# Patient Record
Sex: FEMALE | Race: OTHER | NOT HISPANIC OR LATINO | Employment: FULL TIME | URBAN - NONMETROPOLITAN AREA
[De-identification: names, ages, dates, MRNs, and addresses within clinical notes are randomized per-mention and may not be internally consistent; named-entity substitution may affect disease eponyms.]

---

## 2017-01-04 ENCOUNTER — TELEPHONE (OUTPATIENT)
Dept: MEDICAL GROUP | Facility: PHYSICIAN GROUP | Age: 32
End: 2017-01-04

## 2017-01-04 RX ORDER — ESCITALOPRAM OXALATE 10 MG/1
TABLET ORAL
Qty: 30 TAB | Refills: 3 | Status: SHIPPED | OUTPATIENT
Start: 2017-01-04 | End: 2017-07-26 | Stop reason: SDUPTHER

## 2017-01-04 NOTE — TELEPHONE ENCOUNTER
1. Caller Name:Catherine                                Call Back Number: [phone] (home)       Patient approves a detailed voicemail message: N\A    Received message asking for refill Lexapro. RX approved and received by pharmacyCatherine

## 2017-01-10 ENCOUNTER — OFFICE VISIT (OUTPATIENT)
Dept: URGENT CARE | Facility: PHYSICIAN GROUP | Age: 32
End: 2017-01-10
Payer: COMMERCIAL

## 2017-01-10 VITALS
BODY MASS INDEX: 38.59 KG/M2 | TEMPERATURE: 97.3 F | SYSTOLIC BLOOD PRESSURE: 122 MMHG | DIASTOLIC BLOOD PRESSURE: 84 MMHG | HEART RATE: 108 BPM | RESPIRATION RATE: 14 BRPM | WEIGHT: 239 LBS | OXYGEN SATURATION: 97 %

## 2017-01-10 DIAGNOSIS — R30.0 DYSURIA: ICD-10-CM

## 2017-01-10 LAB
APPEARANCE UR: NORMAL
BILIRUB UR STRIP-MCNC: NORMAL MG/DL
COLOR UR AUTO: NORMAL
GLUCOSE UR STRIP.AUTO-MCNC: NORMAL MG/DL
KETONES UR STRIP.AUTO-MCNC: NORMAL MG/DL
LEUKOCYTE ESTERASE UR QL STRIP.AUTO: NORMAL
NITRITE UR QL STRIP.AUTO: POSITIVE
PH UR STRIP.AUTO: 6.5 [PH] (ref 5–8)
PROT UR QL STRIP: 30 MG/DL
RBC UR QL AUTO: NORMAL
SP GR UR STRIP.AUTO: 1.01
UROBILINOGEN UR STRIP-MCNC: NORMAL MG/DL

## 2017-01-10 PROCEDURE — 99213 OFFICE O/P EST LOW 20 MIN: CPT | Performed by: FAMILY MEDICINE

## 2017-01-10 PROCEDURE — 81002 URINALYSIS NONAUTO W/O SCOPE: CPT | Performed by: FAMILY MEDICINE

## 2017-01-10 RX ORDER — PHENAZOPYRIDINE HYDROCHLORIDE 200 MG/1
200 TABLET, FILM COATED ORAL 3 TIMES DAILY PRN
Qty: 6 TAB | Refills: 0 | Status: SHIPPED | OUTPATIENT
Start: 2017-01-10 | End: 2018-08-21

## 2017-01-10 RX ORDER — SULFAMETHOXAZOLE AND TRIMETHOPRIM 800; 160 MG/1; MG/1
1 TABLET ORAL EVERY 12 HOURS
Qty: 10 TAB | Refills: 0 | Status: SHIPPED | OUTPATIENT
Start: 2017-01-10 | End: 2017-01-15

## 2017-01-10 ASSESSMENT — ENCOUNTER SYMPTOMS
DIZZINESS: 0
FOCAL WEAKNESS: 0
FEVER: 0
CHILLS: 0

## 2017-01-11 NOTE — PROGRESS NOTES
Subjective:      Catherine Porter is a 31 y.o. female who presents with UTI    Chief Complaint   Patient presents with   • UTI        - freq/burn x 1 day. No NVFC              UTI  Pertinent negatives include no chills or fever.       Review of Systems   Constitutional: Negative for fever and chills.   Genitourinary: Positive for dysuria and frequency.   Neurological: Negative for dizziness and focal weakness.          Objective:     /84 mmHg  Pulse 108  Temp(Src) 36.3 °C (97.3 °F)  Resp 14  Wt 108.41 kg (239 lb)  SpO2 97%     Physical Exam   Constitutional: She appears well-developed. No distress.   HENT:   Head: Normocephalic and atraumatic.   Cardiovascular: Regular rhythm.    No murmur heard.  Abdominal: Soft. There is no tenderness.   Neurological: She is alert.   Skin: Skin is warm and dry.   Psychiatric: She has a normal mood and affect. Judgment normal.   Nursing note and vitals reviewed.              Assessment/Plan:         1. Dysuria  POCT Urinalysis    sulfamethoxazole-trimethoprim (BACTRIM DS) 800-160 MG tablet    phenazopyridine (PYRIDIUM) 200 MG Tab             Dx & d/c instructions discussed w/ patient and/or family members. Follow up w/ Prvt Dr or here in 3-4 days if not getting better, sooner if needed,  ER if worse and UC/PCP unavailable.        Possible side effects (i.e. Rash, GI upset/constipation, sedation, elevation of BP or sugars) of any medications given discussed.

## 2017-03-16 ENCOUNTER — OFFICE VISIT (OUTPATIENT)
Dept: MEDICAL GROUP | Facility: PHYSICIAN GROUP | Age: 32
End: 2017-03-16
Payer: COMMERCIAL

## 2017-03-16 VITALS
RESPIRATION RATE: 16 BRPM | TEMPERATURE: 100.8 F | HEIGHT: 66 IN | OXYGEN SATURATION: 97 % | WEIGHT: 244 LBS | BODY MASS INDEX: 39.21 KG/M2 | HEART RATE: 88 BPM | DIASTOLIC BLOOD PRESSURE: 80 MMHG | SYSTOLIC BLOOD PRESSURE: 108 MMHG

## 2017-03-16 DIAGNOSIS — F41.9 ANXIETY: ICD-10-CM

## 2017-03-16 DIAGNOSIS — G47.30 SLEEP DISORDER BREATHING: ICD-10-CM

## 2017-03-16 PROCEDURE — 99213 OFFICE O/P EST LOW 20 MIN: CPT | Performed by: NURSE PRACTITIONER

## 2017-03-16 ASSESSMENT — PAIN SCALES - GENERAL: PAINLEVEL: NO PAIN

## 2017-03-16 NOTE — ASSESSMENT & PLAN NOTE
This is a 32 y/o obese female who is here to discuss sleep disordered breathing. She tells me that her  complains of her snoring nightly. She wakes in the morning not feeling rested. She does feel quite fatigued throughout the day. She feels that she could take a nap at any point during the day. Her  is describe apneic episodes. She has tried sleeping propped up on pillows which does not help her snoring. We discussed referral to sleep studies for possible sleep apnea evaluation. She is agreeable.

## 2017-03-16 NOTE — ASSESSMENT & PLAN NOTE
Ongoing, well-controlled on present medications. Patient continues to use Lexapro 10 mg daily. She does not need to use her Atarax for panic attack. She denies ruminating thoughts, worry. She feels quite happy. She continues to see a marriage counselor with her . She denies any adverse effects this medication. She has been trying to spend more time doing things for herself. She schedules time with friends monthly, she does crafts with her children during the week.

## 2017-03-16 NOTE — PROGRESS NOTES
[facility]  Primary Care Office Visit - Problem-Oriented        History:     Catherine Porter is a 31 y.o. female who is here today to discuss Follow-Up      Sleep disorder breathing  This is a 30 y/o obese female who is here to discuss sleep disordered breathing. She tells me that her  complains of her snoring nightly. She wakes in the morning not feeling rested. She does feel quite fatigued throughout the day. She feels that she could take a nap at any point during the day. Her  is describe apneic episodes. She has tried sleeping propped up on pillows which does not help her snoring. We discussed referral to sleep studies for possible sleep apnea evaluation. She is agreeable.    Anxiety  Ongoing, well-controlled on present medications. Patient continues to use Lexapro 10 mg daily. She does not need to use her Atarax for panic attack. She denies ruminating thoughts, worry. She feels quite happy. She continues to see a marriage counselor with her . She denies any adverse effects this medication. She has been trying to spend more time doing things for herself. She schedules time with friends monthly, she does crafts with her children during the week.          Past Medical History   Diagnosis Date   • Hyperlipidemia LDL goal < 130 7/30/2015     Past Surgical History   Procedure Laterality Date   • Gyn surgery  primary c section     2010   • Repeat c section  12/23/2013     Performed by Dony Bales M.D. at [facility]     Social History     Social History   • Marital Status:      Spouse Name: N/A   • Number of Children: N/A   • Years of Education: N/A     Occupational History   • Not on file.     Social History Main Topics   • Smoking status: Never Smoker    • Smokeless tobacco: Never Used   • Alcohol Use: 1.0 oz/week     2 Glasses of wine per week      Comment: once weekly, 1-2 glasses of wine   • Drug Use: No   • Sexual Activity: Not on file     Other Topics Concern   •  "Not on file     Social History Narrative     History   Smoking status   • Never Smoker    Smokeless tobacco   • Never Used     Family History   Problem Relation Age of Onset   • Hypertension Mother    • Arthritis Mother    • Diabetes Father    • Heart Disease Father    • Heart Attack Father    • Cancer Maternal Grandmother      breast     No Known Allergies    Problem List:     Patient Active Problem List    Diagnosis Date Noted   • Sleep disorder breathing 03/16/2017   • PATRICIA positive for HSV 09/16/2016   • Vitamin D deficiency 08/27/2015   • Tension headache 08/27/2015   • Nausea 08/27/2015   • Hyperlipidemia with target LDL less than 130 07/30/2015   • Anxiety 07/16/2015   • Family history of diabetes mellitus 06/30/2015   • Obesity 06/30/2015         Medications:     Current outpatient prescriptions:   •  escitalopram (LEXAPRO) 10 MG Tab, TAKE ONE TABLET BY MOUTH ONCE DAILY, Disp: 30 Tab, Rfl: 3  •  fluticasone (FLONASE) 50 MCG/ACT nasal spray, Spray 1 Spray in nose 2 times a day., Disp: 1 Bottle, Rfl: 0  •  phenazopyridine (PYRIDIUM) 200 MG Tab, Take 1 Tab by mouth 3 times a day as needed., Disp: 6 Tab, Rfl: 0  •  erythromycin 5 MG/GM Ointment, Place 1 cm in left eye 2 times a day., Disp: 1 Tube, Rfl: 0  •  acetaminophen/caffeine/butalbital 325-40-50 mg (FIORICET) -40 MG Tab, Take 1 Tab by mouth every four hours as needed for Headache., Disp: 30 Tab, Rfl: 0  •  hydrOXYzine (ATARAX) 25 MG TABS, Take 1 Tab by mouth 3 times a day as needed for Anxiety., Disp: 30 Tab, Rfl: 3      Review of Systems:     (positives in bold), all other systems reviewed and WNL   CONST:  fatigue, weight change, appetite change, fevers, chills  Psych: Suicidal ideation, homicidal ideation    Physical Assessment:     VS: /80 mmHg  Pulse 88  Temp(Src) 38.2 °C (100.8 °F)  Resp 16  Ht 1.676 m (5' 6\")  Wt 110.678 kg (244 lb)  BMI 39.40 kg/m2  SpO2 97%  LMP 03/01/2017  Breastfeeding? No    General: Well-developed, " well-nourished, female    Head: PERRL, EOMI. Normocephalic. No facial asymmetry noted.  Neck: Short, thick neck. Neck supple, no thyromegaly  Cardiovasc:No JVD.  RRR, no MRG. No thrills or bruits. Pulses 2+ and symmetric at all distal extremities.  Pulmonary: Lungs clear bilaterally.  Normal respiratory effort. No wheeze or crackles.   Extremities: No edema, no TTP bilateral calves. Pedal pulses intact. No joint effusions. LEs warm and well-perfused.  Neuro: Alert and oriented  Skin:No rashes noted. Skin warm, dry, intact    Psych: Dressed appropriately for the weather, pleasant and conversant.  Affect, mood & judgment appropriate.    Assessment/Plan:   Catherine was seen today for follow-up.    Diagnoses and all orders for this visit:    Anxiety, well controlled on present medication   - Continue Lexapro 10 mg daily. Continue to see counselor. Follow up yearly, sooner if needed.    Sleep disorder breathing, new, uncontrolled   -     REFERRAL TO SLEEP STUDIES    Patient is agreeable to the above plan and voiced understanding. All questions answered.     Please note that this dictation was created using voice recognition software. I have made every reasonable attempt to correct obvious errors, but I expect that there are errors of grammar and possibly content that I did not discover before finalizing the note.      RAMONA Denny  3/16/2017, 9:46 AM

## 2017-03-16 NOTE — MR AVS SNAPSHOT
"        Catherine Riveraper   3/16/2017 9:00 AM   Office Visit   MRN: [id number]    Department:  [facility]   Dept Phone:  [phone]    Description:  Female : 1985   Provider:  GAGE Cordero           Reason for Visit     Follow-Up anxiety and medications      Allergies as of 3/16/2017     No Known Allergies      You were diagnosed with     Anxiety   [825613]       Sleep disorder breathing   [928513]         Vital Signs     Blood Pressure Pulse Temperature Respirations Height Weight    108/80 mmHg 88 38.2 °C (100.8 °F) 16 1.676 m (5' 6\") 110.678 kg (244 lb)    Body Mass Index Oxygen Saturation Last Menstrual Period Breastfeeding? Smoking Status       39.40 kg/m2 97% 2017 No Never Smoker        Basic Information     Date Of Birth Sex Race Ethnicity Preferred Language    1985 Female Other Non- English      Your appointments     Mar 22, 2018  9:00 AM   Established Patient with GAGE Cordero   [address] [address] [phone]           You will be receiving a confirmation call a few days before your appointment from our automated call confirmation system.              Problem List              ICD-10-CM Priority Class Noted - Resolved    Family history of diabetes mellitus Z83.3   2015 - Present    Obesity E66.9   2015 - Present    Anxiety F41.9   2015 - Present    Hyperlipidemia with target LDL less than 130 E78.5   2015 - Present    Vitamin D deficiency E55.9   2015 - Present    Tension headache G44.209   2015 - Present    Nausea R11.0   2015 - Present    PATRICIA positive for HSV Z11.59, B00.9   2016 - Present    Sleep disorder breathing G47.30   3/16/2017 - Present      Health Maintenance        Date Due Completion Dates    IMM DTaP/Tdap/Td Vaccine (1 - Tdap) 2002 ---    PAP SMEAR 2004 ---    IMM INFLUENZA (1) 2016 ---            Current " Immunizations     MMR Vaccine 12/24/2013  6:30 AM    MMR/Varicella Combined Vaccine  Incomplete    Tdap Vaccine  Incomplete, 11/26/2013      Below and/or attached are the medications your provider expects you to take. Review all of your home medications and newly ordered medications with your provider and/or pharmacist. Follow medication instructions as directed by your provider and/or pharmacist. Please keep your medication list with you and share with your provider. Update the information when medications are discontinued, doses are changed, or new medications (including over-the-counter products) are added; and carry medication information at all times in the event of emergency situations     Allergies:  No Known Allergies          Medications  Valid as of: March 16, 2017 -  9:45 AM    Generic Name Brand Name Tablet Size Instructions for use    Butalbital-APAP-Caffeine (Tab) FIORICET -40 MG Take 1 Tab by mouth every four hours as needed for Headache.        Erythromycin (Ointment) erythromycin 5 MG/GM Place 1 cm in left eye 2 times a day.        Escitalopram Oxalate (Tab) LEXAPRO 10 MG TAKE ONE TABLET BY MOUTH ONCE DAILY        Fluticasone Propionate (Suspension) FLONASE 50 MCG/ACT Spray 1 Spray in nose 2 times a day.        HydrOXYzine HCl (Tab) ATARAX 25 MG Take 1 Tab by mouth 3 times a day as needed for Anxiety.        Phenazopyridine HCl (Tab) PYRIDIUM 200 MG Take 1 Tab by mouth 3 times a day as needed.        .                 Medicines prescribed today were sent to:     [facility] [address] [place] [address] - [facility] [address]    Phone: [phone] Fax: [phone]    Open 24 Hours?: No      Medication refill instructions:       If your prescription bottle indicates you have medication refills left, it is not necessary to call your provider’s office. Please contact your pharmacy and they will refill your medication.    If your prescription bottle  indicates you do not have any refills left, you may request refills at any time through one of the following ways: The online [employer] system (except Urgent Care), by calling your provider’s office, or by asking your pharmacy to contact your provider’s office with a refill request. Medication refills are processed only during regular business hours and may not be available until the next business day. Your provider may request additional information or to have a follow-up visit with you prior to refilling your medication.   *Please Note: Medication refills are assigned a new Rx number when refilled electronically. Your pharmacy may indicate that no refills were authorized even though a new prescription for the same medication is available at the pharmacy. Please request the medicine by name with the pharmacy before contacting your provider for a refill.        Referral     A referral request has been sent to our patient care coordination department. Please allow 3-5 business days for us to process this request and contact you either by phone or mail. If you do not hear from us by the 5th business day, please call us at [phone].           [employer] Access Code: [email or web address]  Expires: 3/30/2017  1:07 PM    [employer]  A secure, online tool to manage your health information     [employer]’s [employer]® is a secure, online tool that connects you to your personalized health information from the privacy of your home -- day or night - making it very easy for you to manage your healthcare. Once the activation process is completed, you can even access your medical information using the [employer] lenin, which is available for free in the [employer] store or Google Play store.     [employer] provides the following levels of access (as shown below):   My Chart Features   [facility] Doctor [facility]  Specialists [facility]  Urgent  Care Non-[facility]  Primary Care  Doctor   Email your healthcare team securely and privately 24/7 X X  X    Manage appointments: schedule your next appointment; view details of past/upcoming appointments X      Request prescription refills. X      View recent personal medical records, including lab and immunizations X X X X   View health record, including health history, allergies, medications X X X X   Read reports about your outpatient visits, procedures, consult and ER notes X X X X   See your discharge summary, which is a recap of your hospital and/or ER visit that includes your diagnosis, lab results, and care plan. X X       How to register for [employer] (PRIMP):  1. Go to  [email or web address].  2. Click on the Sign Up Now box, which takes you to the New Member Sign Up page. You will need to provide the following information:  a. Enter your [employer] ([employer]) Access Code exactly as it appears at the top of this page. (You will not need to use this code after you’ve completed the sign-up process. If you do not sign up before the expiration date, you must request a new code.)   b. Enter your date of birth.   c. Enter your home email address.   d. Click Submit, and follow the next screen’s instructions.  3. Create a [employer] ([employer]) ID. This will be your [employer]) login ID and cannot be changed, so think of one that is secure and easy to remember.  4. Create a [employer] ([employer]) password. You can change your password at any time.  5. Enter your Password Reset Question and Answer. This can be used at a later time if you forget your password.   6. Enter your e-mail address. This allows you to receive e-mail notifications when new information is available in [employer] (PRIMP).  7. Click Sign Up. You can now view your health information.    For assistance activating your [employer]) account, call [phone]

## 2017-07-26 RX ORDER — ESCITALOPRAM OXALATE 10 MG/1
TABLET ORAL
Qty: 90 TAB | Refills: 1 | Status: SHIPPED | OUTPATIENT
Start: 2017-07-26 | End: 2018-08-21

## 2017-08-28 ENCOUNTER — OFFICE VISIT (OUTPATIENT)
Dept: URGENT CARE | Facility: PHYSICIAN GROUP | Age: 32
End: 2017-08-28
Payer: COMMERCIAL

## 2017-08-28 VITALS
BODY MASS INDEX: 35.36 KG/M2 | HEART RATE: 80 BPM | SYSTOLIC BLOOD PRESSURE: 104 MMHG | RESPIRATION RATE: 16 BRPM | DIASTOLIC BLOOD PRESSURE: 68 MMHG | HEIGHT: 66 IN | TEMPERATURE: 98.4 F | OXYGEN SATURATION: 96 % | WEIGHT: 220 LBS

## 2017-08-28 DIAGNOSIS — H60.501 ACUTE OTITIS EXTERNA OF RIGHT EAR, UNSPECIFIED TYPE: ICD-10-CM

## 2017-08-28 PROCEDURE — 99214 OFFICE O/P EST MOD 30 MIN: CPT | Performed by: FAMILY MEDICINE

## 2017-08-28 RX ORDER — SULFAMETHOXAZOLE AND TRIMETHOPRIM 800; 160 MG/1; MG/1
1 TABLET ORAL 2 TIMES DAILY
Qty: 20 TAB | Refills: 0 | Status: SHIPPED | OUTPATIENT
Start: 2017-08-28 | End: 2017-09-07

## 2017-08-28 RX ORDER — IBUPROFEN 800 MG/1
800 TABLET ORAL EVERY 8 HOURS PRN
Qty: 30 TAB | Refills: 0 | Status: SHIPPED | OUTPATIENT
Start: 2017-08-28 | End: 2018-04-18

## 2017-08-28 RX ORDER — ESCITALOPRAM OXALATE 10 MG/1
10 TABLET ORAL DAILY
COMMUNITY
End: 2018-08-21 | Stop reason: SDUPTHER

## 2017-08-28 RX ORDER — CIPROFLOXACIN AND DEXAMETHASONE 3; 1 MG/ML; MG/ML
4 SUSPENSION/ DROPS AURICULAR (OTIC) 2 TIMES DAILY
Qty: 1 BOTTLE | Refills: 0 | Status: SHIPPED | OUTPATIENT
Start: 2017-08-28 | End: 2018-04-18

## 2017-08-31 ASSESSMENT — ENCOUNTER SYMPTOMS
HEADACHES: 0
SORE THROAT: 0

## 2017-09-01 NOTE — PROGRESS NOTES
"Subjective:   Catherine Carvajal a 31 y.o. female who presents for Otalgia (2 days )    Otalgia    There is pain in the right ear. This is a new problem. The current episode started yesterday. The problem occurs constantly. The problem has been gradually worsening. There has been no fever. Pertinent negatives include no ear discharge, headaches, hearing loss or sore throat.     Review of Systems   HENT: Positive for ear pain. Negative for ear discharge, hearing loss and sore throat.    Neurological: Negative for headaches.     No Known Allergies   Objective:   /68   Pulse 80   Temp 36.9 °C (98.4 °F)   Resp 16   Ht 1.676 m (5' 6\")   Wt 99.8 kg (220 lb)   SpO2 96%   BMI 35.51 kg/m²   Physical Exam   Constitutional: She is oriented to person, place, and time. She appears well-developed and well-nourished. No distress.   HENT:   Head: Normocephalic and atraumatic.   Right Ear: There is swelling and tenderness. No drainage. No middle ear effusion. No decreased hearing is noted.   Eyes: Conjunctivae and EOM are normal. Pupils are equal, round, and reactive to light.   Cardiovascular: Normal rate and regular rhythm.    No murmur heard.  Pulmonary/Chest: Effort normal and breath sounds normal. No respiratory distress.   Abdominal: Soft. She exhibits no distension. There is no tenderness.   Neurological: She is alert and oriented to person, place, and time. She has normal reflexes. No sensory deficit.   Skin: Skin is warm and dry.   Psychiatric: She has a normal mood and affect.     Assessment/Plan:   Assessment    1. Acute otitis externa of right ear, unspecified type  Differential diagnosis, natural history, supportive care, and indications for immediate follow-up discussed.         "

## 2017-10-08 ENCOUNTER — OFFICE VISIT (OUTPATIENT)
Dept: URGENT CARE | Facility: CLINIC | Age: 32
End: 2017-10-08
Payer: COMMERCIAL

## 2017-10-08 VITALS
TEMPERATURE: 97.5 F | WEIGHT: 235 LBS | RESPIRATION RATE: 14 BRPM | OXYGEN SATURATION: 95 % | SYSTOLIC BLOOD PRESSURE: 108 MMHG | BODY MASS INDEX: 37.77 KG/M2 | DIASTOLIC BLOOD PRESSURE: 64 MMHG | HEART RATE: 88 BPM | HEIGHT: 66 IN

## 2017-10-08 DIAGNOSIS — J02.9 SORE THROAT: ICD-10-CM

## 2017-10-08 DIAGNOSIS — J02.9 VIRAL PHARYNGITIS: ICD-10-CM

## 2017-10-08 LAB
INT CON NEG: NEGATIVE
INT CON POS: POSITIVE
S PYO AG THROAT QL: NEGATIVE

## 2017-10-08 PROCEDURE — 99214 OFFICE O/P EST MOD 30 MIN: CPT | Performed by: NURSE PRACTITIONER

## 2017-10-08 PROCEDURE — 87880 STREP A ASSAY W/OPTIC: CPT | Performed by: NURSE PRACTITIONER

## 2017-10-08 RX ORDER — AZITHROMYCIN 250 MG/1
TABLET, FILM COATED ORAL
Qty: 6 TAB | Refills: 0 | Status: SHIPPED | OUTPATIENT
Start: 2017-10-08 | End: 2018-08-21

## 2017-10-08 ASSESSMENT — ENCOUNTER SYMPTOMS
FEVER: 0
HOARSE VOICE: 1
TROUBLE SWALLOWING: 1
SORE THROAT: 1
SWOLLEN GLANDS: 1

## 2017-10-08 NOTE — PROGRESS NOTES
"Subjective:      Catherine Porter is a 31 y.o. female who presents with Pharyngitis (x 1 week and getting worse x 2 days)            Pharyngitis    This is a new problem. Episode onset: pt reports sore throat began one week ago. Over the last 2 days it has gotten worse. She has tried hot tea with honey and lemon, Tyelnol and Ibuprofen w/o relief. The problem has been gradually worsening. Neither side of throat is experiencing more pain than the other. There has been no fever. The pain is moderate. Associated symptoms include a hoarse voice, swollen glands and trouble swallowing. The treatment provided no relief.       Review of Systems   Constitutional: Positive for malaise/fatigue. Negative for fever.   HENT: Positive for hoarse voice, sore throat and trouble swallowing.    All other systems reviewed and are negative.    Past Medical History:   Diagnosis Date   • Hyperlipidemia LDL goal < 130 7/30/2015      Past Surgical History:   Procedure Laterality Date   • REPEAT C SECTION  12/23/2013    Performed by Dony Bales M.D. at [facility]   • GYN SURGERY  primary c section    2010      Social History     Social History   • Marital status:      Spouse name: N/A   • Number of children: N/A   • Years of education: N/A     Occupational History   • Not on file.     Social History Main Topics   • Smoking status: Never Smoker   • Smokeless tobacco: Never Used   • Alcohol use 1.0 oz/week     2 Glasses of wine per week      Comment: once weekly, 1-2 glasses of wine   • Drug use: No   • Sexual activity: Not on file     Other Topics Concern   • Not on file     Social History Narrative   • No narrative on file          Objective:     /64   Pulse 88   Temp 36.4 °C (97.5 °F)   Resp 14   Ht 1.676 m (5' 6\")   Wt 106.6 kg (235 lb)   SpO2 95%   BMI 37.93 kg/m²      Physical Exam   Constitutional: She is oriented to person, place, and time. Vital signs are normal. She appears well-developed and " well-nourished.   HENT:   Head: Normocephalic and atraumatic.   Right Ear: Tympanic membrane and external ear normal.   Left Ear: Tympanic membrane and external ear normal.   Nose: Nose normal.   Mouth/Throat: Posterior oropharyngeal edema and posterior oropharyngeal erythema present. Tonsils are 2+ on the right. Tonsils are 2+ on the left. No tonsillar exudate.   Eyes: EOM are normal. Pupils are equal, round, and reactive to light.   Neck: Normal range of motion.   Cardiovascular: Normal rate and regular rhythm.    Pulmonary/Chest: Effort normal.   Musculoskeletal: Normal range of motion.   Lymphadenopathy:        Head (right side): Submandibular and tonsillar adenopathy present.        Head (left side): Submandibular and tonsillar adenopathy present.   Neurological: She is alert and oriented to person, place, and time.   Skin: Skin is warm and dry. Capillary refill takes less than 2 seconds.   Psychiatric: She has a normal mood and affect. Her speech is normal and behavior is normal. Thought content normal.   Vitals reviewed.              Assessment/Plan:     1. Sore throat  - POCT Rapid Strep A NEGATIVE    2. Viral pharyngitis  - azithromycin (ZITHROMAX) 250 MG Tab; Take 2 tabs PO on the first day, then one tab PO daily for 4 days  Dispense: 6 Tab; Refill: 0    Contingent antibiotic prescription given to patient to fill upon meeting criteria of guidelines discussed.   Warm salt water gargles  Supportive care, differential diagnoses, and indications for immediate follow-up discussed with patient.    Pathogenesis of diagnosis discussed including typical length and natural progression.      Instructed to return to  or nearest emergency department if symptoms fail to improve, for any change in condition, further concerns, or new concerning symptoms.  Patient states understanding of the plan of care and discharge instructions.

## 2018-04-18 ENCOUNTER — OFFICE VISIT (OUTPATIENT)
Dept: URGENT CARE | Facility: CLINIC | Age: 33
End: 2018-04-18
Payer: COMMERCIAL

## 2018-04-18 VITALS
DIASTOLIC BLOOD PRESSURE: 80 MMHG | BODY MASS INDEX: 33.74 KG/M2 | HEART RATE: 82 BPM | SYSTOLIC BLOOD PRESSURE: 118 MMHG | OXYGEN SATURATION: 97 % | HEIGHT: 67 IN | WEIGHT: 215 LBS | TEMPERATURE: 97.6 F | RESPIRATION RATE: 16 BRPM

## 2018-04-18 DIAGNOSIS — H60.332 ACUTE SWIMMER'S EAR OF LEFT SIDE: ICD-10-CM

## 2018-04-18 PROCEDURE — 99214 OFFICE O/P EST MOD 30 MIN: CPT | Performed by: FAMILY MEDICINE

## 2018-04-18 RX ORDER — CEPHALEXIN 500 MG/1
500 CAPSULE ORAL 3 TIMES DAILY
Qty: 21 CAP | Refills: 0 | Status: SHIPPED | OUTPATIENT
Start: 2018-04-18 | End: 2018-04-25

## 2018-04-18 RX ORDER — NEOMYCIN POLYMYXIN B SULFATES AND DEXAMETHASONE 3.5; 10000; 1 MG/ML; [USP'U]/ML; MG/ML
SUSPENSION/ DROPS OPHTHALMIC
Qty: 1 BOTTLE | Refills: 1 | Status: SHIPPED | OUTPATIENT
Start: 2018-04-18 | End: 2018-08-21

## 2018-04-18 RX ORDER — IBUPROFEN 800 MG/1
800 TABLET ORAL EVERY 8 HOURS PRN
Qty: 20 TAB | Refills: 3 | Status: SHIPPED | OUTPATIENT
Start: 2018-04-18 | End: 2018-08-21

## 2018-04-18 ASSESSMENT — ENCOUNTER SYMPTOMS
DIZZINESS: 0
ORTHOPNEA: 0
FEVER: 0
SHORTNESS OF BREATH: 0
HEMOPTYSIS: 0
CHILLS: 0
FOCAL WEAKNESS: 0

## 2018-04-18 NOTE — PROGRESS NOTES
Subjective:      Catherine Porter is a 32 y.o. female who presents with Otalgia ((L) ear x 1 week)    Chief Complaint   Patient presents with   • Otalgia     (L) ear x 1 week        - This is a very pleasant 32 y.o. female with complaints of Lt ear pain x 1wk, no trauma          ALLERGIES:  Patient has no known allergies.     PMH:  Past Medical History:   Diagnosis Date   • Hyperlipidemia LDL goal < 130 7/30/2015        MEDS:    Current Outpatient Prescriptions:   •  neomycin-polymyxin-dexamethasone (MAXITROL) 0.1 % ophthalmic suspension, 5 drops Lt ear TID x 1 week, Disp: 1 Bottle, Rfl: 1  •  ibuprofen (MOTRIN) 800 MG Tab, Take 1 Tab by mouth every 8 hours as needed., Disp: 20 Tab, Rfl: 3  •  cephALEXin (KEFLEX) 500 MG Cap, Take 1 Cap by mouth 3 times a day for 7 days., Disp: 21 Cap, Rfl: 0  •  escitalopram (LEXAPRO) 10 MG Tab, TAKE ONE TABLET BY MOUTH ONCE DAILY, Disp: 90 Tab, Rfl: 1  •  azithromycin (ZITHROMAX) 250 MG Tab, Take 2 tabs PO on the first day, then one tab PO daily for 4 days, Disp: 6 Tab, Rfl: 0  •  escitalopram (LEXAPRO) 10 MG Tab, Take 10 mg by mouth every day., Disp: , Rfl:   •  phenazopyridine (PYRIDIUM) 200 MG Tab, Take 1 Tab by mouth 3 times a day as needed., Disp: 6 Tab, Rfl: 0  •  erythromycin 5 MG/GM Ointment, Place 1 cm in left eye 2 times a day., Disp: 1 Tube, Rfl: 0  •  fluticasone (FLONASE) 50 MCG/ACT nasal spray, Spray 1 Spray in nose 2 times a day., Disp: 1 Bottle, Rfl: 0  •  acetaminophen/caffeine/butalbital 325-40-50 mg (FIORICET) -40 MG Tab, Take 1 Tab by mouth every four hours as needed for Headache., Disp: 30 Tab, Rfl: 0  •  hydrOXYzine (ATARAX) 25 MG TABS, Take 1 Tab by mouth 3 times a day as needed for Anxiety., Disp: 30 Tab, Rfl: 3    ** I have documented what I find to be significant in regards to past medical, social, family and surgical history  in my HPI or under PMH/PSH/FH review section, otherwise it is contributory **           HPI    Review of Systems  "  Constitutional: Negative for chills and fever.   HENT: Positive for ear pain.    Respiratory: Negative for hemoptysis and shortness of breath.    Cardiovascular: Negative for chest pain and orthopnea.   Neurological: Negative for dizziness and focal weakness.          Objective:     /80   Pulse 82   Temp 36.4 °C (97.6 °F)   Resp 16   Ht 1.702 m (5' 7\")   Wt 97.5 kg (215 lb)   SpO2 97%   BMI 33.67 kg/m²      Physical Exam   Constitutional: She appears well-developed. No distress.   HENT:   Head: Normocephalic and atraumatic.   Mouth/Throat: Oropharynx is clear and moist.   Eyes: Conjunctivae are normal.   Neck: Neck supple.   Cardiovascular: Regular rhythm.    No murmur heard.  Pulmonary/Chest: Effort normal. No respiratory distress.   Neurological: She is alert. She exhibits normal muscle tone.   Skin: Skin is warm and dry.   Psychiatric: She has a normal mood and affect. Judgment normal.   Nursing note and vitals reviewed.  Lt ear: EAC red tender, TM red/bulging            Assessment/Plan:         1. Acute swimmer's ear of left side  neomycin-polymyxin-dexamethasone (MAXITROL) 0.1 % ophthalmic suspension    ibuprofen (MOTRIN) 800 MG Tab    cephALEXin (KEFLEX) 500 MG Cap             Dx & d/c instructions discussed w/ patient and/or family members. Follow up w/ Prvt Dr or here in 3-4 days if not getting better, sooner if needed,  ER if worse and UC/PCP unavailable.        Possible side effects (i.e. Rash, GI upset/constipation, sedation, elevation of BP or sugars) of any medications given discussed.                "

## 2018-08-06 ENCOUNTER — OFFICE VISIT (OUTPATIENT)
Dept: URGENT CARE | Facility: PHYSICIAN GROUP | Age: 33
End: 2018-08-06
Payer: COMMERCIAL

## 2018-08-06 VITALS
SYSTOLIC BLOOD PRESSURE: 132 MMHG | DIASTOLIC BLOOD PRESSURE: 74 MMHG | RESPIRATION RATE: 15 BRPM | BODY MASS INDEX: 37.28 KG/M2 | TEMPERATURE: 98.5 F | HEART RATE: 96 BPM | OXYGEN SATURATION: 94 % | WEIGHT: 238 LBS

## 2018-08-06 DIAGNOSIS — R09.81 NASAL CONGESTION WITH RHINORRHEA: ICD-10-CM

## 2018-08-06 DIAGNOSIS — J34.89 NASAL CONGESTION WITH RHINORRHEA: ICD-10-CM

## 2018-08-06 DIAGNOSIS — J02.9 PHARYNGITIS, UNSPECIFIED ETIOLOGY: ICD-10-CM

## 2018-08-06 DIAGNOSIS — J02.9 SORE THROAT: ICD-10-CM

## 2018-08-06 DIAGNOSIS — R09.82 PND (POST-NASAL DRIP): ICD-10-CM

## 2018-08-06 LAB
INT CON NEG: NEGATIVE
INT CON POS: POSITIVE
S PYO AG THROAT QL: NEGATIVE

## 2018-08-06 PROCEDURE — 87880 STREP A ASSAY W/OPTIC: CPT | Performed by: NURSE PRACTITIONER

## 2018-08-06 PROCEDURE — 99214 OFFICE O/P EST MOD 30 MIN: CPT | Performed by: NURSE PRACTITIONER

## 2018-08-06 RX ORDER — FLUTICASONE PROPIONATE 50 MCG
2 SPRAY, SUSPENSION (ML) NASAL DAILY
Qty: 1 BOTTLE | Refills: 0 | Status: SHIPPED | OUTPATIENT
Start: 2018-08-06 | End: 2018-08-21

## 2018-08-06 RX ORDER — CETIRIZINE HYDROCHLORIDE 10 MG/1
10 TABLET ORAL DAILY
Qty: 30 TAB | Refills: 0 | Status: SHIPPED | OUTPATIENT
Start: 2018-08-06 | End: 2018-08-21

## 2018-08-06 RX ORDER — IBUPROFEN 600 MG/1
600 TABLET ORAL EVERY 6 HOURS PRN
Qty: 30 TAB | Refills: 0 | Status: SHIPPED | OUTPATIENT
Start: 2018-08-06 | End: 2018-08-21

## 2018-08-06 RX ORDER — AZITHROMYCIN 250 MG/1
TABLET, FILM COATED ORAL
Qty: 6 TAB | Refills: 0 | Status: SHIPPED | OUTPATIENT
Start: 2018-08-06 | End: 2018-08-21

## 2018-08-06 ASSESSMENT — ENCOUNTER SYMPTOMS
BACK PAIN: 0
EYE REDNESS: 0
MYALGIAS: 0
SHORTNESS OF BREATH: 0
FEVER: 0
EYE DISCHARGE: 0
NAUSEA: 0
ABDOMINAL PAIN: 0
DIZZINESS: 0
SINUS PAIN: 0
SORE THROAT: 1
NECK PAIN: 0
VOMITING: 0
WEAKNESS: 0
COUGH: 0
HEADACHES: 0
WHEEZING: 0
CONSTIPATION: 0
CHILLS: 0
DIARRHEA: 0

## 2018-08-06 NOTE — PROGRESS NOTES
Subjective:      Catherine Porter is a 32 y.o. female who presents with Pharyngitis (x 1 week )            HPI  Ginger is here for sore throat x 1 week. States  has had similar symptoms, and daughter was being treated for strep. C/o nasal congestion, runny nose, PND. Taking no OTC meds for symptoms.    PMH:  has a past medical history of Hyperlipidemia LDL goal < 130 (7/30/2015). She also has no past medical history of Arrhythmia; Asthma; Clotting disorder (HCC); Diabetes (HCC); Heart attack (HCC); Heart murmur; Hypertension; Seizure (HCC); or Stroke (HCC).  MEDS:   Current Outpatient Prescriptions:   •  fluticasone (FLONASE) 50 MCG/ACT nasal spray, Spray 2 Sprays in nose every day., Disp: 1 Bottle, Rfl: 0  •  cetirizine (ZYRTEC) 10 MG Tab, Take 1 Tab by mouth every day. May use daily x 7 days, Disp: 30 Tab, Rfl: 0  •  azithromycin (ZITHROMAX) 250 MG Tab, Take 2 tabs by mouth on day 1, then take 1 tab on days 2-5, Disp: 6 Tab, Rfl: 0  •  ibuprofen (MOTRIN) 600 MG Tab, Take 1 Tab by mouth every 6 hours as needed., Disp: 30 Tab, Rfl: 0  •  ibuprofen (MOTRIN) 800 MG Tab, Take 1 Tab by mouth every 8 hours as needed., Disp: 20 Tab, Rfl: 3  •  escitalopram (LEXAPRO) 10 MG Tab, TAKE ONE TABLET BY MOUTH ONCE DAILY, Disp: 90 Tab, Rfl: 1  •  fluticasone (FLONASE) 50 MCG/ACT nasal spray, Spray 1 Spray in nose 2 times a day., Disp: 1 Bottle, Rfl: 0  •  acetaminophen/caffeine/butalbital 325-40-50 mg (FIORICET) -40 MG Tab, Take 1 Tab by mouth every four hours as needed for Headache., Disp: 30 Tab, Rfl: 0  •  hydrOXYzine (ATARAX) 25 MG TABS, Take 1 Tab by mouth 3 times a day as needed for Anxiety., Disp: 30 Tab, Rfl: 3  •  neomycin-polymyxin-dexamethasone (MAXITROL) 0.1 % ophthalmic suspension, 5 drops Lt ear TID x 1 week, Disp: 1 Bottle, Rfl: 1  •  azithromycin (ZITHROMAX) 250 MG Tab, Take 2 tabs PO on the first day, then one tab PO daily for 4 days, Disp: 6 Tab, Rfl: 0  •  escitalopram (LEXAPRO) 10 MG Tab, Take 10 mg  by mouth every day., Disp: , Rfl:   •  phenazopyridine (PYRIDIUM) 200 MG Tab, Take 1 Tab by mouth 3 times a day as needed., Disp: 6 Tab, Rfl: 0  •  erythromycin 5 MG/GM Ointment, Place 1 cm in left eye 2 times a day., Disp: 1 Tube, Rfl: 0  ALLERGIES: No Known Allergies  SURGHX:   Past Surgical History:   Procedure Laterality Date   • REPEAT C SECTION  12/23/2013    Performed by Dony Bales M.D. [facility]   • GYN SURGERY  primary c section    2010     SOCHX:  reports that she has never smoked. She has never used smokeless tobacco. She reports that she drinks about 1.0 oz of alcohol per week . She reports that she does not use drugs.  FH: Family history was reviewed, no pertinent findings to report    Review of Systems   Constitutional: Negative for chills, fever and malaise/fatigue.   HENT: Positive for congestion, ear pain and sore throat. Negative for sinus pain.    Eyes: Negative for discharge and redness.   Respiratory: Negative for cough, shortness of breath and wheezing.    Gastrointestinal: Negative for abdominal pain, constipation, diarrhea, nausea and vomiting.   Musculoskeletal: Negative for back pain, myalgias and neck pain.   Skin: Negative for itching and rash.   Neurological: Negative for dizziness, weakness and headaches.   Endo/Heme/Allergies: Negative for environmental allergies.   All other systems reviewed and are negative.         Objective:     /74   Pulse 96   Temp 36.9 °C (98.5 °F)   Resp 15   Wt 108 kg (238 lb)   SpO2 94%   BMI 37.28 kg/m²      Physical Exam   Constitutional: She is oriented to person, place, and time. She appears well-developed and well-nourished. She is active and cooperative.  Non-toxic appearance. She does not have a sickly appearance. She appears ill. No distress.   HENT:   Head: Normocephalic.   Right Ear: External ear and ear canal normal. Tympanic membrane is injected.   Left Ear: External ear and ear canal normal. Tympanic membrane is  injected.   Nose: Mucosal edema and rhinorrhea present. No sinus tenderness.   Mouth/Throat: Uvula is midline. Mucous membranes are dry. No uvula swelling. Posterior oropharyngeal erythema present. No posterior oropharyngeal edema. Tonsils are 2+ on the right. Tonsils are 2+ on the left. No tonsillar exudate.   Eyes: Pupils are equal, round, and reactive to light. Conjunctivae and EOM are normal.   Neck: Normal range of motion. Neck supple.   Cardiovascular: Normal rate and regular rhythm.    Pulmonary/Chest: Effort normal and breath sounds normal. No accessory muscle usage. No respiratory distress. She has no decreased breath sounds. She has no wheezes. She has no rhonchi. She has no rales.   Musculoskeletal: Normal range of motion.   Lymphadenopathy:     She has no cervical adenopathy.   Neurological: She is alert and oriented to person, place, and time.   Skin: Skin is warm and dry. She is not diaphoretic.   Vitals reviewed.              Assessment/Plan:     1. Sore throat    - ibuprofen (MOTRIN) 600 MG Tab; Take 1 Tab by mouth every 6 hours as needed.  Dispense: 30 Tab; Refill: 0  - POCT Rapid Strep A    2. Nasal congestion with rhinorrhea    - fluticasone (FLONASE) 50 MCG/ACT nasal spray; Spray 2 Sprays in nose every day.  Dispense: 1 Bottle; Refill: 0  - cetirizine (ZYRTEC) 10 MG Tab; Take 1 Tab by mouth every day. May use daily x 7 days  Dispense: 30 Tab; Refill: 0    3. PND (post-nasal drip)    - fluticasone (FLONASE) 50 MCG/ACT nasal spray; Spray 2 Sprays in nose every day.  Dispense: 1 Bottle; Refill: 0  - cetirizine (ZYRTEC) 10 MG Tab; Take 1 Tab by mouth every day. May use daily x 7 days  Dispense: 30 Tab; Refill: 0    4. Pharyngitis, unspecified etiology    - azithromycin (ZITHROMAX) 250 MG Tab; Take 2 tabs by mouth on day 1, then take 1 tab on days 2-5  Dispense: 6 Tab; Refill: 0  - ibuprofen (MOTRIN) 600 MG Tab; Take 1 Tab by mouth every 6 hours as needed.  Dispense: 30 Tab; Refill: 0      Increase  water intake  May use Ibuprofen/Tylenol prn for any fever, body aches or throat pain  May take long acting antihistamine for seasonal allergy symptoms prn  May use saline nasal spray/domenic pot for nasal congestion prn  May use Flonase prn for nasal congestion  May use throat lozenges for throat discomfort  May gargle with salt water prn for throat discomfort  May drink smoothies for nutrition if too painful to swallow solid foods  Monitor for continued fever with treatment, any sinus pain/pressure with sinus congestion with thick mucus production and HA- need re-evaluation  Monitor for productive cough, SOB and chest pain/tightness, fever- need re-evaluation

## 2018-08-21 ENCOUNTER — OFFICE VISIT (OUTPATIENT)
Dept: INTERNAL MEDICINE | Facility: MEDICAL CENTER | Age: 33
End: 2018-08-21
Payer: COMMERCIAL

## 2018-08-21 VITALS
TEMPERATURE: 98.4 F | HEIGHT: 67 IN | WEIGHT: 238 LBS | DIASTOLIC BLOOD PRESSURE: 68 MMHG | SYSTOLIC BLOOD PRESSURE: 106 MMHG | BODY MASS INDEX: 37.35 KG/M2 | HEART RATE: 92 BPM | OXYGEN SATURATION: 96 %

## 2018-08-21 DIAGNOSIS — E66.9 OBESITY (BMI 30-39.9): ICD-10-CM

## 2018-08-21 DIAGNOSIS — Z00.00 HEALTH MAINTENANCE EXAMINATION: ICD-10-CM

## 2018-08-21 DIAGNOSIS — F41.9 ANXIETY: ICD-10-CM

## 2018-08-21 PROCEDURE — 99204 OFFICE O/P NEW MOD 45 MIN: CPT | Mod: GC | Performed by: INTERNAL MEDICINE

## 2018-08-21 RX ORDER — ESCITALOPRAM OXALATE 10 MG/1
10 TABLET ORAL DAILY
Qty: 90 TAB | Refills: 1 | Status: SHIPPED | OUTPATIENT
Start: 2018-08-21 | End: 2018-11-06 | Stop reason: SDUPTHER

## 2018-08-21 ASSESSMENT — PATIENT HEALTH QUESTIONNAIRE - PHQ9: CLINICAL INTERPRETATION OF PHQ2 SCORE: 0

## 2018-08-21 ASSESSMENT — PAIN SCALES - GENERAL: PAINLEVEL: NO PAIN

## 2018-08-21 NOTE — PROGRESS NOTES
New Patient to Establish    Reason to establish: New patient to establish    CC: New patient establishment.    HPI: Catherine Porter is a 32 y.o. female with medical history of anxiety moved to [place] one year ago presented to clinic to establish care as a new patient.     Anxiety: Diagnosed in 2015, patient had difficult situations while she was passing through a divorce, her PCP tried multiple medications and the patient responded well to Lexapro 10 mg daily with when necessary Atarax, she is not very compliant with her medications, misses 2-3 doses per week. Current KHOA score 14.    Of note patient state she had abnormal vaginal growth that was excised in 2015 and she was instructed to follow-up but she did not, last Pap smear was in 2014 and was normal. Medical records [facility].      Patient Active Problem List    Diagnosis Date Noted   • Obesity (BMI 30-39.9) 08/21/2018   • Health maintenance examination 08/21/2018   • Sleep disorder breathing 03/16/2017   • PATRICIA positive for HSV 09/16/2016   • Vitamin D deficiency 08/27/2015   • Tension headache 08/27/2015   • Nausea 08/27/2015   • Hyperlipidemia with target LDL less than 130 07/30/2015   • Anxiety 07/16/2015   • Family history of diabetes mellitus 06/30/2015   • Obesity 06/30/2015       Past Medical History:   Diagnosis Date   • Hyperlipidemia LDL goal < 130 7/30/2015       Current Outpatient Prescriptions   Medication Sig Dispense Refill   • escitalopram (LEXAPRO) 10 MG Tab Take 1 Tab by mouth every day. 90 Tab 1   • hydrOXYzine (ATARAX) 25 MG TABS Take 1 Tab by mouth 3 times a day as needed for Anxiety. 30 Tab 3     No current facility-administered medications for this visit.        Allergies as of 08/21/2018   • (No Known Allergies)       Social History     Social History   • Marital status:      Spouse name: N/A   • Number of children: N/A   • Years of education: N/A     Occupational History   • Not on file.     Social History  Main Topics   • Smoking status: Never Smoker   • Smokeless tobacco: Never Used   • Alcohol use 1.0 oz/week     2 Glasses of wine per week      Comment: once weekly, 1-2 glasses of wine, occasionally   • Drug use: No   • Sexual activity: Yes     Partners: Male      Comment: tubes are tied     Other Topics Concern   • Not on file     Social History Narrative   • No narrative on file       Family History   Problem Relation Age of Onset   • Hypertension Mother    • Arthritis Mother    • Diabetes Father    • Heart Disease Father    • Heart Attack Father    • Cancer Maternal Grandmother         breast       Past Surgical History:   Procedure Laterality Date   • REPEAT C SECTION  12/23/2013    Performed by Dony Bales M.D. at [facility]   • GYN SURGERY  primary c section    2010       ROS: As per HPI. Additional pertinent symptoms as noted below.  Review of Systems   Constitutional: Negative for fever, chills, weight loss, malaise/fatigue and diaphoresis.   HENT: Negative for ear discharge, ear pain, hearing loss and tinnitus. Positive for ears itching.   Eyes: Negative for blurred vision, double vision, pain, discharge and redness.   Respiratory: Negative for cough, hemoptysis, sputum production, shortness of breath and wheezing.    Cardiovascular: Negative for chest pain, palpitations, orthopnea, leg swelling and PND.   Gastrointestinal: Negative for heartburn, nausea, vomiting, abdominal pain, diarrhea, constipation and blood in stool.   Genitourinary: Negative for dysuria, urgency, frequency, hematuria and flank pain.   Musculoskeletal: Negative for myalgias, back pain, joint pain and neck pain.   Skin: Negative for itching and rash.   Neurological: Negative for dizziness, tingling, tremors, sensory change, focal weakness, loss of consciousness, weakness and headaches.   Psychiatric/Behavioral: Negative for depression and suicidal ideas. The patient is not nervous/anxious and does not have insomnia.   "      /68   Pulse 92   Temp 36.9 °C (98.4 °F)   Ht 1.702 m (5' 7\")   Wt 108 kg (238 lb)   LMP 07/21/2018   SpO2 96%   Breastfeeding? No   BMI 37.28 kg/m²     Physical Exam  Physical Exam   Constitutional: Oriented to person, place, and time and well-developed, well-nourished, and in no distress. Vital signs are normal.   HENT:   Head: Normocephalic and atraumatic.   Mouth/Throat: Oropharynx is clear and moist.   Eyes: Conjunctivae are normal.   Neck: Neck supple. No JVD present. No tracheal deviation present.   Cardiovascular: Normal rate.  Exam reveals no gallop and no friction rub.    No murmur heard.  Pulmonary/Chest: Effort normal and breath sounds normal. No respiratory distress. She has no wheezes. She has no rales. She exhibits no tenderness.   Abdominal: Soft. Bowel sounds are normal. She exhibits no mass. There is no tenderness. There is no rebound and no guarding.   Musculoskeletal: Normal range of motion. She exhibits no edema.   Lymphadenopathy:     She has no cervical adenopathy.   Neurological: She is alert and oriented to person, place, and time. She has normal strength and normal reflexes. Gait normal. GCS score is 15.   Skin: No rash noted. No erythema. No pallor.       Assessment and Plan    Health maintenance examination  - Colon cancer colonoscopy screening: not indicated at this age.  - Breast cancer mammography screening: not indicated at this age.  - Cervical cancer PAP smear screening: was done in 2014 and it was normal , had vaginal growth after delivery which was excised, she was seeing [facility], patient is not sure if it was tumor or abnormal cervix, had discussion patient wanted to be seen in [facility] health clinic for pelvic exam and Pap smear before waiting to get the medical records from [facility].  - Lung cancer screening: non smoker no screening indicated  - AAA screening: Not indicated.  - Osteoporosis screening: Not indicated at this age.  - HIV " screening: Bone in 2016 and was negative.   - flu vaccine: recommended to patient.   - Tdap: Administered in 11/2013  - PNA vac: not indicated at this age.   - Basic Labs within the last 12 months: Order CBC, CMP, hemoglobin A1c and lipid profile.   - Vit D: Order today, she has history of vitamin D deficiency.  - Shingles vac: not indicated at this age.    Anxiety  - Diagnosed in 2015.  - tried multiple medications and the patient responded well to Lexapro 10 mg daily with when necessary Atarax.  - not compliant with her medications, misses 2-3 doses per week.  -  Current KHOA score 14.  Plan:  - Order TSH.  - Counseled patient on medication compliance.  - Refill Lexapro 10 mg daily.  - Follow-up in 3 months.  - Might consider referral for cognitive behavioral therapy in the future.      Obesity (BMI 30-39.9)  - 32 years old female with Body mass index is 37.28 kg/m².  - Family history of hypertension and diabetes.  - Counseled the patient on increasing physical activity, exercise for 150 minutes per week and eating healthy food.  - Referral to health improvement program.      Followup: Return in about 3 months (around 11/21/2018).  Signed by: Ramirez Schultz M.D.

## 2018-08-21 NOTE — ASSESSMENT & PLAN NOTE
- Diagnosed in 2015.  - tried multiple medications and the patient responded well to Lexapro 10 mg daily with when necessary Atarax.  - not compliant with her medications, misses 2-3 doses per week.  -  Current KHOA score 14.  Plan:  - Order TSH.  - Counseled patient on medication compliance.  - Refill Lexapro 10 mg daily.  - Follow-up in 3 months.  - Might consider referral for cognitive behavioral therapy in the future.

## 2018-08-21 NOTE — LETTER
[facility]  Ramirez Schultz M.D.  [address]  [place] [address]  Fax: [phone]   Authorization for Release/Disclosure of   Protected Health Information   Name: STAN LIGHT : 1985 SSN: [id number]   Address: [address] [place] [address] [place] [address] Phone:    [phone] (home)    I authorize the entity listed below to release/disclose the PHI below to:   [facility]/Ramirez Schultz M.D. and Ramirez Schultz M.D.   Provider or Entity Name:  [facility]    Address   City, State, Zip   Phone:      Fax:     Reason for request: continuity of care   Information to be released:    [  ] LAST COLONOSCOPY,  including any PATH REPORT and follow-up  [  ] LAST FIT/COLOGUARD RESULT [  ] LAST DEXA  [  ] LAST MAMMOGRAM  [  ] LAST PAP  [  ] LAST LABS [  ] RETINA EXAM REPORT  [  ] IMMUNIZATION RECORDS  [  ] Release all info      [  ] Check here and initial the line next to each item to release ALL health information INCLUDING  _____ Care and treatment for drug and / or alcohol abuse  _____ HIV testing, infection status, or AIDS  _____ Genetic Testing    DATES OF SERVICE OR TIME PERIOD TO BE DISCLOSED: _____________  I understand and acknowledge that:  * This Authorization may be revoked at any time by you in writing, except if your health information has already been used or disclosed.  * Your health information that will be used or disclosed as a result of you signing this authorization could be re-disclosed by the recipient. If this occurs, your re-disclosed health information may no longer be protected by State or Federal laws.  * You may refuse to sign this Authorization. Your refusal will not affect your ability to obtain treatment.  * This Authorization becomes effective upon signing and will  on (date) __________.      If no date is indicated, this Authorization will  one (1) year from the signature date.    Name: Stan Light    Signature:   Date:     2018          PLEASE FAX REQUESTED RECORDS BACK TO: [phone]

## 2018-08-21 NOTE — PATIENT INSTRUCTIONS
- please take over the counter Citrizine tabs 10 mg   - please do the labs   1500 Calorie Diabetic Diet  The 1500 calorie diabetic diet limits calories to 1500 each day. Following this diet and making healthy meal choices can help improve overall health. It controls blood glucose (sugar) levels and can also help lower blood pressure and cholesterol.   SERVING SIZES  Measuring foods and serving sizes helps to make sure you are getting the right amount of food. The list below tells how big or small some common serving sizes are.  · 1 oz.........4 stacked dice.   · 3 oz.........Deck of cards.   · 1 tsp........Tip of little finger.   · 1 tbs........Thumb.   · 2 tbs........Golf ball.   · ½ cup.......Half of a fist.   · 1 cup........A fist.   GUIDELINES FOR CHOOSING FOODS  The goal of this diet is to eat a variety of foods and limit calories to 1500 each day. This can be done by choosing foods that are low in calories and fat. The diet also suggests eating small amounts of food frequently. Doing this helps control your blood glucose levels, so they do not get too high or too low. Each meal or snack may include a protein food source to help you feel more satisfied. Try to eat about the same amount of food around the same time each day. This includes weekend days, travel days, and days off work. Space your meals about 4 to 5 hours apart, and add a snack between them, if you wish.   For example, a daily food plan could include breakfast, a morning snack, lunch, dinner, and an evening snack. Healthy meals and snacks have different types of foods, including whole grains, vegetables, fruits, lean meats, poultry, fish, and dairy products. As you plan your meals, select a variety of foods. Choose from the bread and starch, vegetable, fruit, dairy, and meat/protein groups. Examples of foods from each group are listed below, with their suggested serving sizes. Use measuring cups and spoons to become familiar with what a healthy  portion looks like.  Bread and Starch  Each serving equals 15 grams of carbohydrate.  · 1 slice bread.   · ¼ bagel.   · ¾ cup cold cereal (unsweetened).   · ½ cup hot cereal or mashed potatoes.   · 1 small potato (size of a computer mouse).   ·  cup cooked pasta or rice.   · ½ English muffin.   · 1 cup broth-based soup.   · 3 cups of popcorn.   · 4 to 6 whole-wheat crackers.   · ½ cup cooked beans, peas, or corn.   Vegetables  Each serving equals 5 grams of carbohydrate.  · ½ cup cooked vegetables.   · 1 cup raw vegetables.   · ½ cup tomato or vegetable juice.   Fruit  Each serving equals 15 grams of carbohydrate.  · 1 small apple or orange.   · 1 ¼ cup watermelon or strawberries.   · ½ cup applesauce (no sugar added).   · 2 tbs raisins.   · ½ banana.   · ½ cup canned fruit, packed in water or in its own juice.   · ½ cup unsweetened fruit juice.   Dairy  Each serving equals 12 to 15 grams of carbohydrate.  · 1 cup fat-free milk.   · 6 oz artificially sweetened yogurt or plain yogurt.   · 1 cup low-fat buttermilk.   · 1 cup soy milk.   · 1 cup almond milk.   Meat/Protein  · 1 large egg.   · 2 to 3 oz meat, poultry, or fish.   · ¼ cup low-fat cottage cheese.   · 1 tbs peanut butter.   · 1 oz low-fat cheese.   · ¼ cup tuna, packed in water.   · ½ cup tofu.   Fat  · 1 tsp oil.   · 1 tsp trans-fat-free margarine.   · 1 tsp butter.   · 1 tsp mayonnaise.   · 2 tbs avocado.   · 1 tbs salad dressing.   · 1 tbs cream cheese.   · 2 tbs sour cream.   SAMPLE 1500 CALORIE DIET PLAN  Breakfast  · ½ whole-wheat English muffin (1 carb serving).   · 1 tsp trans-fat-free margarine.   · 1 scrambled egg.   · 1 cup fat-free milk (1 carb serving).   · 1 small orange (1 carb serving).   Lunch  · Chicken wrap.   · 1 whole-wheat tortilla, 8-inch (1½ carb servings).   · 2 oz chicken breast, sliced.   · 2 tbs low-fat salad dressing, such as Italian.   · ¼ cup shredded lettuce.   · 2 slices tomato.   · ½ cup carrot sticks.   · 1 small apple (1  carb serving).   Afternoon Snack  · 3 fabiola cracker squares (1 carb serving).   · 1 tbs peanut butter.   Dinner  · 2 oz lean pork chop, broiled.   · 1 cup brown rice (3 carb servings).   · ½ cup steamed carrots.   · ½ cup green beans.   · 1 cup fat-free milk (1 carb serving).   · 1 tsp trans-fat-free margarine.   Evening Snack  · ½ cup low-fat cottage cheese.   · 1 small peach or pear, sliced (or ½ cup canned in water) (1 carb serving).   MEAL PLAN  You can use this worksheet to help you make a daily meal plan based on the 1500 calorie diabetic diet suggestions. If you are using this plan to help you control your blood glucose, you may interchange carbohydrate containing foods (dairy, starches, and fruits). Select a variety of fresh foods of varying colors and flavors. The total amount of carbohydrate in your meals or snacks is more important than making sure you include all of the food groups every time you eat. You can choose from approximately this many of the following foods to build your day's meals:  · 6 Starches.   · 3 Vegetables.   · 2 Fruits.   · 2 Dairy.   · 4 to 6 oz Meat/Protein.   · Up to 3 Fats.   Your dietician can use this worksheet to help you decide how many servings and which types of foods are right for you.  BREAKFAST  Food Group and Servings / Food Choice  Starch _________________________________________________________  Dairy __________________________________________________________  Fruit ___________________________________________________________  Meat/Protein____________________________________________________  Fat ____________________________________________________________  LUNCH  Food Group and Servings / Food Choice   Starch _________________________________________________________  Meat/Protein ___________________________________________________  Vegetables _____________________________________________________  Fruit __________________________________________________________  Dairy  __________________________________________________________  Fat ____________________________________________________________  AFTERNOON SNACK  Food Group and Servings / Food Choice  Dairy __________________________________________________________  Starch _________________________________________________________  Meat/Protein____________________________________________________  Fruit ___________________________________________________________  DINNER  Food Group and Servings / Food Choice  Starch _________________________________________________________  Meat/Protein ___________________________________________________  Dairy __________________________________________________________  Vegetable ______________________________________________________  Fruit ___________________________________________________________  Fat ____________________________________________________________  EVENING SNACK  Food Group and Servings / Food Choice  Fruit ___________________________________________________________  Meat/Protein ____________________________________________________  Dairy __________________________________________________________  Starch __________________________________________________________  DAILY TOTALS  Starches _________________________  Vegetables _______________________  Fruits ____________________________  Dairy ____________________________  Meat/Protein_____________________  Fats _____________________________  Document Released: 07/10/2006 Document Revised: 03/11/2013 Document Reviewed: 11/04/2010  [employer]® Patient Information ©2013 [facility].

## 2018-08-21 NOTE — ASSESSMENT & PLAN NOTE
- Colon cancer colonoscopy screening: not indicated at this age.  - Breast cancer mammography screening: not indicated at this age.  - Cervical cancer PAP smear screening: was done in 2014 and it was normal , had vaginal growth after delivery which was excised, she was seeing [facility], patient is not sure if it was tumor or abnormal cervix, had discussion patient wanted to be seen in [facility] clinic for pelvic exam and Pap smear before waiting to get the medical records from [facility].  - Lung cancer screening: non smoker no screening indicated  - AAA screening: Not indicated.  - Osteoporosis screening: Not indicated at this age.  - HIV screening: Bone in 2016 and was negative.   - flu vaccine: recommended to patient.   - Tdap: Administered in 11/2013  - PNA vac: not indicated at this age.   - Basic Labs within the last 12 months: Order CBC, CMP, hemoglobin A1c and lipid profile.   - Vit D: Order today, she has history of vitamin D deficiency.  - Shingles vac: not indicated at this age.

## 2018-08-21 NOTE — ASSESSMENT & PLAN NOTE
- 32 years old female with Body mass index is 37.28 kg/m².  - Family history of hypertension and diabetes.  - Counseled the patient on increasing physical activity, exercise for 150 minutes per week and eating healthy food.  - Referral to health improvement program.

## 2018-09-10 ENCOUNTER — HOSPITAL ENCOUNTER (OUTPATIENT)
Facility: MEDICAL CENTER | Age: 33
End: 2018-09-10
Attending: INTERNAL MEDICINE
Payer: COMMERCIAL

## 2018-09-10 ENCOUNTER — OFFICE VISIT (OUTPATIENT)
Dept: INTERNAL MEDICINE | Facility: MEDICAL CENTER | Age: 33
End: 2018-09-10
Payer: COMMERCIAL

## 2018-09-10 VITALS
BODY MASS INDEX: 37.23 KG/M2 | HEIGHT: 67 IN | SYSTOLIC BLOOD PRESSURE: 108 MMHG | DIASTOLIC BLOOD PRESSURE: 80 MMHG | WEIGHT: 237.2 LBS | HEART RATE: 69 BPM | OXYGEN SATURATION: 98 %

## 2018-09-10 DIAGNOSIS — Z01.419 PAP SMEAR, LOW-RISK: ICD-10-CM

## 2018-09-10 DIAGNOSIS — Z12.4 ROUTINE CERVICAL SMEAR: ICD-10-CM

## 2018-09-10 DIAGNOSIS — N90.89 LABIAL LESION: ICD-10-CM

## 2018-09-10 PROCEDURE — 88175 CYTOPATH C/V AUTO FLUID REDO: CPT

## 2018-09-10 PROCEDURE — 99000 SPECIMEN HANDLING OFFICE-LAB: CPT | Performed by: INTERNAL MEDICINE

## 2018-09-10 PROCEDURE — 99385 PREV VISIT NEW AGE 18-39: CPT | Performed by: INTERNAL MEDICINE

## 2018-09-10 PROCEDURE — 87624 HPV HI-RISK TYP POOLED RSLT: CPT
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
## 2018-09-10 NOTE — LETTER
[facility]  Ramirez Schultz M.D.  [address]  [place] [address]  Fax: [phone]   Authorization for Release/Disclosure of   Protected Health Information   Name: STAN LIGHT : 1985 SSN: [id number]   Address: [address] [place], [place] [address] [address] Phone:    [phone] (home)    I authorize the entity listed below to release/disclose the PHI below to:   [facility]/Ramirez Schultz M.D. and Luiza Petersen M.D.   Provider or Entity Name:     Address   City, State, Zip   Phone:      Fax:     Reason for request: continuity of care   Information to be released:    [  ] LAST COLONOSCOPY,  including any PATH REPORT and follow-up  [  ] LAST FIT/COLOGUARD RESULT [  ] LAST DEXA  [  ] LAST MAMMOGRAM  [ x ] LAST PAP  [  ] LAST LABS [  ] RETINA EXAM REPORT  [  ] IMMUNIZATION RECORDS  [  ] Release all info      [  ] Check here and initial the line next to each item to release ALL health information INCLUDING  _____ Care and treatment for drug and / or alcohol abuse  _____ HIV testing, infection status, or AIDS  _____ Genetic Testing    DATES OF SERVICE OR TIME PERIOD TO BE DISCLOSED: _____________  I understand and acknowledge that:  * This Authorization may be revoked at any time by you in writing, except if your health information has already been used or disclosed.  * Your health information that will be used or disclosed as a result of you signing this authorization could be re-disclosed by the recipient. If this occurs, your re-disclosed health information may no longer be protected by State or Federal laws.  * You may refuse to sign this Authorization. Your refusal will not affect your ability to obtain treatment.  * This Authorization becomes effective upon signing and will  on (date) __________.      If no date is indicated, this Authorization will  one (1) year from the signature date.    Name: Stan Light    Signature:   Date:     9/10/2018       PLEASE FAX  REQUESTED RECORDS BACK TO: [phone]

## 2018-09-11 NOTE — PATIENT INSTRUCTIONS
Melanoma  Melanoma is a form of skin cancer that begins in melanocytes. Melanocytes are the skin cells that produce pigment. Melanoma starts as a mole on the skin and can spread to other parts of the body. If found early, many cases of melanoma are curable.  What are the causes?  The exact cause is unknown.  What increases the risk?  · Spending a lot of time in the sun, under a sunlamp, or in a tanning castillo.  · Having sunburn that blisters. The more blistering sunburns you have, the higher your risk of melanoma.  · Spending time in parts of the world with more intense sunlight.  · Living in a hot, [place] climate.  · Having fair skin that does not tan easily.  · Having had melanoma before.  · Having a family history of melanoma.  · Having more than 100 skin moles.  What are the signs or symptoms?  · ABCDE changes in a mole. ABCDE stands for:  ¨ Asymmetry. This means the mole has an irregular shape. It is not round or oval.  ¨ Border. This means the mole has an irregular or bumpy border.  ¨ Color. This means the mole has multiple colors in it, including brown, black, blue, red, or tan.  ¨ Diameter. This means the mole is more than 0.2 in (6 mm) across.  ¨ Evolving. This refers to any unusual changes or symptoms in the mole, such as pain, itching, stinging, sensitivity, or bleeding.  · A new mole.  · Swollen lymph nodes.  · Shortness of breath.  · Bone pain.  · Headache.  · Seizures.  · Visual problems.  How is this diagnosed?  Your health care provider will take a tissue sample from the mole to examine under a microscope (biopsy). The biopsy will reveal whether melanoma has spread to deeper layers of the skin. Your health care provider will also order tests, including:  · Blood tests.  · Chest X-rays.  · A CT scan.  · A bone scan.  How is this treated?  You will have surgery to remove the cancer. Your lymph nodes may also be removed during the surgery. If melanoma has spread to other organs, such as the liver, lungs,  bone, or brain, you will need additional treatment.  How is this prevented?  Melanoma may come back (recur) after it has been treated. Your risk of recurrence is higher if you had thick or ulcerated tumors or patches of tumors. Generally, the more advanced your melanoma, the more likely it will recur. You can help prevent melanoma from recurring by staying out of the sun, especially during peak midafternoon hours. When you are outdoors or in the sun:  · Wear long sleeves, a hat, and sunglasses that block UV light when possible.  · Apply a sunscreen with an SPF of 30 or higher regularly.  Take these precautions on cloudy days and in the winter, even if you will be outdoors for only a short period of time.  Contact a health care provider if:  · You notice any new growths or changes in your skin.  · You have had melanoma removed and you notice a new growth in the same location.  This information is not intended to replace advice given to you by your health care provider. Make sure you discuss any questions you have with your health care provider.  Document Released: 12/18/2006 Document Revised: 05/25/2017 Document Reviewed: 02/11/2015  [employer] Interactive Patient Education © 2017 [employer]

## 2018-09-12 DIAGNOSIS — N90.89 LABIAL LESION: ICD-10-CM

## 2018-09-12 DIAGNOSIS — Z01.419 PAP SMEAR, LOW-RISK: ICD-10-CM

## 2018-09-12 DIAGNOSIS — Z12.4 ROUTINE CERVICAL SMEAR: ICD-10-CM

## 2018-09-13 LAB
CYTOLOGY REG CYTOL: NORMAL
HPV HR 12 DNA CVX QL NAA+PROBE: NEGATIVE
HPV16 DNA SPEC QL NAA+PROBE: NEGATIVE
HPV18 DNA SPEC QL NAA+PROBE: NEGATIVE
SPECIMEN SOURCE: NORMAL

## 2018-10-13 ENCOUNTER — OFFICE VISIT (OUTPATIENT)
Dept: URGENT CARE | Facility: PHYSICIAN GROUP | Age: 33
End: 2018-10-13
Payer: COMMERCIAL

## 2018-10-13 VITALS
SYSTOLIC BLOOD PRESSURE: 138 MMHG | OXYGEN SATURATION: 96 % | WEIGHT: 240 LBS | TEMPERATURE: 97.5 F | HEART RATE: 79 BPM | BODY MASS INDEX: 37.67 KG/M2 | RESPIRATION RATE: 16 BRPM | DIASTOLIC BLOOD PRESSURE: 80 MMHG | HEIGHT: 67 IN

## 2018-10-13 DIAGNOSIS — J30.2 SEASONAL ALLERGIC RHINITIS, UNSPECIFIED TRIGGER: ICD-10-CM

## 2018-10-13 DIAGNOSIS — J01.00 ACUTE NON-RECURRENT MAXILLARY SINUSITIS: ICD-10-CM

## 2018-10-13 LAB
INT CON NEG: NEGATIVE
INT CON POS: POSITIVE
S PYO AG THROAT QL: NEGATIVE

## 2018-10-13 PROCEDURE — 87880 STREP A ASSAY W/OPTIC: CPT | Performed by: PHYSICIAN ASSISTANT

## 2018-10-13 PROCEDURE — 99214 OFFICE O/P EST MOD 30 MIN: CPT | Performed by: PHYSICIAN ASSISTANT

## 2018-10-13 RX ORDER — FEXOFENADINE HCL 180 MG/1
180 TABLET ORAL DAILY
Qty: 30 TAB | Refills: 3 | Status: SHIPPED | OUTPATIENT
Start: 2018-10-13 | End: 2022-01-04

## 2018-10-13 RX ORDER — AMOXICILLIN AND CLAVULANATE POTASSIUM 875; 125 MG/1; MG/1
1 TABLET, FILM COATED ORAL 2 TIMES DAILY
Qty: 20 TAB | Refills: 0 | Status: SHIPPED | OUTPATIENT
Start: 2018-10-13 | End: 2018-11-06

## 2018-10-13 NOTE — PROGRESS NOTES
Chief Complaint   Patient presents with   • Pharyngitis     x 1 week        HISTORY OF PRESENT ILLNESS: Patient is a 32 y.o. female who presents today for 1 week of waxing and waning sore throat (less painful today), some nasal congestion, bilateral ear fullness.   She has felt hot and cold on and off but no confirmed fevers.   She did get a lot of brown/yellow mucus out of her sinuses and that seems better today.  She is coughing a good deal and was getting mucus up.   She has been using Flonase with some mild relief.     Patient Active Problem List    Diagnosis Date Noted   • Obesity (BMI 30-39.9) 2018   • Health maintenance examination 2018   • Sleep disorder breathing 2017   • PATRICIA positive for HSV 2016   • Vitamin D deficiency 2015   • Tension headache 2015   • Nausea 2015   • Hyperlipidemia with target LDL less than 130 2015   • Anxiety 2015   • Family history of diabetes mellitus 2015   • Obesity 2015       Allergies:Patient has no known allergies.    Current Outpatient Prescriptions Ordered in [facility]   Medication Sig Dispense Refill   • escitalopram (LEXAPRO) 10 MG Tab Take 1 Tab by mouth every day. 90 Tab 1   • hydrOXYzine (ATARAX) 25 MG TABS Take 1 Tab by mouth 3 times a day as needed for Anxiety. 30 Tab 3     No current Epic-ordered facility-administered medications on file.        Past Medical History:   Diagnosis Date   • Hyperlipidemia LDL goal < 130 2015       Social History   Substance Use Topics   • Smoking status: Never Smoker   • Smokeless tobacco: Never Used   • Alcohol use Yes      Comment: /month        Family Status   Relation Status   • Mo Alive   • Fa    • Bro Alive   • MGMo    • Bro Alive     Family History   Problem Relation Age of Onset   • Hypertension Mother    • Arthritis Mother    • Diabetes Father    • Heart Disease Father    • Heart Attack Father    • Cancer Maternal Grandmother         breast  "      ROS:  Review of Systems   Constitutional: SEE HPI  HENT: SEE HPI  Eyes: Negative for blurred vision.   Respiratory: SEE HPI  Cardiovascular: Negative for chest pain, palpitations, orthopnea and leg swelling.   Gastrointestinal: Negative for heartburn, nausea, vomiting and abdominal pain.   Genitourinary: Negative for dysuria, urgency and frequency.     Exam:  Blood pressure 138/80, pulse 79, temperature 36.4 °C (97.5 °F), temperature source Temporal, resp. rate 16, height 1.702 m (5' 7\"), weight 108.9 kg (240 lb), SpO2 96 %, not currently breastfeeding.  General:  Well nourished, well developed female in NAD  Eyes: PERRLA, EOM within normal limits, no conjunctival injection, no scleral icterus, visual fields and acuity grossly intact.  Ears: Normal shape and symmetry, no tenderness, no discharge. External canals are without any significant edema or erythema. Tympanic membranes are without any inflammation, no effusion. Gross auditory acuity is intact  Nose: Symmetrical, mild bilateral sinus TTP,  boggy erythematous turbinates, scant purulent rhinorrhea  Mouth: reasonable hygiene, moderate posterior phayrnx erythema without exudates or tonsillar enlargement.  Neck: no masses, range of motion within normal limits, no tracheal deviation. No lymphadenopathy  Pulmonary: Normal respiratory effort, no wheezes, crackles, or rhonchi.  Cardiovascular: regular rate and rhythm without murmurs, rubs, or gallops.  Skin: No visible rashes or lesion. Warm, pink, dry.   Extremities: no clubbing, cyanosis, or edema.  Neuro: A&O x 3. Speech normal/clear.  Normal gait.         Assessment/Plan:  1. Acute non-recurrent maxillary sinusitis  amoxicillin-clavulanate (AUGMENTIN) 875-125 MG Tab    fexofenadine (ALLEGRA) 180 MG tablet    POCT Rapid Strep A   2. Seasonal allergic rhinitis, unspecified trigger         -strep negative.   -continue allergy and sinus care, Flonase, add daily antihistamine.   -humidifier/steam      Supportive " care, differential diagnoses, and indications for immediate follow-up discussed with patient.   Pathogenesis of diagnosis discussed including typical length and natural progression.   Instructed to return to clinic or nearest emergency department for any change in condition, further concerns, or worsening of symptoms.  Patient states understanding of the plan of care and discharge instructions.        Adrienne Correa P.A.-C.

## 2018-11-06 ENCOUNTER — OFFICE VISIT (OUTPATIENT)
Dept: INTERNAL MEDICINE | Facility: MEDICAL CENTER | Age: 33
End: 2018-11-06
Payer: COMMERCIAL

## 2018-11-06 VITALS
DIASTOLIC BLOOD PRESSURE: 80 MMHG | HEART RATE: 78 BPM | SYSTOLIC BLOOD PRESSURE: 110 MMHG | WEIGHT: 246.8 LBS | OXYGEN SATURATION: 98 % | BODY MASS INDEX: 38.74 KG/M2 | HEIGHT: 67 IN | TEMPERATURE: 98 F

## 2018-11-06 DIAGNOSIS — F41.9 ANXIETY: ICD-10-CM

## 2018-11-06 DIAGNOSIS — N90.89 VULVAL LESION: ICD-10-CM

## 2018-11-06 PROCEDURE — 99213 OFFICE O/P EST LOW 20 MIN: CPT | Mod: GE | Performed by: INTERNAL MEDICINE

## 2018-11-06 RX ORDER — ESCITALOPRAM OXALATE 10 MG/1
10 TABLET ORAL DAILY
Qty: 90 TAB | Refills: 1 | Status: SHIPPED | OUTPATIENT
Start: 2018-11-06 | End: 2022-01-04

## 2018-11-06 NOTE — ASSESSMENT & PLAN NOTE
- Diagnosed in 2015.  - tried multiple medications and the patient responded well to Lexapro 10 mg daily with when necessary Atarax.  - not compliant with her medications, misses 2-3 doses per week.  -  Current KHOA score 21.  -Lab work ordered in the last visit is not done yet including TSH.    - Counseled patient on medication compliance.  -Continue Lexapro 10 mg daily.  - Follow-up in 3 months.  - Might consider referral for cognitive behavioral therapy in the future.

## 2018-11-06 NOTE — PROGRESS NOTES
Established Patient    Catherine presents today with the following:    CC: Follow-up anxiety and lesion over the vulva    HPI:Catherine Porter is a 33 y.o. Female with medical history of anxiety and vulvar lesion presented to the clinic today for follow-up.    Anxiety: Diagnosed in 2015, patient had difficult situations while she was passing through a divorce, her PCP tried multiple medications and the patient responded well to Lexapro 10 mg daily with when necessary Atarax, she is not very compliant with her medications, misses 2-3 doses per week. Current KHOA score 21.  Patient did not do the lab work ordered in the last visit including TSH.    Vulvar lesion: Records received from OBGYN showed valvular abnormality which was excised in 2014, no pathology report received, per patient she was told that it was melanoma, It was a dark mole on her labia. She was supposed to have 6 month follow-up for the next 5 years. This did not occur because she moved out of town. She noted that a few weeks ago she had a bit of discomfort down there in that exact area.  [facility] for a Pap smear 1 month ago, On exam she has a nickel-sized uniformly violaceous to black lesion on her left labia majora. It is smooth.  She was referred for OB/GYN, per patient they are in the process of obtaining insurance approval for surgical excision.          Patient Active Problem List    Diagnosis Date Noted   • Obesity (BMI 30-39.9) 08/21/2018   • Health maintenance examination 08/21/2018   • Sleep disorder breathing 03/16/2017   • PATRICIA positive for HSV 09/16/2016   • Vitamin D deficiency 08/27/2015   • Tension headache 08/27/2015   • Nausea 08/27/2015   • Hyperlipidemia with target LDL less than 130 07/30/2015   • Anxiety 07/16/2015   • Family history of diabetes mellitus 06/30/2015   • Obesity 06/30/2015       Current Outpatient Prescriptions   Medication Sig Dispense Refill   • IBUPROFEN PO Take  by mouth.     • escitalopram  (LEXAPRO) 10 MG Tab Take 1 Tab by mouth every day. 90 Tab 1   • fexofenadine (ALLEGRA) 180 MG tablet Take 1 Tab by mouth every day. 30 Tab 3   • hydrOXYzine (ATARAX) 25 MG TABS Take 1 Tab by mouth 3 times a day as needed for Anxiety. 30 Tab 3     No current facility-administered medications for this visit.        Social History     Social History   • Marital status:      Spouse name: N/A   • Number of children: N/A   • Years of education: N/A     Occupational History   • Not on file.     Social History Main Topics   • Smoking status: Never Smoker   • Smokeless tobacco: Never Used   • Alcohol use Yes      Comment: 4/month or less   • Drug use: No   • Sexual activity: Yes     Partners: Male     Birth control/ protection: Surgical      Comment: tubes are tied     Other Topics Concern   • Not on file     Social History Narrative   • No narrative on file       Family History   Problem Relation Age of Onset   • Hypertension Mother    • Arthritis Mother    • Diabetes Father    • Heart Disease Father    • Heart Attack Father    • Cancer Maternal Grandmother         breast       ROS: As per HPI. Additional pertinent systems as noted below.  Constitutional: Negative for chills and fever.   HENT: Negative for ear pain and sore throat.    Eyes: Negative for discharge and redness.   Respiratory: Negative for cough, hemoptysis, wheezing and stridor.    Cardiovascular: Negative for chest pain, palpitations and leg swelling.   Gastrointestinal: Negative for abdominal pain, constipation, diarrhea, heartburn, nausea and vomiting.   Genitourinary: Negative for dysuria, flank pain and hematuria.   Musculoskeletal: Negative for falls and myalgias.   Skin: Negative for itching and rash.   Neurological: Negative for dizziness, seizures, loss of consciousness and headaches.   Endo/Heme/Allergies: Negative for polydipsia. Does not bruise/bleed easily.   Psychiatric/Behavioral: Negative for substance abuse and suicidal ideas.  "        /80 (BP Location: Left arm, Patient Position: Sitting, BP Cuff Size: Large adult)   Pulse 78   Temp 36.7 °C (98 °F) (Temporal)   Ht 1.702 m (5' 7\")   Wt 111.9 kg (246 lb 12.8 oz)   SpO2 98%   BMI 38.65 kg/m²     Physical Exam  General:  Alert and oriented, No apparent distress.    Eyes: Pupils equal and reactive. No scleral icterus.    Throat: Clear no erythema or exudates noted.    Neck: Supple. No lymphadenopathy noted. Thyroid not enlarged.    Lungs: Clear to auscultation and percussion bilaterally.    Cardiovascular: Regular rate and rhythm. No murmurs, rubs or gallops.    Abdomen:  Benign. No rebound or guarding noted.    Extremities: No clubbing, cyanosis, edema.    Skin: Clear. No rash or suspicious skin lesions noted.      Note: I have reviewed all pertinent labs and diagnostic tests associated with this visit with specific comments listed under the assessment and plan below      Assessment and Plan    Anxiety  - Diagnosed in 2015.  - tried multiple medications and the patient responded well to Lexapro 10 mg daily with when necessary Atarax.  - not compliant with her medications, misses 2-3 doses per week.  -  Current KHOA score 21.  -Lab work ordered in the last visit is not done yet including TSH.    - Counseled patient on medication compliance.  -Continue Lexapro 10 mg daily.  - Follow-up in 3 months.  - Might consider referral for cognitive behavioral therapy in the future.      Vulval lesion  - Records received from OBGYN showed valvular abnormality which was excised in 2014, no pathology report received.  - per patient she was told that it was melanoma, It was a dark mole on her labia. She was supposed to have 6 month follow-up for the next 5 years. This did not occur because she moved out of town.   - Was seen in [facility] for a Pap smear 1 month ago, On exam she has a nickel-sized uniformly violaceous to black lesion on her left labia majora. It is smooth.  She was " referred for OB/GYN, per patient they are in the process of obtaining insurance approval for surgical excision.  -Continue following up with OB/GYN.        Followup: Return in about 3 months (around 2/6/2019).      Signed by: Ramirez Schultz M.D.

## 2018-11-06 NOTE — ASSESSMENT & PLAN NOTE
- Records received from OBGYN showed valvular abnormality which was excised in 2014, no pathology report received.  - per patient she was told that it was melanoma, It was a dark mole on her labia. She was supposed to have 6 month follow-up for the next 5 years. This did not occur because she moved out of town.   - Was seen in [facility] for a Pap smear 1 month ago, On exam she has a nickel-sized uniformly violaceous to black lesion on her left labia majora. It is smooth.  She was referred for OB/GYN, per patient they are in the process of obtaining insurance approval for surgical excision.  -Continue following up with OB/GYN.

## 2018-11-06 NOTE — PATIENT INSTRUCTIONS
- please do the lab work   - please follow up with OBGYN   - please take lexapro daily     Panic Attacks  Panic attacks are sudden, short feelings of great fear or discomfort. You may have them for no reason when you are relaxed, when you are uneasy (anxious), or when you are sleeping.  Follow these instructions at home:  · Take all your medicines as told.  · Check with your doctor before starting new medicines.  · Keep all doctor visits.  Contact a doctor if:  · You are not able to take your medicines as told.  · Your symptoms do not get better.  · Your symptoms get worse.  Get help right away if:  · Your attacks seem different than your normal attacks.  · You have thoughts about hurting yourself or others.  · You take panic attack medicine and you have a side effect.  This information is not intended to replace advice given to you by your health care provider. Make sure you discuss any questions you have with your health care provider.  Document Released: 01/20/2012 Document Revised: 05/25/2017 Document Reviewed: 08/01/2014  Elsevier Interactive Patient Education © 2017 [employer]

## 2018-11-26 ENCOUNTER — OFFICE VISIT (OUTPATIENT)
Dept: URGENT CARE | Facility: CLINIC | Age: 33
End: 2018-11-26
Payer: COMMERCIAL

## 2018-11-26 VITALS
RESPIRATION RATE: 16 BRPM | HEART RATE: 65 BPM | TEMPERATURE: 97.5 F | DIASTOLIC BLOOD PRESSURE: 78 MMHG | SYSTOLIC BLOOD PRESSURE: 112 MMHG | WEIGHT: 246 LBS | BODY MASS INDEX: 38.61 KG/M2 | HEIGHT: 67 IN | OXYGEN SATURATION: 99 %

## 2018-11-26 DIAGNOSIS — J06.9 UPPER RESPIRATORY TRACT INFECTION, UNSPECIFIED TYPE: ICD-10-CM

## 2018-11-26 LAB
INT CON NEG: NEGATIVE
INT CON POS: POSITIVE
S PYO AG THROAT QL: NEGATIVE

## 2018-11-26 PROCEDURE — 99214 OFFICE O/P EST MOD 30 MIN: CPT | Performed by: PHYSICIAN ASSISTANT

## 2018-11-26 PROCEDURE — 87880 STREP A ASSAY W/OPTIC: CPT | Performed by: PHYSICIAN ASSISTANT

## 2018-11-26 RX ORDER — BENZONATATE 200 MG/1
200 CAPSULE ORAL 3 TIMES DAILY PRN
Qty: 30 CAP | Refills: 0 | Status: ON HOLD | OUTPATIENT
Start: 2018-11-26 | End: 2018-12-20

## 2018-11-26 RX ORDER — AMOXICILLIN AND CLAVULANATE POTASSIUM 875; 125 MG/1; MG/1
1 TABLET, FILM COATED ORAL 2 TIMES DAILY
Qty: 14 TAB | Refills: 0 | Status: SHIPPED | OUTPATIENT
Start: 2018-11-26 | End: 2018-12-03

## 2018-11-26 ASSESSMENT — ENCOUNTER SYMPTOMS
SENSORY CHANGE: 0
VOMITING: 0
COUGH: 1
SWOLLEN GLANDS: 0
TINGLING: 0
HOARSE VOICE: 0
SPUTUM PRODUCTION: 0
WHEEZING: 0
HEADACHES: 1
SHORTNESS OF BREATH: 0
DIARRHEA: 0
ABDOMINAL PAIN: 0
PALPITATIONS: 0
MYALGIAS: 0
FOCAL WEAKNESS: 0
NAUSEA: 0
NECK PAIN: 0
FEVER: 0
SINUS PAIN: 1
CHILLS: 0
SORE THROAT: 1

## 2018-11-26 NOTE — PROGRESS NOTES
Subjective:      Catherine Porter is a 33 y.o. female who presents with Pharyngitis (x2 days, sore throat, nasal congestion, (R) ear draining. )            Pharyngitis    This is a new problem. Episode onset: 2 days  The problem has been rapidly worsening. There has been no fever. Associated symptoms include congestion (with purulent rhinorrhea ), coughing, ear discharge (several weeks right ear drainage ) and headaches. Pertinent negatives include no abdominal pain, diarrhea, ear pain, hoarse voice, plugged ear sensation, neck pain, shortness of breath, swollen glands or vomiting. Treatments tried: saline rinses. The treatment provided mild relief.       Past Medical History:   Diagnosis Date   • Hyperlipidemia LDL goal < 130 7/30/2015       Past Surgical History:   Procedure Laterality Date   • REPEAT C SECTION  12/23/2013    Performed by Dony Bales M.D. at [facility]   • GYN SURGERY  primary c section    2010       Family History   Problem Relation Age of Onset   • Hypertension Mother    • Arthritis Mother    • Diabetes Father    • Heart Disease Father    • Heart Attack Father    • Cancer Maternal Grandmother         breast       No Known Allergies    Medications, Allergies, and current problem list reviewed today in Epic      Review of Systems   Constitutional: Negative for chills, fever and malaise/fatigue.   HENT: Positive for congestion (with purulent rhinorrhea ), ear discharge (several weeks right ear drainage ), sinus pain and sore throat. Negative for ear pain and hoarse voice.    Respiratory: Positive for cough. Negative for sputum production, shortness of breath and wheezing.    Cardiovascular: Negative for chest pain, palpitations and leg swelling.   Gastrointestinal: Negative for abdominal pain, diarrhea, nausea and vomiting.   Musculoskeletal: Negative for myalgias and neck pain.   Skin: Negative for rash.   Neurological: Positive for headaches. Negative for tingling, sensory change and  "focal weakness.     All other systems reviewed and are negative.          Objective:     /78   Pulse 65   Temp 36.4 °C (97.5 °F) (Temporal)   Resp 16   Ht 1.702 m (5' 7\")   Wt 111.6 kg (246 lb)   SpO2 99%   BMI 38.53 kg/m²      Physical Exam   Constitutional: She is oriented to person, place, and time. She appears well-developed and well-nourished. No distress.   HENT:   Head: Normocephalic and atraumatic.   Right Ear: Tympanic membrane, external ear and ear canal normal.   Left Ear: Tympanic membrane, external ear and ear canal normal.   Nose: Mucosal edema and rhinorrhea present. Right sinus exhibits maxillary sinus tenderness. Left sinus exhibits maxillary sinus tenderness.   Mouth/Throat: Uvula is midline and mucous membranes are normal. Posterior oropharyngeal erythema present. No tonsillar exudate.   Eyes: Conjunctivae are normal.   Neck: Neck supple.   Cardiovascular: Normal rate, regular rhythm and normal heart sounds.  Exam reveals no gallop and no friction rub.    No murmur heard.  Pulmonary/Chest: Effort normal and breath sounds normal. No respiratory distress. She has no wheezes. She has no rales.   Lymphadenopathy:     She has no cervical adenopathy.   Neurological: She is alert and oriented to person, place, and time. No cranial nerve deficit.   Skin: Skin is warm and dry. No rash noted.   Psychiatric: She has a normal mood and affect. Her behavior is normal. Judgment and thought content normal.               Assessment/Plan:     1. Upper respiratory tract infection, unspecified type  POCT Rapid Strep A- negative      amoxicillin-clavulanate (AUGMENTIN) 875-125 MG Tab    benzonatate (TESSALON) 200 MG capsule       Viral etiology discussed.  Encouraged conservative treatment with fluids, rest, humidification, saline rinses, Flonase.   Patient was given a contingent antibiotic prescription to fill and use as directed if symptoms progressed as discussed and agreed upon.      Current " Outpatient Prescriptions:   •  amoxicillin-clavulanate (AUGMENTIN) 875-125 MG Tab, Take 1 Tab by mouth 2 times a day for 7 days., Disp: 14 Tab, Rfl: 0  •  benzonatate (TESSALON) 200 MG capsule, Take 1 Cap by mouth 3 times a day as needed., Disp: 30 Cap, Rfl: 0     Differential diagnoses, Supportive care, and indications for immediate follow-up discussed with patient.   Instructed to return to clinic or nearest emergency department for any change in condition, further concerns, or worsening of symptoms.    The patient demonstrated a good understanding and agreed with the treatment plan.    Adrienne Dickson P.A.-C.

## 2018-12-19 NOTE — H&P
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  The records have been requested have not   been yet obtained.  She states that she has a recurrence of the vulvar   lesion.  She states that she is status post in-office excision of a vulvar   lesion in  and  and was told it was melanoma.  She was instructed to   follow up, but states she did not, and states that the lesion has recurred and   is larger.    PAST MEDICAL HISTORY:  1.  Migraine headache.  2.  Possible vulvar melanoma.    PAST SURGICAL HISTORY:   x2, vulvar wide local excision.    MEDICATIONS:  Ibuprofen, Lexapro, Pamprin, and allergy medications.    ALLERGIES:  None.    FAMILY HISTORY:  Noncontributory.    SOCIAL HISTORY:  The patient is a nonsmoker.  Denies alcohol abuse.    GYNECOLOGIC HISTORY:  The patient is  with a possible history of vulvar   melanoma.  She denies any history of abnormal Pap testing.    REVIEW OF SYSTEMS:  Positive for fatigue and night sweats, abdominal bloating   and constipation, fibrocystic breast changes, easy bruising, cold intolerance,   anxiety, and sleep disturbance.    PHYSICAL EXAMINATION:  VITAL SIGNS:  Height 5 feet 6 inches, weight 240 pounds, BMI is 39, blood   pressure 120/80, pulse of 78, oxygen saturation is 98% on room air.  GENERAL:  Well nourished, well developed, and moderately obese.  CARDIOVASCULAR:  RRR.  PULMONARY:  CTAB.  ABDOMEN:  Soft and nontender.  GENITOURINARY:  Large 1.5 cm pigmented nevus in the left vestibule at 4:00.    Vagina with normal mucosa.  EXTREMITIES:  Nontender, no edema.  A biopsy was performed in the office.  A 4   mm punch biopsy was performed in the usual fashion and resulted as squamous   dysplasia approaching squamous cell carcinoma in situ, negative for invasive   carcinoma.    ASSESSMENT AND PLAN:  A 33-year-old  with a possible reported history of   vulvar melanoma presents with a  recurrence of the vulvar lesion.  The patient   states she is status post in-office excision of the vulvar lesion in 2013 and   2014 and was told it was melanoma.  She was instructed to follow up, but   states she did not.  Now states the lesion has recurred and is larger.  Exam   with a 1-2 cm pigmented lesion on the left side of the vulva.  Punch biopsy   obtained resulted with severe squamous dysplasia approaching squamous cell   carcinoma in situ, but negative for invasive carcinoma.  The plan is for wide   local excision.  The patient was informed of the risks of surgery including   pain, bleeding, infection, change in architecture of the external vulva,   pelvic pain in the future, and sexual dysfunction.  She accepts all the risks   and agrees to the surgery as planned.       ____________________________________     MD CANDE Hines / NTS    DD:  12/19/2018 08:47:20  DT:  12/19/2018 09:18:15    D#:  [id number]  Job#:  [id number]

## 2018-12-20 ENCOUNTER — HOSPITAL ENCOUNTER (OUTPATIENT)
Facility: MEDICAL CENTER | Age: 33
End: 2018-12-20
Attending: OBSTETRICS & GYNECOLOGY | Admitting: OBSTETRICS & GYNECOLOGY
Payer: COMMERCIAL

## 2018-12-20 VITALS
SYSTOLIC BLOOD PRESSURE: 122 MMHG | HEIGHT: 67 IN | TEMPERATURE: 98 F | RESPIRATION RATE: 16 BRPM | OXYGEN SATURATION: 96 % | DIASTOLIC BLOOD PRESSURE: 77 MMHG | BODY MASS INDEX: 37.72 KG/M2 | HEART RATE: 69 BPM | WEIGHT: 240.3 LBS

## 2018-12-20 LAB
BASOPHILS # BLD AUTO: 0.2 % (ref 0–1.8)
BASOPHILS # BLD: 0.02 K/UL (ref 0–0.12)
EOSINOPHIL # BLD AUTO: 0.11 K/UL (ref 0–0.51)
EOSINOPHIL NFR BLD: 1.4 % (ref 0–6.9)
ERYTHROCYTE [DISTWIDTH] IN BLOOD BY AUTOMATED COUNT: 39.6 FL (ref 35.9–50)
HCG SERPL QL: NEGATIVE
HCT VFR BLD AUTO: 37.3 % (ref 37–47)
HGB BLD-MCNC: 12.3 G/DL (ref 12–16)
IMM GRANULOCYTES # BLD AUTO: 0.04 K/UL (ref 0–0.11)
IMM GRANULOCYTES NFR BLD AUTO: 0.5 % (ref 0–0.9)
LYMPHOCYTES # BLD AUTO: 1.86 K/UL (ref 1–4.8)
LYMPHOCYTES NFR BLD: 23.2 % (ref 22–41)
MCH RBC QN AUTO: 28.2 PG (ref 27–33)
MCHC RBC AUTO-ENTMCNC: 33 G/DL (ref 33.6–35)
MCV RBC AUTO: 85.6 FL (ref 81.4–97.8)
MONOCYTES # BLD AUTO: 0.46 K/UL (ref 0–0.85)
MONOCYTES NFR BLD AUTO: 5.7 % (ref 0–13.4)
NEUTROPHILS # BLD AUTO: 5.53 K/UL (ref 2–7.15)
NEUTROPHILS NFR BLD: 69 % (ref 44–72)
NRBC # BLD AUTO: 0 K/UL
NRBC BLD-RTO: 0 /100 WBC
PATHOLOGY CONSULT NOTE: NORMAL
PLATELET # BLD AUTO: 205 K/UL (ref 164–446)
PMV BLD AUTO: 9.7 FL (ref 9–12.9)
RBC # BLD AUTO: 4.36 M/UL (ref 4.2–5.4)
WBC # BLD AUTO: 8 K/UL (ref 4.8–10.8)

## 2018-12-20 PROCEDURE — 160002 HCHG RECOVERY MINUTES (STAT): Performed by: OBSTETRICS & GYNECOLOGY

## 2018-12-20 PROCEDURE — 700111 HCHG RX REV CODE 636 W/ 250 OVERRIDE (IP)

## 2018-12-20 PROCEDURE — 85025 COMPLETE CBC W/AUTO DIFF WBC: CPT

## 2018-12-20 PROCEDURE — 160035 HCHG PACU - 1ST 60 MINS PHASE I: Performed by: OBSTETRICS & GYNECOLOGY

## 2018-12-20 PROCEDURE — 501838 HCHG SUTURE GENERAL: Performed by: OBSTETRICS & GYNECOLOGY

## 2018-12-20 PROCEDURE — 160009 HCHG ANES TIME/MIN: Performed by: OBSTETRICS & GYNECOLOGY

## 2018-12-20 PROCEDURE — 160036 HCHG PACU - EA ADDL 30 MINS PHASE I: Performed by: OBSTETRICS & GYNECOLOGY

## 2018-12-20 PROCEDURE — A9270 NON-COVERED ITEM OR SERVICE: HCPCS

## 2018-12-20 PROCEDURE — 88309 TISSUE EXAM BY PATHOLOGIST: CPT

## 2018-12-20 PROCEDURE — 700102 HCHG RX REV CODE 250 W/ 637 OVERRIDE(OP)

## 2018-12-20 PROCEDURE — 84703 CHORIONIC GONADOTROPIN ASSAY: CPT

## 2018-12-20 PROCEDURE — 160027 HCHG SURGERY MINUTES - 1ST 30 MINS LEVEL 2: Performed by: OBSTETRICS & GYNECOLOGY

## 2018-12-20 PROCEDURE — 160025 RECOVERY II MINUTES (STATS): Performed by: OBSTETRICS & GYNECOLOGY

## 2018-12-20 PROCEDURE — 160048 HCHG OR STATISTICAL LEVEL 1-5: Performed by: OBSTETRICS & GYNECOLOGY

## 2018-12-20 PROCEDURE — 160038 HCHG SURGERY MINUTES - EA ADDL 1 MIN LEVEL 2: Performed by: OBSTETRICS & GYNECOLOGY

## 2018-12-20 PROCEDURE — 700101 HCHG RX REV CODE 250

## 2018-12-20 PROCEDURE — 160046 HCHG PACU - 1ST 60 MINS PHASE II: Performed by: OBSTETRICS & GYNECOLOGY

## 2018-12-20 RX ORDER — DIPHENHYDRAMINE HYDROCHLORIDE 50 MG/ML
12.5 INJECTION INTRAMUSCULAR; INTRAVENOUS
Status: DISCONTINUED | OUTPATIENT
Start: 2018-12-20 | End: 2018-12-20 | Stop reason: HOSPADM

## 2018-12-20 RX ORDER — OXYCODONE HYDROCHLORIDE AND ACETAMINOPHEN 5; 325 MG/1; MG/1
2 TABLET ORAL
Status: COMPLETED | OUTPATIENT
Start: 2018-12-20 | End: 2018-12-20

## 2018-12-20 RX ORDER — OFLOXACIN 3 MG/ML
10 SOLUTION AURICULAR (OTIC) 2 TIMES DAILY
Qty: 56 ML | Refills: 0 | Status: SHIPPED | OUTPATIENT
Start: 2018-12-20 | End: 2019-01-03

## 2018-12-20 RX ORDER — SODIUM CHLORIDE, SODIUM LACTATE, POTASSIUM CHLORIDE, CALCIUM CHLORIDE 600; 310; 30; 20 MG/100ML; MG/100ML; MG/100ML; MG/100ML
INJECTION, SOLUTION INTRAVENOUS ONCE
Status: COMPLETED | OUTPATIENT
Start: 2018-12-20 | End: 2018-12-20

## 2018-12-20 RX ORDER — HALOPERIDOL 5 MG/ML
1 INJECTION INTRAMUSCULAR
Status: DISCONTINUED | OUTPATIENT
Start: 2018-12-20 | End: 2018-12-20 | Stop reason: HOSPADM

## 2018-12-20 RX ORDER — ONDANSETRON 2 MG/ML
4 INJECTION INTRAMUSCULAR; INTRAVENOUS
Status: DISCONTINUED | OUTPATIENT
Start: 2018-12-20 | End: 2018-12-20 | Stop reason: HOSPADM

## 2018-12-20 RX ORDER — OXYCODONE HYDROCHLORIDE AND ACETAMINOPHEN 5; 325 MG/1; MG/1
1 TABLET ORAL
Status: COMPLETED | OUTPATIENT
Start: 2018-12-20 | End: 2018-12-20

## 2018-12-20 RX ORDER — BUPIVACAINE HYDROCHLORIDE AND EPINEPHRINE 5; 5 MG/ML; UG/ML
INJECTION, SOLUTION EPIDURAL; INTRACAUDAL; PERINEURAL
Status: DISCONTINUED | OUTPATIENT
Start: 2018-12-20 | End: 2018-12-20 | Stop reason: HOSPADM

## 2018-12-20 RX ADMIN — SODIUM CHLORIDE, SODIUM LACTATE, POTASSIUM CHLORIDE, CALCIUM CHLORIDE: 600; 310; 30; 20 INJECTION, SOLUTION INTRAVENOUS at 09:06

## 2018-12-20 RX ADMIN — OXYCODONE AND ACETAMINOPHEN 2 TABLET: 5; 325 TABLET ORAL at 11:12

## 2018-12-20 ASSESSMENT — PAIN SCALES - GENERAL
PAINLEVEL_OUTOF10: 3
PAINLEVEL_OUTOF10: 4
PAINLEVEL_OUTOF10: 5
PAINLEVEL_OUTOF10: 4
PAINLEVEL_OUTOF10: 5
PAINLEVEL_OUTOF10: 0
PAINLEVEL_OUTOF10: 3
PAINLEVEL_OUTOF10: 5

## 2018-12-20 NOTE — DISCHARGE INSTRUCTIONS
ACTIVITY: Rest and take it easy for the first 24 hours.  A responsible adult is recommended to remain with you during that time.  It is normal to feel sleepy.  We encourage you to not do anything that requires balance, judgment or coordination.    MILD FLU-LIKE SYMPTOMS ARE NORMAL. YOU MAY EXPERIENCE GENERALIZED MUSCLE ACHES, THROAT IRRITATION, HEADACHE AND/OR SOME NAUSEA.    FOR 24 HOURS DO NOT:  Drive, operate machinery or run household appliances.  Drink beer or alcoholic beverages.   Make important decisions or sign legal documents.    SPECIAL INSTRUCTIONS:   No heavy lifting for 1 week   Pelvic rest (no tampons, no douche, no intercourse) for 1 week.   Call Dr. Quijano office at [phone] for heavy vaginal bleeding or fever > 100.5 Degrees F    DIET: To avoid nausea, slowly advance diet as tolerated, avoiding spicy or greasy foods for the first day.  Add more substantial food to your diet according to your physician's instructions.  Babies can be fed formula or breast milk as soon as they are hungry.  INCREASE FLUIDS AND FIBER TO AVOID CONSTIPATION.    SURGICAL DRESSING/BATHING:  Ok to shower but no tub baths or hot tubs for one week.    FOLLOW-UP APPOINTMENT:  A follow-up appointment should be arranged with your doctor; call to schedule.    You should CALL YOUR PHYSICIAN if you develop:  Fever greater than 101 degrees F.  Pain not relieved by medication, or persistent nausea or vomiting.  Excessive bleeding (blood soaking through dressing) or unexpected drainage from the wound.  Extreme redness or swelling around the incision site, drainage of pus or foul smelling drainage.  Inability to urinate or empty your bladder within 8 hours.  Problems with breathing or chest pain.    You should call 911 if you develop problems with breathing or chest pain.  If you are unable to contact your doctor or surgical center, you should go to the nearest emergency room or urgent care center.  Physician's telephone #:  [phone]    If any questions arise, call your doctor.  If your doctor is not available, please feel free to call the Surgical Center at [phone].  The Center is open Monday through Friday from 7AM to 7PM.  You can also call the HEALTH HOTLINE open 24 hours/day, 7 days/week and speak to a nurse at [phone], or toll free at [phone].    A registered nurse may call you a few days after your surgery to see how you are doing after your procedure.    MEDICATIONS: Resume taking daily medication.  Take prescribed pain medication with food.  If no medication is prescribed, you may take non-aspirin pain medication if needed.  PAIN MEDICATION CAN BE VERY CONSTIPATING.  Take a stool softener or laxative such as senokot, pericolace, or milk of magnesia if needed.    Prescription given for OFLOXACIN.  Last pain medication given at 11:00.    If your physician has prescribed pain medication that includes Acetaminophen (Tylenol), do not take additional Acetaminophen (Tylenol) while taking the prescribed medication.    Depression / Suicide Risk    As you are discharged from this [facility] facility, it is important to learn how to keep safe from harming yourself.    Recognize the warning signs:  · Abrupt changes in personality, positive or negative- including increase in energy   · Giving away possessions  · Change in eating patterns- significant weight changes-  positive or negative  · Change in sleeping patterns- unable to sleep or sleeping all the time   · Unwillingness or inability to communicate  · Depression  · Unusual sadness, discouragement and loneliness  · Talk of wanting to die  · Neglect of personal appearance   · Rebelliousness- reckless behavior  · Withdrawal from people/activities they love  · Confusion- inability to concentrate     If you or a loved one observes any of these behaviors or has concerns about self-harm, here's what you can do:  · Talk about it- your feelings and reasons for  harming yourself  · Remove any means that you might use to hurt yourself (examples: pills, rope, extension cords, firearm)  · Get professional help from the community (Mental Health, Substance Abuse, psychological counseling)  · Do not be alone:Call your Safe Contact- someone whom you trust who will be there for you.  · Call your local CRISIS HOTLINE 837-1049 or 577-632-3399  · Call your local Children's Mobile Crisis Response Team [place] (531) 975-8262 or www.[place].com  · Call the toll free National Suicide Prevention Hotlines   · National Suicide Prevention Lifeline 639-236-OANV (4879)  · National Hope Line Network 800-SUICIDE (850-2994)

## 2018-12-20 NOTE — OR NURSING
1022-Received from OR via [employer]. S/P excision of vulva. Steri strips and benjamin pad in place. Awake but drowsy.  Bedside report received from RN. And Anesthesiologist.    1045-pt. Sitting up drinking water.  at bedside.    1115-Sipping on water. Percocet given for pain.    1200-sitting up, denies needs.    1231-meets discharge criteria. Iv removed. Instructions explained to   All questions answered. Discharged home with responsible party. Escorted to car via wheelchair.

## 2018-12-20 NOTE — OP REPORT
DATE OF SERVICE:  2018    PREOPERATIVE DIAGNOSIS:  Severe squamous dysplasia approaching squamous cell   carcinoma in situ.    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    PROCEDURE PERFORMED:  Wide local excision.    SURGEON:  Arya Quijano MD    ESTIMATED BLOOD LOSS:  Minimal.    ANESTHESIA TYPE:  General with ET tube.    ANESTHESIOLOGIST:  Suleiman Garcia MD    INDICATIONS:  A 33-year-old  with possible reported history of vulvar   melanoma, presents with recurrence of vulvar lesion.  Punch biopsies showed   severe squamous dysplasia approaching squamous cell carcinoma in situ.    FINDINGS:  A 1.5 cm pigmented lesion in the left vestibule.    PROCEDURE IN DETAIL:  The patient was taken to the operating room where her   general anesthesia was found to be adequate.  She was prepped and draped in   the normal sterile fashion and placed in the dorsal lithotomy position in   Jimi stirrups.  Using a marking pen, an elliptical of 0.5 cm margin was made   around the lesion and extending 3 times in length.  Using a 15 blade, the   elliptical was excised.  Hemostasis was obtained with Bovie cautery using   delayed absorbable suture.  Deep dermis sutures were placed in an interrupted   fashion and the skin was closed with 3-0 Vicryl.  A dressing was applied.         ____________________________________     Arya Quijano MD    [facility] / NTS    DD:  2018 10:30:27  DT:  2018 11:21:21    D#:  [id number]  Job#:  [id number]

## 2018-12-21 ENCOUNTER — OFFICE VISIT (OUTPATIENT)
Dept: URGENT CARE | Facility: PHYSICIAN GROUP | Age: 33
End: 2018-12-21
Payer: COMMERCIAL

## 2018-12-21 VITALS
DIASTOLIC BLOOD PRESSURE: 82 MMHG | WEIGHT: 245 LBS | BODY MASS INDEX: 38.45 KG/M2 | RESPIRATION RATE: 16 BRPM | HEART RATE: 85 BPM | OXYGEN SATURATION: 98 % | HEIGHT: 67 IN | SYSTOLIC BLOOD PRESSURE: 134 MMHG | TEMPERATURE: 98.5 F

## 2018-12-21 DIAGNOSIS — H60.503 ACUTE OTITIS EXTERNA OF BOTH EARS, UNSPECIFIED TYPE: ICD-10-CM

## 2018-12-21 PROCEDURE — 99214 OFFICE O/P EST MOD 30 MIN: CPT | Performed by: PHYSICIAN ASSISTANT

## 2018-12-21 RX ORDER — SULFAMETHOXAZOLE AND TRIMETHOPRIM 800; 160 MG/1; MG/1
1 TABLET ORAL 2 TIMES DAILY
Qty: 10 TAB | Refills: 0 | Status: SHIPPED | OUTPATIENT
Start: 2018-12-21 | End: 2018-12-26

## 2018-12-21 RX ORDER — CIPROFLOXACIN AND DEXAMETHASONE 3; 1 MG/ML; MG/ML
4 SUSPENSION/ DROPS AURICULAR (OTIC) 2 TIMES DAILY
Qty: 1 BOTTLE | Refills: 0 | Status: SHIPPED | OUTPATIENT
Start: 2018-12-21 | End: 2018-12-29

## 2018-12-21 ASSESSMENT — ENCOUNTER SYMPTOMS
HEADACHES: 0
SORE THROAT: 0
VOMITING: 0
RHINORRHEA: 0
FEVER: 0

## 2018-12-21 NOTE — PROGRESS NOTES
Subjective:   Catherine Porter is a 33 y.o. female who presents for Otalgia (bilateral ear pain x 1 week )        Patient states that she gets otitis externa annually.  She was treated for this in urgent care and states that she was placed on 2 antibiotics.  Symptoms fully resolved.      Otalgia    There is pain in both ears. This is a new problem. The current episode started in the past 7 days. The problem occurs constantly. The problem has been gradually worsening. There has been no fever. The pain is moderate. Associated symptoms include ear discharge. Pertinent negatives include no headaches, hearing loss, rhinorrhea, sore throat or vomiting. She has tried nothing for the symptoms. There is no history of a chronic ear infection, hearing loss or a tympanostomy tube.     Review of Systems   Constitutional: Negative for fever.   HENT: Positive for ear discharge and ear pain. Negative for hearing loss, rhinorrhea and sore throat.    Gastrointestinal: Negative for vomiting.   Neurological: Negative for headaches.       PMH:  has a past medical history of Hyperlipidemia LDL goal < 130 (7/30/2015). She also has no past medical history of Clotting disorder (HCC).  MEDS:   Current Outpatient Prescriptions:   •  sulfamethoxazole-trimethoprim (BACTRIM DS) 800-160 MG tablet, Take 1 Tab by mouth 2 times a day for 5 days., Disp: 10 Tab, Rfl: 0  •  ciprofloxacin/dexamethasone (CIPRODEX) 0.3-0.1 % Suspension, Place 4 Drops in ear 2 times a day., Disp: 1 Bottle, Rfl: 0  •  escitalopram (LEXAPRO) 10 MG Tab, Take 1 Tab by mouth every day., Disp: 90 Tab, Rfl: 1  •  ofloxacin otic sol (FLOXIN OTIC) 0.3 % Solution, Place 10 Drops in left ear 2 times a day for 14 days., Disp: 56 mL, Rfl: 0  •  IBUPROFEN PO, Take  by mouth., Disp: , Rfl:   •  fexofenadine (ALLEGRA) 180 MG tablet, Take 1 Tab by mouth every day., Disp: 30 Tab, Rfl: 3  •  hydrOXYzine (ATARAX) 25 MG TABS, Take 1 Tab by mouth 3 times a day as needed for Anxiety.,  "Disp: 30 Tab, Rfl: 3  ALLERGIES: No Known Allergies  SURGHX:   Past Surgical History:   Procedure Laterality Date   • WIDE EXCISION  12/20/2018    Procedure: WIDE EXCISION - LOCAL EXCISION OF VULVA;  Surgeon: Arya Quijano M.D.;  Location: SURGERY SAME DAY [facility];  Service: Gynecology   • REPEAT C SECTION  12/23/2013    Performed by Dony Bales M.D. at LABOR AND DELIVERY   • GYN SURGERY  primary c section    2010     SOCHX:  reports that she has never smoked. She has never used smokeless tobacco. She reports that she drinks alcohol. She reports that she does not use drugs.  FH: Family history was reviewed, no pertinent findings to report   Objective:   /82   Pulse 85   Temp 36.9 °C (98.5 °F) (Temporal)   Resp 16   Ht 1.702 m (5' 7\")   Wt 111.1 kg (245 lb)   LMP 12/12/2018   SpO2 98%   BMI 38.37 kg/m²   Physical Exam   Constitutional: She appears well-developed and well-nourished.   HENT:   Head: Normocephalic and atraumatic.   Right Ear: There is drainage and swelling. No mastoid tenderness. No decreased hearing is noted.   Left Ear: There is drainage and swelling. No mastoid tenderness. Decreased hearing is noted.   Significant purulent discharge in EACs bilaterally.  Right side is worse.    Subjective mild left-sided hearing loss.    Unable to assess TMs due to amount of discharge.   Neck: Neck supple.   Pulmonary/Chest: Effort normal. No respiratory distress.   Neurological: She is alert.   Skin: Skin is warm and dry.   Psychiatric: She has a normal mood and affect. Her behavior is normal. Thought content normal.   Vitals reviewed.        Assessment/Plan:   1. Acute otitis externa of both ears, unspecified type  - sulfamethoxazole-trimethoprim (BACTRIM DS) 800-160 MG tablet; Take 1 Tab by mouth 2 times a day for 5 days.  Dispense: 10 Tab; Refill: 0  - ciprofloxacin/dexamethasone (CIPRODEX) 0.3-0.1 % Suspension; Place 4 Drops in ear 2 times a day.  Dispense: 1 Bottle; Refill: " 0    Patient's records from 8.28.17.  She was evaluated and treated for otitis externa.  She was treated with Ciprodex and Bactrim.    Physical exam is consistent with acute otitis externa.  No mastoid tenderness and patient is afebrile.  Patient states that the aforementioned medications worked well for patient last time I will do a slightly shorter course of Bactrim as well as the Ciprodex.    Patient instructed to gently clean ears with gauze but not to penetrate deeply into the canal.  Patient's symptoms have dramatically improved in the next 3-5 days.  If patient's symptoms worsen I would like her to return to clinic for reevaluation.  If patient's symptoms fail to fully resolve I would also like her to return to clinic.    Differential diagnosis, natural history, supportive care, and indications for immediate follow-up discussed.

## 2018-12-29 ENCOUNTER — HOSPITAL ENCOUNTER (OUTPATIENT)
Dept: LAB | Facility: MEDICAL CENTER | Age: 33
End: 2018-12-29
Attending: STUDENT IN AN ORGANIZED HEALTH CARE EDUCATION/TRAINING PROGRAM
Payer: COMMERCIAL

## 2018-12-29 ENCOUNTER — OFFICE VISIT (OUTPATIENT)
Dept: URGENT CARE | Facility: PHYSICIAN GROUP | Age: 33
End: 2018-12-29
Payer: COMMERCIAL

## 2018-12-29 VITALS
SYSTOLIC BLOOD PRESSURE: 100 MMHG | HEART RATE: 88 BPM | DIASTOLIC BLOOD PRESSURE: 76 MMHG | OXYGEN SATURATION: 97 % | HEIGHT: 67 IN | TEMPERATURE: 98.2 F | BODY MASS INDEX: 38.27 KG/M2 | WEIGHT: 243.8 LBS

## 2018-12-29 DIAGNOSIS — E66.9 OBESITY (BMI 30-39.9): ICD-10-CM

## 2018-12-29 DIAGNOSIS — H60.501 ACUTE OTITIS EXTERNA OF RIGHT EAR, UNSPECIFIED TYPE: ICD-10-CM

## 2018-12-29 DIAGNOSIS — Z00.00 HEALTH MAINTENANCE EXAMINATION: ICD-10-CM

## 2018-12-29 LAB
25(OH)D3 SERPL-MCNC: 10 NG/ML (ref 30–100)
ALBUMIN SERPL BCP-MCNC: 4 G/DL (ref 3.2–4.9)
ALBUMIN/GLOB SERPL: 1 G/DL
ALP SERPL-CCNC: 62 U/L (ref 30–99)
ALT SERPL-CCNC: 23 U/L (ref 2–50)
ANION GAP SERPL CALC-SCNC: 7 MMOL/L (ref 0–11.9)
AST SERPL-CCNC: 21 U/L (ref 12–45)
BASOPHILS # BLD AUTO: 0.4 % (ref 0–1.8)
BASOPHILS # BLD: 0.03 K/UL (ref 0–0.12)
BILIRUB SERPL-MCNC: 0.4 MG/DL (ref 0.1–1.5)
BUN SERPL-MCNC: 9 MG/DL (ref 8–22)
CALCIUM SERPL-MCNC: 9.9 MG/DL (ref 8.5–10.5)
CHLORIDE SERPL-SCNC: 102 MMOL/L (ref 96–112)
CHOLEST SERPL-MCNC: 234 MG/DL (ref 100–199)
CO2 SERPL-SCNC: 28 MMOL/L (ref 20–33)
CREAT SERPL-MCNC: 0.46 MG/DL (ref 0.5–1.4)
EOSINOPHIL # BLD AUTO: 0.1 K/UL (ref 0–0.51)
EOSINOPHIL NFR BLD: 1.4 % (ref 0–6.9)
ERYTHROCYTE [DISTWIDTH] IN BLOOD BY AUTOMATED COUNT: 39 FL (ref 35.9–50)
EST. AVERAGE GLUCOSE BLD GHB EST-MCNC: 120 MG/DL
FASTING STATUS PATIENT QL REPORTED: NORMAL
GLOBULIN SER CALC-MCNC: 3.9 G/DL (ref 1.9–3.5)
GLUCOSE SERPL-MCNC: 88 MG/DL (ref 65–99)
HBA1C MFR BLD: 5.8 % (ref 0–5.6)
HCT VFR BLD AUTO: 42.1 % (ref 37–47)
HDLC SERPL-MCNC: 49 MG/DL
HGB BLD-MCNC: 13.6 G/DL (ref 12–16)
IMM GRANULOCYTES # BLD AUTO: 0.02 K/UL (ref 0–0.11)
IMM GRANULOCYTES NFR BLD AUTO: 0.3 % (ref 0–0.9)
LDLC SERPL CALC-MCNC: 152 MG/DL
LYMPHOCYTES # BLD AUTO: 2.16 K/UL (ref 1–4.8)
LYMPHOCYTES NFR BLD: 30 % (ref 22–41)
MCH RBC QN AUTO: 28 PG (ref 27–33)
MCHC RBC AUTO-ENTMCNC: 32.3 G/DL (ref 33.6–35)
MCV RBC AUTO: 86.6 FL (ref 81.4–97.8)
MONOCYTES # BLD AUTO: 0.4 K/UL (ref 0–0.85)
MONOCYTES NFR BLD AUTO: 5.5 % (ref 0–13.4)
NEUTROPHILS # BLD AUTO: 4.5 K/UL (ref 2–7.15)
NEUTROPHILS NFR BLD: 62.4 % (ref 44–72)
NRBC # BLD AUTO: 0 K/UL
NRBC BLD-RTO: 0 /100 WBC
PLATELET # BLD AUTO: 258 K/UL (ref 164–446)
PMV BLD AUTO: 9.8 FL (ref 9–12.9)
POTASSIUM SERPL-SCNC: 3.9 MMOL/L (ref 3.6–5.5)
PROT SERPL-MCNC: 7.9 G/DL (ref 6–8.2)
RBC # BLD AUTO: 4.86 M/UL (ref 4.2–5.4)
SODIUM SERPL-SCNC: 137 MMOL/L (ref 135–145)
TRIGL SERPL-MCNC: 164 MG/DL (ref 0–149)
TSH SERPL DL<=0.005 MIU/L-ACNC: 1.25 UIU/ML (ref 0.38–5.33)
WBC # BLD AUTO: 7.2 K/UL (ref 4.8–10.8)

## 2018-12-29 PROCEDURE — 85025 COMPLETE CBC W/AUTO DIFF WBC: CPT

## 2018-12-29 PROCEDURE — 82306 VITAMIN D 25 HYDROXY: CPT

## 2018-12-29 PROCEDURE — 83036 HEMOGLOBIN GLYCOSYLATED A1C: CPT

## 2018-12-29 PROCEDURE — 36415 COLL VENOUS BLD VENIPUNCTURE: CPT

## 2018-12-29 PROCEDURE — 84443 ASSAY THYROID STIM HORMONE: CPT

## 2018-12-29 PROCEDURE — 80053 COMPREHEN METABOLIC PANEL: CPT

## 2018-12-29 PROCEDURE — 80061 LIPID PANEL: CPT

## 2018-12-29 PROCEDURE — 99213 OFFICE O/P EST LOW 20 MIN: CPT | Performed by: INTERNAL MEDICINE

## 2018-12-29 RX ORDER — HYDROCORTISONE AND ACETIC ACID 20.75; 10.375 MG/ML; MG/ML
2 SOLUTION AURICULAR (OTIC) 2 TIMES DAILY
Qty: 1 BOTTLE | Refills: 0 | Status: SHIPPED | OUTPATIENT
Start: 2018-12-29 | End: 2019-01-05

## 2018-12-29 ASSESSMENT — ENCOUNTER SYMPTOMS
NAUSEA: 0
DIARRHEA: 0
EYE DISCHARGE: 0
EYE PAIN: 0
VOMITING: 0
CHILLS: 0
MYALGIAS: 0
FEVER: 0
HEADACHES: 0
ABDOMINAL PAIN: 0
SINUS PAIN: 0
SORE THROAT: 0
CONSTIPATION: 0
SHORTNESS OF BREATH: 0

## 2018-12-29 NOTE — PROGRESS NOTES
Subjective:   Catherine Porter is a 33 y.o. female who presents for Earache (pt states that her infectcion has not gone, head congestion, R ear still minor pain, x1 week)       Patient presents today with right ear pain for about 1 week. Patient was seen last week for otitis externa of left ear and was given Cipro drops as well as Bactrim. She states that she used both as prescribed and started using Cipro drops in right ear when she developed symptoms. She states that her left ear feels good, but her right ear still feels full. She denies fever, chills, ear discharge. She is still experiencing congestion, but tried previously prescribed fluticasone nasal spray this morning and she states that it has helped a lot.       Review of Systems   Constitutional: Negative for chills and fever.   HENT: Positive for congestion and ear pain. Negative for ear discharge, sinus pain and sore throat.    Eyes: Negative for pain and discharge.   Respiratory: Negative for shortness of breath.    Cardiovascular: Negative for chest pain.   Gastrointestinal: Negative for abdominal pain, constipation, diarrhea, nausea and vomiting.   Musculoskeletal: Negative for myalgias.   Neurological: Negative for headaches.       PMH:  has a past medical history of Hyperlipidemia LDL goal < 130 (7/30/2015). She also has no past medical history of Clotting disorder (HCC).    MEDS:   Current Outpatient Prescriptions:   •  fluticasone-salmeterol (ADVAIR) 100-50 MCG/DOSE AEROSOL POWDER, BREATH ACTIVATED, Inhale 1 Puff by mouth every 12 hours., Disp: , Rfl:   •  acetic acid-hydrocortisone (VOSOL-HC) 1-2 % Solution, Place 2 Drops in right ear 2 times a day for 7 days., Disp: 1 Bottle, Rfl: 0  •  ofloxacin otic sol (FLOXIN OTIC) 0.3 % Solution, Place 10 Drops in left ear 2 times a day for 14 days., Disp: 56 mL, Rfl: 0  •  IBUPROFEN PO, Take  by mouth., Disp: , Rfl:   •  escitalopram (LEXAPRO) 10 MG Tab, Take 1 Tab by mouth every day., Disp: 90  "Tab, Rfl: 1  •  fexofenadine (ALLEGRA) 180 MG tablet, Take 1 Tab by mouth every day., Disp: 30 Tab, Rfl: 3  •  hydrOXYzine (ATARAX) 25 MG TABS, Take 1 Tab by mouth 3 times a day as needed for Anxiety., Disp: 30 Tab, Rfl: 3    ALLERGIES: No Known Allergies    SURGHX:   Past Surgical History:   Procedure Laterality Date   • WIDE EXCISION  12/20/2018    Procedure: WIDE EXCISION - LOCAL EXCISION OF VULVA;  Surgeon: Arya Quijano M.D.;  Location: SURGERY SAME DAY [facility];  Service: Gynecology   • REPEAT C SECTION  12/23/2013    Performed by Dony Bales M.D. at LABOR AND DELIVERY   • GYN SURGERY  primary c section    2010       SOCHX:  reports that she has never smoked. She has never used smokeless tobacco. She reports that she drinks alcohol. She reports that she does not use drugs.    FH: Reviewed with patient, not pertinent to this visit.     Objective:   /76 (BP Location: Left arm, Patient Position: Sitting, BP Cuff Size: Large adult)   Pulse 88   Temp 36.8 °C (98.2 °F) (Temporal)   Ht 1.702 m (5' 7\")   Wt 110.6 kg (243 lb 12.8 oz)   LMP 12/12/2018   SpO2 97%   BMI 38.18 kg/m²   Physical Exam   Constitutional: She is oriented to person, place, and time. She appears well-developed and well-nourished. No distress.   HENT:   Head: Normocephalic and atraumatic.   Right Ear: Tympanic membrane and external ear normal. There is drainage and swelling.   Left Ear: Tympanic membrane, external ear and ear canal normal.   Nose: Nose normal. Right sinus exhibits no maxillary sinus tenderness and no frontal sinus tenderness. Left sinus exhibits no maxillary sinus tenderness and no frontal sinus tenderness.   Eyes: Conjunctivae and EOM are normal.   Pulmonary/Chest: Effort normal. No respiratory distress.   Neurological: She is alert and oriented to person, place, and time.   Skin: Skin is warm and dry.   Psychiatric: She has a normal mood and affect. Her behavior is normal. Judgment and thought content " normal.       Assessment/Plan:   1. Acute otitis externa of right ear, unspecified type  - acetic acid-hydrocortisone (VOSOL-HC) 1-2 % Solution; Place 2 Drops in right ear 2 times a day for 7 days.  Dispense: 1 Bottle; Refill: 0    Other orders  - fluticasone-salmeterol (ADVAIR) 100-50 MCG/DOSE AEROSOL POWDER, BREATH ACTIVATED; Inhale 1 Puff by mouth every 12 hours.  - Advised to continue fluticasone nasal spray for congestion relief  - Advised to return if symptoms worsen or do not improve    Differential diagnosis, natural history, supportive care, and indications for immediate follow-up discussed.    Case and results reviewed and agree with treatment plan as outlined.  Dr. Merritt

## 2019-02-06 ENCOUNTER — OFFICE VISIT (OUTPATIENT)
Dept: INTERNAL MEDICINE | Facility: MEDICAL CENTER | Age: 34
End: 2019-02-06
Payer: COMMERCIAL

## 2019-02-06 VITALS
HEART RATE: 94 BPM | OXYGEN SATURATION: 99 % | SYSTOLIC BLOOD PRESSURE: 124 MMHG | DIASTOLIC BLOOD PRESSURE: 60 MMHG | BODY MASS INDEX: 39.24 KG/M2 | HEIGHT: 67 IN | WEIGHT: 250 LBS | TEMPERATURE: 98.3 F

## 2019-02-06 DIAGNOSIS — E78.5 DYSLIPIDEMIA: ICD-10-CM

## 2019-02-06 DIAGNOSIS — F41.9 ANXIETY: ICD-10-CM

## 2019-02-06 DIAGNOSIS — E55.9 VITAMIN D DEFICIENCY: ICD-10-CM

## 2019-02-06 DIAGNOSIS — R73.03 PREDIABETES: ICD-10-CM

## 2019-02-06 PROCEDURE — 99213 OFFICE O/P EST LOW 20 MIN: CPT | Mod: GE | Performed by: INTERNAL MEDICINE

## 2019-02-06 RX ORDER — HYDROXYZINE HYDROCHLORIDE 25 MG/1
25 TABLET, FILM COATED ORAL
Qty: 30 TAB | Refills: 2 | Status: SHIPPED | OUTPATIENT
Start: 2019-02-06 | End: 2022-07-20

## 2019-02-06 ASSESSMENT — PATIENT HEALTH QUESTIONNAIRE - PHQ9
CLINICAL INTERPRETATION OF PHQ2 SCORE: 2
SUM OF ALL RESPONSES TO PHQ QUESTIONS 1-9: 3
5. POOR APPETITE OR OVEREATING: 0 - NOT AT ALL

## 2019-02-06 ASSESSMENT — PAIN SCALES - GENERAL: PAINLEVEL: NO PAIN

## 2019-02-06 NOTE — ASSESSMENT & PLAN NOTE
-Vitamin D level 10 in December 2018.  -Patient does not get enough sunlight exposure.  -Discussed with the patient treatment, patient would like to sign up for [employer] vitamin D research.  -Discussed the constant with the patient, consent signed, patient was given the vitamin D pills and other instructions.  -Follow-up in 3 months.

## 2019-02-06 NOTE — ASSESSMENT & PLAN NOTE
-Hemoglobin A1c 5.8 in December 2018.  -Discussed with the patient weight loss, eating healthy and lifestyle modification.  -Discussed referral to health improvement program, patient is not interested at this time she will try lifestyle modification.  -Recheck hemoglobin A1c in 3 months.  -Follow-up in 3 months.

## 2019-02-06 NOTE — PROGRESS NOTES
Established Patient    Ginger presents today with the following:    CC: Follow-up anxiety, prediabetes, dyslipidemia and vitamin D deficiency.    HPI: Catherine Porter is a 33 y.o. Female with medical history of anxiety and vulvar lesion presented to the clinic today for follow-up.    Anxiety: Diagnosed in 2015, patient had difficult situations while she was passing through a divorce, her PCP tried multiple medications and the patient responded well to Lexapro 10 mg daily with when necessary Atarax, she was not compliant with her medications, misses 2-3 doses per week. KHOA score was 21.  Patient was instructed to be compliant with her medication, currently she is compliant, reports significant improvement, her KHOA 7 score was 8 today, her anxiety is not affecting her life quality anymore, she uses the hydroxyzine as needed maybe once every couple of months, she still uses the prescription from 2015 which she needs a refill on.    Prediabetes: Lab work done in December 2018 showed a hemoglobin A1c of 5.8, denies weight loss, polyuria, polyphagia and polydipsia.    Abnormal lipid profile: Fasting lipid profile done in December 2018 showed a total cholesterol of 234, triglycerides 164, HDL 49 and LDL of 152.    VIt D deficiency: Lab work done in December 2018 showed a vitamin D level of 10, patient spent most of the day indoor does not get enough sunlight exposure.    Patient Active Problem List    Diagnosis Date Noted   • Vulval lesion 11/06/2018   • Obesity (BMI 30-39.9) 08/21/2018   • Health maintenance examination 08/21/2018   • Sleep disorder breathing 03/16/2017   • PATRICIA positive for HSV 09/16/2016   • Vitamin D deficiency 08/27/2015   • Tension headache 08/27/2015   • Nausea 08/27/2015   • Hyperlipidemia with target LDL less than 130 07/30/2015   • Anxiety 07/16/2015   • Family history of diabetes mellitus 06/30/2015   • Obesity 06/30/2015       Current Outpatient Prescriptions   Medication Sig Dispense  "Refill   • hydrOXYzine HCl (ATARAX) 25 MG Tab Take 1 Tab by mouth 1 time daily as needed for Anxiety. 30 Tab 2   • escitalopram (LEXAPRO) 10 MG Tab Take 1 Tab by mouth every day. 90 Tab 1   • fexofenadine (ALLEGRA) 180 MG tablet Take 1 Tab by mouth every day. 30 Tab 3   • fluticasone-salmeterol (ADVAIR) 100-50 MCG/DOSE AEROSOL POWDER, BREATH ACTIVATED Inhale 1 Puff by mouth every 12 hours.     • IBUPROFEN PO Take  by mouth.       No current facility-administered medications for this visit.        ROS: As per HPI. Additional pertinent systems as noted below.  Constitutional: Negative for chills and fever.   HENT: Negative for ear pain and sore throat.    Eyes: Negative for discharge and redness.   Respiratory: Negative for cough, hemoptysis, wheezing and stridor.    Cardiovascular: Negative for chest pain, palpitations and leg swelling.   Gastrointestinal: Negative for abdominal pain, constipation, diarrhea, heartburn, nausea and vomiting.   Genitourinary: Negative for dysuria, flank pain and hematuria.   Musculoskeletal: Negative for falls and myalgias.   Skin: Negative for itching and rash.   Neurological: Negative for dizziness, seizures, loss of consciousness and headaches.   Endo/Heme/Allergies: Negative for polydipsia. Does not bruise/bleed easily.   Psychiatric/Behavioral: Negative for substance abuse and suicidal ideas.       /60 (BP Location: Right arm, Patient Position: Sitting, BP Cuff Size: Adult)   Pulse 94   Temp 36.8 °C (98.3 °F) (Temporal)   Ht 1.702 m (5' 7\")   Wt 113.4 kg (250 lb)   LMP 01/23/2019 (Within Days)   SpO2 99%   Breastfeeding? No   BMI 39.16 kg/m²     Physical Exam   Constitutional:  oriented to person, place, and time. No distress.   Eyes: Pupils are equal, round, and reactive to light. No scleral icterus.  Neck: Neck supple. No thyromegaly present.   Cardiovascular: Normal rate, regular rhythm and normal heart sounds.  Exam reveals no gallop and no friction rub.  No " murmur heard.  Pulmonary/Chest: Breath sounds normal. Chest wall is not dull to percussion.   Musculoskeletal:   no edema.   Lymphadenopathy: no cervical adenopathy  Neurological: alert and oriented to person, place, and time.   Skin: No cyanosis. Nails show no clubbing.      Note: I have reviewed all pertinent labs and diagnostic tests associated with this visit with specific comments listed under the assessment and plan below    Assessment and Plan    Anxiety  - Diagnosed in 2015.  - tried multiple medications and the patient responded well to Lexapro 10 mg daily with when necessary Atarax.  - was not compliant with her medications, misses 2-3 doses per week.  -Currently compliant.  -  KHOA 7 score went down from 21 to 8.   - TSH within normal done in December 2018.    - Counseled patient on medication compliance.  -Continue Lexapro 10 mg daily.  -Place referral to psychotherapy for cognitive behavioral therapy.  -Refill hydroxyzine.    Prediabetes  -Hemoglobin A1c 5.8 in December 2018.  -Discussed with the patient weight loss, eating healthy and lifestyle modification.  -Discussed referral to health improvement program, patient is not interested at this time she will try lifestyle modification.  -Recheck hemoglobin A1c in 3 months.  -Follow-up in 3 months.    Dyslipidemia  -  Fasting lipid profile done in December 2018 showed a total cholesterol of 234, triglycerides 164, HDL 49 and LDL of 152.  -Discussed with the patient weight loss, eating healthy and lifestyle modification.  -Discussed referral to health improvement program, patient is not interested at this time she will try lifestyle modification.        Vitamin D deficiency  -Vitamin D level 10 in December 2018.  -Patient does not get enough sunlight exposure.  -Discussed with the patient treatment, patient would like to sign up for [employer] vitamin D research.  -Discussed the constant with the patient, consent signed, patient was given the vitamin D pills and  other instructions.  -Follow-up in 3 months.      Followup: Return in about 3 months (around 5/6/2019).      Signed by: Ramirez Schultz M.D.

## 2019-02-06 NOTE — PATIENT INSTRUCTIONS
- please do the lab work in 3 months   - please get the UV radiation therapy as scheduled     Panic Attacks  Panic attacks are sudden, short feelings of great fear or discomfort. You may have them for no reason when you are relaxed, when you are uneasy (anxious), or when you are sleeping.  Follow these instructions at home:  · Take all your medicines as told.  · Check with your doctor before starting new medicines.  · Keep all doctor visits.  Contact a doctor if:  · You are not able to take your medicines as told.  · Your symptoms do not get better.  · Your symptoms get worse.  Get help right away if:  · Your attacks seem different than your normal attacks.  · You have thoughts about hurting yourself or others.  · You take panic attack medicine and you have a side effect.  This information is not intended to replace advice given to you by your health care provider. Make sure you discuss any questions you have with your health care provider.  Document Released: 01/20/2012 Document Revised: 05/25/2017 Document Reviewed: 08/01/2014  [employer] Interactive Patient Education © 2017 [employer]

## 2019-02-06 NOTE — ASSESSMENT & PLAN NOTE
- Diagnosed in 2015.  - tried multiple medications and the patient responded well to Lexapro 10 mg daily with when necessary Atarax.  - was not compliant with her medications, misses 2-3 doses per week.  -Currently compliant.  -  KHOA 7 score went down from 21 to 8.   - TSH within normal done in December 2018.    - Counseled patient on medication compliance.  -Continue Lexapro 10 mg daily.  -Place referral to psychotherapy for cognitive behavioral therapy.  -Refill hydroxyzine.

## 2019-02-06 NOTE — ASSESSMENT & PLAN NOTE
-  Fasting lipid profile done in December 2018 showed a total cholesterol of 234, triglycerides 164, HDL 49 and LDL of 152.  -Discussed with the patient weight loss, eating healthy and lifestyle modification.  -Discussed referral to health improvement program, patient is not interested at this time she will try lifestyle modification.

## 2019-03-01 ENCOUNTER — TELEPHONE (OUTPATIENT)
Dept: INTERNAL MEDICINE | Facility: MEDICAL CENTER | Age: 34
End: 2019-03-01

## 2019-03-01 NOTE — TELEPHONE ENCOUNTER
----- Message from Ramirez Schultz M.D. sent at 3/1/2019  2:44 PM PST -----  Please call patient instruct her to call [facility] in dermatology office at [phone] to schedule the photo therapy next week Tuesday or Thursday.

## 2019-03-01 NOTE — TELEPHONE ENCOUNTER
----- Message from Ramirez Schultz M.D. sent at 3/1/2019  2:44 PM PST -----  Please call patient instruct her to call [facility] in dermatology office at [phone] to schedule the photo therapy next week Tuesday or Thursday.

## 2019-03-12 NOTE — TELEPHONE ENCOUNTER
Called pt did not answer LM asking her to please call us back as we have been trying to contact her for a while.

## 2019-03-14 NOTE — TELEPHONE ENCOUNTER
Closing encounter have tried 3 x's to reach pt with no success , my chart message has not been read

## 2019-05-30 ENCOUNTER — APPOINTMENT (OUTPATIENT)
Dept: INTERNAL MEDICINE | Facility: MEDICAL CENTER | Age: 34
End: 2019-05-30
Payer: COMMERCIAL

## 2019-11-23 ENCOUNTER — HOSPITAL ENCOUNTER (OUTPATIENT)
Dept: LAB | Facility: MEDICAL CENTER | Age: 34
End: 2019-11-23
Attending: STUDENT IN AN ORGANIZED HEALTH CARE EDUCATION/TRAINING PROGRAM
Payer: COMMERCIAL

## 2019-11-23 LAB
25(OH)D3 SERPL-MCNC: 22 NG/ML (ref 30–100)
EST. AVERAGE GLUCOSE BLD GHB EST-MCNC: 123 MG/DL
HBA1C MFR BLD: 5.9 % (ref 0–5.6)

## 2019-11-23 PROCEDURE — 82306 VITAMIN D 25 HYDROXY: CPT

## 2019-11-23 PROCEDURE — 36415 COLL VENOUS BLD VENIPUNCTURE: CPT

## 2019-11-23 PROCEDURE — 83036 HEMOGLOBIN GLYCOSYLATED A1C: CPT

## 2020-02-17 ENCOUNTER — OFFICE VISIT (OUTPATIENT)
Dept: URGENT CARE | Facility: PHYSICIAN GROUP | Age: 35
End: 2020-02-17
Payer: COMMERCIAL

## 2020-02-17 VITALS
SYSTOLIC BLOOD PRESSURE: 128 MMHG | BODY MASS INDEX: 38.45 KG/M2 | TEMPERATURE: 98.9 F | WEIGHT: 245 LBS | DIASTOLIC BLOOD PRESSURE: 74 MMHG | HEIGHT: 67 IN | HEART RATE: 68 BPM

## 2020-02-17 DIAGNOSIS — H60.312 ACUTE DIFFUSE OTITIS EXTERNA OF LEFT EAR: ICD-10-CM

## 2020-02-17 PROCEDURE — 99214 OFFICE O/P EST MOD 30 MIN: CPT | Performed by: PHYSICIAN ASSISTANT

## 2020-02-17 RX ORDER — AMOXICILLIN 500 MG/1
500 CAPSULE ORAL 3 TIMES DAILY
Qty: 21 CAP | Refills: 0 | Status: SHIPPED | OUTPATIENT
Start: 2020-02-17 | End: 2020-02-24

## 2020-02-17 RX ORDER — OFLOXACIN 3 MG/ML
5 SOLUTION AURICULAR (OTIC) DAILY
Qty: 10 ML | Refills: 0 | Status: SHIPPED | OUTPATIENT
Start: 2020-02-17 | End: 2020-02-24

## 2020-02-17 ASSESSMENT — ENCOUNTER SYMPTOMS
SORE THROAT: 0
CHILLS: 0
FEVER: 0

## 2020-02-18 NOTE — PROGRESS NOTES
Subjective:   Catherine Porter is a 34 y.o. female who presents today with   Chief Complaint   Patient presents with   • Otalgia     Left ear pain ongoing 1 day       Otalgia    There is pain in the left ear. This is a new problem. The current episode started yesterday. The problem occurs constantly. The problem has been gradually worsening. The pain is mild. Associated symptoms include ear discharge. Pertinent negatives include no sore throat. She has tried ear drops and acetaminophen for the symptoms. The treatment provided no relief.     PMH:  has a past medical history of Hyperlipidemia LDL goal < 130 (7/30/2015). She also has no past medical history of Clotting disorder (HCC).  MEDS:   Current Outpatient Medications:   •  amoxicillin (AMOXIL) 500 MG Cap, Take 1 Cap by mouth 3 times a day for 7 days., Disp: 21 Cap, Rfl: 0  •  ofloxacin otic sol (FLOXIN OTIC) 0.3 % Solution, Place 5 Drops in left ear every day for 7 days., Disp: 10 mL, Rfl: 0  •  hydrOXYzine HCl (ATARAX) 25 MG Tab, Take 1 Tab by mouth 1 time daily as needed for Anxiety., Disp: 30 Tab, Rfl: 2  •  fluticasone-salmeterol (ADVAIR) 100-50 MCG/DOSE AEROSOL POWDER, BREATH ACTIVATED, Inhale 1 Puff by mouth every 12 hours., Disp: , Rfl:   •  IBUPROFEN PO, Take  by mouth., Disp: , Rfl:   •  escitalopram (LEXAPRO) 10 MG Tab, Take 1 Tab by mouth every day., Disp: 90 Tab, Rfl: 1  •  fexofenadine (ALLEGRA) 180 MG tablet, Take 1 Tab by mouth every day., Disp: 30 Tab, Rfl: 3  ALLERGIES: No Known Allergies  SURGHX:   Past Surgical History:   Procedure Laterality Date   • WIDE EXCISION  12/20/2018    Procedure: WIDE EXCISION - LOCAL EXCISION OF VULVA;  Surgeon: Arya Quijano M.D.;  Location: SURGERY SAME DAY [facility];  Service: Gynecology   • REPEAT C SECTION  12/23/2013    Performed by Dony Bales M.D. at LABOR AND DELIVERY   • GYN SURGERY  primary c section    2010     SOCHX:  reports that she has never smoked. She has never used smokeless  "tobacco. She reports current alcohol use. She reports that she does not use drugs.  FH: Reviewed with patient, not pertinent to this visit.       Review of Systems   Constitutional: Negative for chills and fever.   HENT: Positive for ear discharge and ear pain. Negative for sore throat.    All other systems reviewed and are negative.       Objective:   /74   Pulse 68   Temp 37.2 °C (98.9 °F) (Temporal)   Ht 1.689 m (5' 6.5\")   Wt 111.1 kg (245 lb)   BMI 38.95 kg/m²   Physical Exam  Vitals signs and nursing note reviewed.   Constitutional:       General: She is not in acute distress.     Appearance: She is well-developed.   HENT:      Head: Normocephalic and atraumatic.      Right Ear: Hearing normal.      Left Ear: Hearing normal. Swelling and tenderness present. A middle ear effusion is present. Tympanic membrane is erythematous and bulging.   Eyes:      Pupils: Pupils are equal, round, and reactive to light.   Cardiovascular:      Rate and Rhythm: Normal rate and regular rhythm.      Heart sounds: Normal heart sounds.   Pulmonary:      Effort: Pulmonary effort is normal.      Breath sounds: Normal breath sounds.   Musculoskeletal:      Comments: Normal movement in all 4 extremities   Skin:     General: Skin is warm and dry.   Neurological:      Mental Status: She is alert.      Coordination: Coordination normal.   Psychiatric:         Mood and Affect: Mood normal.         Assessment/Plan:   Assessment    1. Acute diffuse otitis externa of left ear  - amoxicillin (AMOXIL) 500 MG Cap; Take 1 Cap by mouth 3 times a day for 7 days.  Dispense: 21 Cap; Refill: 0  - ofloxacin otic sol (FLOXIN OTIC) 0.3 % Solution; Place 5 Drops in left ear every day for 7 days.  Dispense: 10 mL; Refill: 0  Differential diagnosis, natural history, supportive care, and indications for immediate follow-up discussed.   Patient given instructions and understanding of medications and treatment.    If not improving in 3-5 days, F/U " with PCP or return to UC if symptoms worsen.  Patient agreeable to plan.      Please note that this dictation was created using voice recognition software. I have made every reasonable attempt to correct obvious errors, but I expect that there are errors of grammar and possibly content that I did not discover before finalizing the note.    Ashkan Middleton PA-C

## 2020-03-07 ENCOUNTER — OFFICE VISIT (OUTPATIENT)
Dept: URGENT CARE | Facility: PHYSICIAN GROUP | Age: 35
End: 2020-03-07
Payer: COMMERCIAL

## 2020-03-07 VITALS
TEMPERATURE: 98.2 F | DIASTOLIC BLOOD PRESSURE: 80 MMHG | OXYGEN SATURATION: 97 % | SYSTOLIC BLOOD PRESSURE: 124 MMHG | HEART RATE: 99 BPM | WEIGHT: 245 LBS | RESPIRATION RATE: 18 BRPM | BODY MASS INDEX: 38.45 KG/M2 | HEIGHT: 67 IN

## 2020-03-07 DIAGNOSIS — H60.501 ACUTE OTITIS EXTERNA OF RIGHT EAR, UNSPECIFIED TYPE: ICD-10-CM

## 2020-03-07 PROCEDURE — 99214 OFFICE O/P EST MOD 30 MIN: CPT | Performed by: PHYSICIAN ASSISTANT

## 2020-03-07 RX ORDER — CIPROFLOXACIN AND DEXAMETHASONE 3; 1 MG/ML; MG/ML
4 SUSPENSION/ DROPS AURICULAR (OTIC) 2 TIMES DAILY
Qty: 1 BOTTLE | Refills: 0 | Status: SHIPPED | OUTPATIENT
Start: 2020-03-07 | End: 2020-03-14

## 2020-03-07 ASSESSMENT — ENCOUNTER SYMPTOMS
HEADACHES: 0
COUGH: 0
RHINORRHEA: 0
SORE THROAT: 0

## 2020-03-07 ASSESSMENT — FIBROSIS 4 INDEX: FIB4 SCORE: 0.61

## 2020-03-07 NOTE — PROGRESS NOTES
Subjective:   Catherine Porter is a 34 y.o. female who presents for Ear Pain (R ear pain, ear is plugged, tender, x1 week )        This is a new problem.  Patient complains of right ear pain x3 days.  States that she had a left ear infection last week that fully resolved with topical and oral antibiotic.  However she attempted to clean the right ear out using a wax remover.  Symptoms developed shortly thereafter.  She is out of topical antibiotic.  Completed course of amoxicillin.    Otalgia    There is pain in the right ear. This is a new problem. The current episode started in the past 7 days. The problem occurs constantly. The problem has been gradually worsening. There has been no fever. The pain is severe. Associated symptoms include ear discharge and hearing loss. Pertinent negatives include no coughing, headaches, rhinorrhea or sore throat. She has tried ear drops for the symptoms. The treatment provided no relief. There is no history of a chronic ear infection.     Review of Systems   HENT: Positive for ear discharge, ear pain and hearing loss. Negative for rhinorrhea and sore throat.    Respiratory: Negative for cough.    Neurological: Negative for headaches.       PMH:  has a past medical history of Hyperlipidemia LDL goal < 130 (7/30/2015). She also has no past medical history of Clotting disorder (HCC).  MEDS:   Current Outpatient Medications:   •  ciprofloxacin/dexamethasone (CIPRODEX) 0.3-0.1 % Suspension, Place 4 Drops in right ear 2 times a day for 7 days., Disp: 1 Bottle, Rfl: 0  •  escitalopram (LEXAPRO) 10 MG Tab, Take 1 Tab by mouth every day., Disp: 90 Tab, Rfl: 1  •  fexofenadine (ALLEGRA) 180 MG tablet, Take 1 Tab by mouth every day., Disp: 30 Tab, Rfl: 3  •  hydrOXYzine HCl (ATARAX) 25 MG Tab, Take 1 Tab by mouth 1 time daily as needed for Anxiety. (Patient not taking: Reported on 3/7/2020), Disp: 30 Tab, Rfl: 2  •  fluticasone-salmeterol (ADVAIR) 100-50 MCG/DOSE AEROSOL POWDER,  "BREATH ACTIVATED, Inhale 1 Puff by mouth every 12 hours., Disp: , Rfl:   •  IBUPROFEN PO, Take  by mouth., Disp: , Rfl:   ALLERGIES: No Known Allergies  SURGHX:   Past Surgical History:   Procedure Laterality Date   • WIDE EXCISION  12/20/2018    Procedure: WIDE EXCISION - LOCAL EXCISION OF VULVA;  Surgeon: Arya Quijano M.D.;  Location: SURGERY SAME DAY [facility];  Service: Gynecology   • REPEAT C SECTION  12/23/2013    Performed by Dony Bales M.D. at [facility]   • GYN SURGERY  primary c section    2010     SOCHX:  reports that she has never smoked. She has never used smokeless tobacco. She reports current alcohol use. She reports that she does not use drugs.  FH: Family history was reviewed, no pertinent findings to report   Objective:   /80 (BP Location: Left arm, Patient Position: Sitting, BP Cuff Size: Large adult)   Pulse 99   Temp 36.8 °C (98.2 °F) (Temporal)   Resp 18   Ht 1.689 m (5' 6.5\")   Wt 111.1 kg (245 lb)   SpO2 97%   BMI 38.95 kg/m²   Physical Exam  Vitals signs reviewed.   Constitutional:       General: She is not in acute distress.     Appearance: Normal appearance. She is well-developed. She is not toxic-appearing.   HENT:      Head: Normocephalic and atraumatic.      Right Ear: External ear normal.      Left Ear: Tympanic membrane, ear canal and external ear normal.      Ears:      Comments: Right EAC is moderately edematous and erythematous.  Moderate white discharge.  Significant pain with manipulation of tragus and auricle.  TM is visible and pearly white-intact.  Mastoid is nontender to palpation.  Positive for right-sided anterior cervical and preauricular lymphadenopathy.     Nose: Nose normal.   Eyes:      General: Lids are normal.      Conjunctiva/sclera: Conjunctivae normal.   Neck:      Musculoskeletal: Neck supple.   Cardiovascular:      Rate and Rhythm: Normal rate and regular rhythm.   Pulmonary:      Effort: Pulmonary effort is normal. No " respiratory distress.      Breath sounds: Normal breath sounds.   Skin:     General: Skin is warm and dry.      Capillary Refill: Capillary refill takes less than 2 seconds.   Neurological:      Mental Status: She is alert and oriented to person, place, and time.      Cranial Nerves: No cranial nerve deficit.      Sensory: No sensory deficit.   Psychiatric:         Speech: Speech normal.         Behavior: Behavior normal.         Thought Content: Thought content normal.         Judgment: Judgment normal.           Assessment/Plan:   1. Acute otitis externa of right ear, unspecified type  - ciprofloxacin/dexamethasone (CIPRODEX) 0.3-0.1 % Suspension; Place 4 Drops in right ear 2 times a day for 7 days.  Dispense: 1 Bottle; Refill: 0     No indication for oral antibiotic.  Patient started on topical antibiotic.  Return precautions given.    Differential diagnosis, natural history, supportive care, and indications for immediate follow-up discussed.

## 2020-03-07 NOTE — PATIENT INSTRUCTIONS
"Otitis Externa  Otitis externa is a germ infection in the outer ear. The outer ear is the area from the eardrum to the outside of the ear. Otitis externa is sometimes called \"swimmer's ear.\"  HOME CARE  · Put drops in the ear as told by your doctor.  · Only take medicine as told by your doctor.  · If you have diabetes, your doctor may give you more directions. Follow your doctor's directions.  · Keep all doctor visits as told.  To avoid another infection:  · Keep your ear dry. Use the corner of a towel to dry your ear after swimming or bathing.  · Avoid scratching or putting things inside your ear.  · Avoid swimming in lakes, dirty water, or pools that use a chemical called chlorine poorly.  · You may use ear drops after swimming. Combine equal amounts of white vinegar and alcohol in a bottle. Put 3 or 4 drops in each ear.  GET HELP IF:   · You have a fever.  · Your ear is still red, puffy (swollen), or painful after 3 days.  · You still have yellowish-white fluid (pus) coming from the ear after 3 days.  · Your redness, puffiness, or pain gets worse.  · You have a really bad headache.  · You have redness, puffiness, pain, or tenderness behind your ear.  MAKE SURE YOU:   · Understand these instructions.  · Will watch your condition.  · Will get help right away if you are not doing well or get worse.  This information is not intended to replace advice given to you by your health care provider. Make sure you discuss any questions you have with your health care provider.  Document Released: 06/05/2009 Document Revised: 01/08/2016 Document Reviewed: 09/26/2016  [employer] Interactive Patient Education © 2017 [employer]    "

## 2020-06-05 ENCOUNTER — HOSPITAL ENCOUNTER (OUTPATIENT)
Dept: LAB | Facility: MEDICAL CENTER | Age: 35
End: 2020-06-05
Attending: STUDENT IN AN ORGANIZED HEALTH CARE EDUCATION/TRAINING PROGRAM
Payer: COMMERCIAL

## 2020-06-05 LAB
25(OH)D3 SERPL-MCNC: 17 NG/ML (ref 30–100)
ALBUMIN SERPL BCP-MCNC: 4.2 G/DL (ref 3.2–4.9)
ALBUMIN/GLOB SERPL: 1.2 G/DL
ALP SERPL-CCNC: 68 U/L (ref 30–99)
ALT SERPL-CCNC: 18 U/L (ref 2–50)
ANION GAP SERPL CALC-SCNC: 11 MMOL/L (ref 7–16)
AST SERPL-CCNC: 16 U/L (ref 12–45)
BASOPHILS # BLD AUTO: 0.4 % (ref 0–1.8)
BASOPHILS # BLD: 0.03 K/UL (ref 0–0.12)
BILIRUB SERPL-MCNC: 0.3 MG/DL (ref 0.1–1.5)
BUN SERPL-MCNC: 8 MG/DL (ref 8–22)
CALCIUM SERPL-MCNC: 9.4 MG/DL (ref 8.5–10.5)
CHLORIDE SERPL-SCNC: 101 MMOL/L (ref 96–112)
CHOLEST SERPL-MCNC: 228 MG/DL (ref 100–199)
CO2 SERPL-SCNC: 24 MMOL/L (ref 20–33)
CREAT SERPL-MCNC: 0.45 MG/DL (ref 0.5–1.4)
EOSINOPHIL # BLD AUTO: 0.15 K/UL (ref 0–0.51)
EOSINOPHIL NFR BLD: 2.1 % (ref 0–6.9)
ERYTHROCYTE [DISTWIDTH] IN BLOOD BY AUTOMATED COUNT: 39.6 FL (ref 35.9–50)
EST. AVERAGE GLUCOSE BLD GHB EST-MCNC: 120 MG/DL
FASTING STATUS PATIENT QL REPORTED: NORMAL
GLOBULIN SER CALC-MCNC: 3.4 G/DL (ref 1.9–3.5)
GLUCOSE SERPL-MCNC: 102 MG/DL (ref 65–99)
HBA1C MFR BLD: 5.8 % (ref 0–5.6)
HCT VFR BLD AUTO: 42.2 % (ref 37–47)
HDLC SERPL-MCNC: 48 MG/DL
HGB BLD-MCNC: 13.5 G/DL (ref 12–16)
IMM GRANULOCYTES # BLD AUTO: 0.02 K/UL (ref 0–0.11)
IMM GRANULOCYTES NFR BLD AUTO: 0.3 % (ref 0–0.9)
LDLC SERPL CALC-MCNC: 143 MG/DL
LYMPHOCYTES # BLD AUTO: 1.79 K/UL (ref 1–4.8)
LYMPHOCYTES NFR BLD: 25 % (ref 22–41)
MCH RBC QN AUTO: 27.2 PG (ref 27–33)
MCHC RBC AUTO-ENTMCNC: 32 G/DL (ref 33.6–35)
MCV RBC AUTO: 85.1 FL (ref 81.4–97.8)
MONOCYTES # BLD AUTO: 0.37 K/UL (ref 0–0.85)
MONOCYTES NFR BLD AUTO: 5.2 % (ref 0–13.4)
NEUTROPHILS # BLD AUTO: 4.81 K/UL (ref 2–7.15)
NEUTROPHILS NFR BLD: 67 % (ref 44–72)
NRBC # BLD AUTO: 0 K/UL
NRBC BLD-RTO: 0 /100 WBC
PLATELET # BLD AUTO: 272 K/UL (ref 164–446)
PMV BLD AUTO: 9.8 FL (ref 9–12.9)
POTASSIUM SERPL-SCNC: 3.9 MMOL/L (ref 3.6–5.5)
PROT SERPL-MCNC: 7.6 G/DL (ref 6–8.2)
RBC # BLD AUTO: 4.96 M/UL (ref 4.2–5.4)
SODIUM SERPL-SCNC: 136 MMOL/L (ref 135–145)
TRIGL SERPL-MCNC: 184 MG/DL (ref 0–149)
TSH SERPL DL<=0.005 MIU/L-ACNC: 1.88 UIU/ML (ref 0.38–5.33)
WBC # BLD AUTO: 7.2 K/UL (ref 4.8–10.8)

## 2020-06-05 PROCEDURE — 85025 COMPLETE CBC W/AUTO DIFF WBC: CPT

## 2020-06-05 PROCEDURE — 36415 COLL VENOUS BLD VENIPUNCTURE: CPT

## 2020-06-05 PROCEDURE — 83036 HEMOGLOBIN GLYCOSYLATED A1C: CPT

## 2020-06-05 PROCEDURE — 80061 LIPID PANEL: CPT

## 2020-06-05 PROCEDURE — 82306 VITAMIN D 25 HYDROXY: CPT

## 2020-06-05 PROCEDURE — 84443 ASSAY THYROID STIM HORMONE: CPT

## 2020-06-05 PROCEDURE — 80053 COMPREHEN METABOLIC PANEL: CPT

## 2020-12-01 ENCOUNTER — OFFICE VISIT (OUTPATIENT)
Dept: URGENT CARE | Facility: PHYSICIAN GROUP | Age: 35
End: 2020-12-01
Payer: COMMERCIAL

## 2020-12-01 VITALS
WEIGHT: 245 LBS | SYSTOLIC BLOOD PRESSURE: 126 MMHG | RESPIRATION RATE: 12 BRPM | TEMPERATURE: 97.2 F | OXYGEN SATURATION: 99 % | DIASTOLIC BLOOD PRESSURE: 74 MMHG | BODY MASS INDEX: 38.45 KG/M2 | HEIGHT: 67 IN | HEART RATE: 82 BPM

## 2020-12-01 DIAGNOSIS — H60.503 ACUTE OTITIS EXTERNA OF BOTH EARS, UNSPECIFIED TYPE: ICD-10-CM

## 2020-12-01 PROCEDURE — 99214 OFFICE O/P EST MOD 30 MIN: CPT | Performed by: PHYSICIAN ASSISTANT

## 2020-12-01 RX ORDER — CIPROFLOXACIN AND DEXAMETHASONE 3; 1 MG/ML; MG/ML
SUSPENSION/ DROPS AURICULAR (OTIC)
Qty: 7.5 ML | Refills: 0 | Status: SHIPPED | OUTPATIENT
Start: 2020-12-01 | End: 2022-07-20

## 2020-12-01 ASSESSMENT — ENCOUNTER SYMPTOMS
SORE THROAT: 0
COUGH: 0
CARDIOVASCULAR NEGATIVE: 1
ABDOMINAL PAIN: 0
HEADACHES: 0
RHINORRHEA: 0

## 2020-12-01 ASSESSMENT — FIBROSIS 4 INDEX: FIB4 SCORE: 0.51

## 2020-12-01 NOTE — PROGRESS NOTES
ciSubjective:   Catherine Porter is a 35 y.o. female who presents for Otalgia (Right ear pain wtih with some drainage ongoing 1 week.  Would also like a referral to ENT.)      Otalgia   There is pain in both ears. This is a new problem. The current episode started in the past 7 days. The problem occurs constantly. The problem has been gradually worsening. Maximum temperature: Subjectice fever. The pain is mild. Associated symptoms include ear discharge. Pertinent negatives include no abdominal pain, coughing, headaches, hearing loss, rhinorrhea or sore throat. She has tried nothing for the symptoms. recurrent otitis externa    She does not swim in pools or hot tub.   Associated itching to ear canal.     Review of Systems   HENT: Positive for ear discharge and ear pain. Negative for hearing loss, rhinorrhea and sore throat.    Respiratory: Negative for cough.    Cardiovascular: Negative.    Gastrointestinal: Negative for abdominal pain.   Neurological: Negative for headaches.       Medications:    • escitalopram Tabs  • fexofenadine  • fluticasone-salmeterol Aepb  • hydrOXYzine HCl Tabs  • IBUPROFEN PO    Allergies: Patient has no known allergies.    Problem List: Catherine Porter has Family history of diabetes mellitus; Obesity; Anxiety; Hyperlipidemia with target LDL less than 130; Vitamin D deficiency; Tension headache; Nausea; PATRICIA positive for HSV; Sleep disorder breathing; Obesity (BMI 30-39.9); Health maintenance examination; Vulval lesion; Prediabetes; and Dyslipidemia on their problem list.    Surgical History:  Past Surgical History:   Procedure Laterality Date   • WIDE EXCISION  12/20/2018    Procedure: WIDE EXCISION - LOCAL EXCISION OF VULVA;  Surgeon: Arya Quijano M.D.;  Location: SURGERY SAME DAY [facility];  Service: Gynecology   • REPEAT C SECTION  12/23/2013    Performed by Dony Bales M.D. at [facility]   • GYN SURGERY  primary c section    2010       Past  "Social Hx: Catherine Porter  reports that she has never smoked. She has never used smokeless tobacco. She reports current alcohol use. She reports that she does not use drugs.     Past Family Hx:  Catherine Porter family history includes Arthritis in her mother; Cancer in her maternal grandmother; Diabetes in her father; Heart Attack in her father; Heart Disease in her father; Hypertension in her mother.     Problem list, medications, and allergies reviewed by myself today in Epic.     Objective:     /74   Pulse 82   Temp 36.2 °C (97.2 °F) (Temporal)   Resp 12   Ht 1.689 m (5' 6.5\")   Wt 111.1 kg (245 lb)   LMP 11/17/2020 (Exact Date)   SpO2 99%   Breastfeeding Unknown   BMI 38.95 kg/m²     Physical Exam  Vitals signs reviewed.   Constitutional:       General: She is not in acute distress.     Appearance: Normal appearance. She is not ill-appearing or toxic-appearing.   HENT:      Right Ear: Tympanic membrane normal. No mastoid tenderness.      Left Ear: Tympanic membrane normal. No mastoid tenderness.      Ears:      Comments: Right ear canal: Mildly erythematous and mild to moderately edematous ear canal.  No drainage.    Left ear canal: Mild edematous ear canal without erythema or drainage.     Mouth/Throat:      Mouth: Mucous membranes are moist.      Pharynx: Oropharynx is clear. No oropharyngeal exudate or posterior oropharyngeal erythema.   Eyes:      Conjunctiva/sclera: Conjunctivae normal.      Pupils: Pupils are equal, round, and reactive to light.   Cardiovascular:      Rate and Rhythm: Normal rate.   Pulmonary:      Effort: Pulmonary effort is normal.   Skin:     General: Skin is warm and dry.   Neurological:      General: No focal deficit present.      Mental Status: She is alert and oriented to person, place, and time.   Psychiatric:         Mood and Affect: Mood normal.         Behavior: Behavior normal.         Assessment/Plan:     Diagnosis and associated orders: "     1. Acute otitis externa of both ears, unspecified type  REFERRAL TO ENT    ciprofloxacin/dexamethasone (CIPRODEX) 0.3-0.1 % Suspension      Comments/MDM:     • Antibiotic eardrops.   • She states she was treated in the past with oral antibiotics as well.  Discussed no indication for oral antibiotics at this time.   • Patient requesting referral to ENT due to recurrent otitis externa.   • Do not place anything in the ear.   • She reports associated itching.  Recommended nonsedating oral antihistamine in the morning time.   • No signs of fungal otitis externa.         Red flags discussed  Supportive care, differential diagnoses, and indications for immediate follow-up discussed with patient.    Patient expresses understanding and agrees to plan.    Advised the patient to follow-up with the primary care physician for recheck, reevaluation, and consideration of further management.    Please note that this dictation was created using voice recognition software. I have made a reasonable attempt to correct obvious errors, but I expect that there are errors of grammar and possibly content that I did not discover before finalizing the note.    This note was electronically signed by Cheng Ferris PA-C

## 2022-01-04 DIAGNOSIS — F41.9 ANXIETY: ICD-10-CM

## 2022-01-04 DIAGNOSIS — J01.00 ACUTE NON-RECURRENT MAXILLARY SINUSITIS: ICD-10-CM

## 2022-01-04 RX ORDER — ESCITALOPRAM OXALATE 10 MG/1
TABLET ORAL
Qty: 90 TABLET | Refills: 0 | Status: SHIPPED | OUTPATIENT
Start: 2022-01-04 | End: 2022-06-30 | Stop reason: SDUPTHER

## 2022-01-04 RX ORDER — FEXOFENADINE HYDROCHLORIDE 180 MG/1
TABLET, FILM COATED ORAL
Qty: 90 TABLET | Refills: 0 | Status: SHIPPED | OUTPATIENT
Start: 2022-01-04

## 2022-01-04 NOTE — TELEPHONE ENCOUNTER
Last seen: 07/22/21 by Dr. Eduardo Nunez appt: None / pt of      Was the patient seen in the last year in this department? Yes   Does patient have an active prescription for medications requested? No   Received Request Via: Pharmacy

## 2022-03-18 ENCOUNTER — OFFICE VISIT (OUTPATIENT)
Dept: URGENT CARE | Facility: PHYSICIAN GROUP | Age: 37
End: 2022-03-18
Payer: COMMERCIAL

## 2022-03-18 VITALS
OXYGEN SATURATION: 97 % | HEART RATE: 97 BPM | TEMPERATURE: 97.8 F | DIASTOLIC BLOOD PRESSURE: 80 MMHG | WEIGHT: 260 LBS | SYSTOLIC BLOOD PRESSURE: 150 MMHG | HEIGHT: 67 IN | BODY MASS INDEX: 40.81 KG/M2 | RESPIRATION RATE: 16 BRPM

## 2022-03-18 DIAGNOSIS — H60.391 OTITIS, EXTERNA, INFECTIVE, RIGHT: ICD-10-CM

## 2022-03-18 DIAGNOSIS — Z86.69 HISTORY OF RECURRENT EAR INFECTION: ICD-10-CM

## 2022-03-18 PROCEDURE — 99214 OFFICE O/P EST MOD 30 MIN: CPT | Performed by: NURSE PRACTITIONER

## 2022-03-18 ASSESSMENT — ENCOUNTER SYMPTOMS
CONSTITUTIONAL NEGATIVE: 1
SORE THROAT: 0
RESPIRATORY NEGATIVE: 1

## 2022-03-18 ASSESSMENT — FIBROSIS 4 INDEX: FIB4 SCORE: .5268638761782118808

## 2022-03-18 ASSESSMENT — VISUAL ACUITY: OU: 1

## 2022-03-18 NOTE — PROGRESS NOTES
Subjective:     Catherine Porter is a 36 y.o. female who presents for Otalgia (Right ear infection, woke up with leaking from right eat and been having some pain on right ear )       Otalgia   There is pain in the right ear. This is a new problem. The problem has been gradually worsening. Associated symptoms include ear discharge. Pertinent negatives include no sore throat.     Patient reporting right ear pain.  Reports tenderness around the ear and at the right side of the face as well.  Placed a cotton swab into her right ear and noticed yellow discharge on it.    Patient reports history of recurrent ear infections.  Sees ENT.  Office is closed today.  Used to swim in her younger years with no problems.  Not recently swimming.    Patient was screened prior to rooming and denied COVID-19 diagnosis or contact with a person who has been diagnosed or is suspected to have COVID-19. During this visit, appropriate PPE was worn, hand hygiene was performed, and the patient and any visitors were masked.     PMH:  has a past medical history of Hyperlipidemia LDL goal < 130 (7/30/2015).    She has no past medical history of Clotting disorder (HCC).    MEDS:   Current Outpatient Medications:   •  neomycin sulf/polymyx B sulf/HC soln (CORTISPORIN HC SOL) 3.5-81869-4 Solution, Administer 4 Drops into the right ear 4 times a day for 10 days., Disp: 10 mL, Rfl: 0  •  EQ ALLERGY RELIEF 180 MG tablet, Take 1 tablet by mouth once daily, Disp: 90 Tablet, Rfl: 0  •  escitalopram (LEXAPRO) 10 MG Tab, Take 1 tablet by mouth once daily, Disp: 90 Tablet, Rfl: 0  •  ciprofloxacin/dexamethasone (CIPRODEX) 0.3-0.1 % Suspension, Instill 4 drops into affected ear(s) twice per day for 7 days, Disp: 7.5 mL, Rfl: 0  •  fluticasone-salmeterol (ADVAIR) 100-50 MCG/DOSE AEROSOL POWDER, BREATH ACTIVATED, Inhale 1 Puff by mouth every 12 hours., Disp: , Rfl:   •  IBUPROFEN PO, Take  by mouth., Disp: , Rfl:   •  hydrOXYzine HCl (ATARAX) 25 MG  "Tab, Take 1 Tab by mouth 1 time daily as needed for Anxiety. (Patient not taking: No sig reported), Disp: 30 Tab, Rfl: 2    ALLERGIES: No Known Allergies    SURGHX:   Past Surgical History:   Procedure Laterality Date   • WIDE EXCISION  12/20/2018    Procedure: WIDE EXCISION - LOCAL EXCISION OF VULVA;  Surgeon: Arya Quijano M.D.;  Location: SURGERY SAME DAY [facility];  Service: Gynecology   • REPEAT C SECTION  12/23/2013    Performed by Dony Bales M.D. at [facility] DELIVERY   • GYN SURGERY  primary c section    2010     SOCHX:  reports that she has never smoked. She has never used smokeless tobacco. She reports current alcohol use. She reports that she does not use drugs.     FH: Reviewed with patient, not pertinent to this visit.    Review of Systems   Constitutional: Negative.    HENT: Positive for ear discharge and ear pain. Negative for congestion and sore throat.    Respiratory: Negative.    All other systems reviewed and are negative.    Additional details per HPI.      Objective:     /80 (BP Location: Right arm, Patient Position: Sitting, BP Cuff Size: Large adult)   Pulse 97   Temp 36.6 °C (97.8 °F) (Temporal)   Resp 16   Ht 1.702 m (5' 7\")   Wt 118 kg (260 lb)   SpO2 97%   BMI 40.72 kg/m²     Physical Exam  Vitals reviewed.   Constitutional:       General: She is not in acute distress.     Appearance: She is well-developed. She is not ill-appearing or toxic-appearing.   HENT:      Right Ear: Swelling (EAC) and tenderness (Tragus, EAC) present. No drainage. Tympanic membrane is not perforated, erythematous or bulging.      Left Ear: Ear canal normal. No drainage, swelling or tenderness. Tympanic membrane is not perforated, erythematous or bulging.      Nose: Nose normal.   Eyes:      General: Vision grossly intact.      Extraocular Movements: Extraocular movements intact.      Conjunctiva/sclera: Conjunctivae normal.   Cardiovascular:      Rate and Rhythm: Normal rate.   Pulmonary: "      Effort: Pulmonary effort is normal. No respiratory distress.   Musculoskeletal:         General: No deformity. Normal range of motion.      Cervical back: Normal range of motion.   Lymphadenopathy:      Head:      Right side of head: Preauricular adenopathy present.      Cervical: Cervical adenopathy present.   Skin:     General: Skin is warm and dry.      Coloration: Skin is not pale.   Neurological:      Mental Status: She is alert and oriented to person, place, and time.      Sensory: No sensory deficit.      Motor: No weakness.      Coordination: Coordination normal.   Psychiatric:         Behavior: Behavior normal. Behavior is cooperative.       Assessment/Plan:     1. Otitis, externa, infective, right  - neomycin sulf/polymyx B sulf/HC soln (CORTISPORIN HC SOL) 3.5-15307-7 Solution; Administer 4 Drops into the right ear 4 times a day for 10 days.  Dispense: 10 mL; Refill: 0    Rx as above sent electronically.     Differential diagnosis, natural history, supportive care, rest, fluids, over-the-counter symptom management per 's instructions, ibuprofen, close monitoring, and indications for immediate follow-up discussed.     Vital signs stable, afebrile, no acute distress at this time.  Patient will follow up with ENT.    Warning signs reviewed. Return precautions discussed.     All questions answered. Patient agrees with the plan of care.    Discharge summary provided.

## 2022-03-18 NOTE — LETTER
March 18, 2022         Patient: Catherine Porter   YOB: 1985   Date of Visit: 3/18/2022           To Whom it May Concern:    Catherine Porter was seen in my clinic on 3/18/2022 due to illness. Due to medical necessity, please excuse patient from work 3/18/2022 as well as for up to the next 4 days as needed.     If you have any questions or concerns, please don't hesitate to call.        Sincerely,         RAFI Strauss.  Electronically Signed

## 2022-06-30 ENCOUNTER — TELEPHONE (OUTPATIENT)
Dept: SCHEDULING | Facility: IMAGING CENTER | Age: 37
End: 2022-06-30

## 2022-07-20 ENCOUNTER — OFFICE VISIT (OUTPATIENT)
Dept: MEDICAL GROUP | Age: 37
End: 2022-07-20
Payer: COMMERCIAL

## 2022-07-20 VITALS
TEMPERATURE: 98.7 F | WEIGHT: 259 LBS | SYSTOLIC BLOOD PRESSURE: 122 MMHG | OXYGEN SATURATION: 98 % | DIASTOLIC BLOOD PRESSURE: 86 MMHG | BODY MASS INDEX: 40.65 KG/M2 | HEART RATE: 68 BPM | HEIGHT: 67 IN

## 2022-07-20 DIAGNOSIS — E55.9 HYPOVITAMINOSIS D: ICD-10-CM

## 2022-07-20 DIAGNOSIS — E78.5 DYSLIPIDEMIA: ICD-10-CM

## 2022-07-20 DIAGNOSIS — Z83.3 FAMILY HISTORY OF DIABETES MELLITUS: ICD-10-CM

## 2022-07-20 DIAGNOSIS — R73.01 IFG (IMPAIRED FASTING GLUCOSE): ICD-10-CM

## 2022-07-20 DIAGNOSIS — F41.9 ANXIETY: ICD-10-CM

## 2022-07-20 PROBLEM — R73.03 PREDIABETES: Status: RESOLVED | Noted: 2019-02-06 | Resolved: 2022-07-20

## 2022-07-20 PROBLEM — N90.89 VULVAL LESION: Status: RESOLVED | Noted: 2018-11-06 | Resolved: 2022-07-20

## 2022-07-20 PROBLEM — E66.9 OBESITY (BMI 30-39.9): Status: RESOLVED | Noted: 2018-08-21 | Resolved: 2022-07-20

## 2022-07-20 PROCEDURE — 99204 OFFICE O/P NEW MOD 45 MIN: CPT | Performed by: INTERNAL MEDICINE

## 2022-07-20 RX ORDER — ESCITALOPRAM OXALATE 10 MG/1
10 TABLET ORAL DAILY
Qty: 90 TABLET | Refills: 0 | Status: SHIPPED | OUTPATIENT
Start: 2022-07-20 | End: 2023-03-29 | Stop reason: SDUPTHER

## 2022-07-20 RX ORDER — PHENTERMINE HYDROCHLORIDE 37.5 MG/1
37.5 TABLET ORAL
Qty: 30 TABLET | Refills: 0 | Status: SHIPPED | OUTPATIENT
Start: 2022-07-20 | End: 2022-08-19

## 2022-07-20 ASSESSMENT — ANXIETY QUESTIONNAIRES
2. NOT BEING ABLE TO STOP OR CONTROL WORRYING: SEVERAL DAYS
1. FEELING NERVOUS, ANXIOUS, OR ON EDGE: NOT AT ALL
3. WORRYING TOO MUCH ABOUT DIFFERENT THINGS: NEARLY EVERY DAY
GAD7 TOTAL SCORE: 9
7. FEELING AFRAID AS IF SOMETHING AWFUL MIGHT HAPPEN: NEARLY EVERY DAY
5. BEING SO RESTLESS THAT IT IS HARD TO SIT STILL: NOT AT ALL
4. TROUBLE RELAXING: SEVERAL DAYS
6. BECOMING EASILY ANNOYED OR IRRITABLE: SEVERAL DAYS

## 2022-07-20 ASSESSMENT — PATIENT HEALTH QUESTIONNAIRE - PHQ9: CLINICAL INTERPRETATION OF PHQ2 SCORE: 0

## 2022-07-20 NOTE — PROGRESS NOTES
CHIEF COMPLAINT  Chief Complaint   Patient presents with   • Establish Care     Pt is here to establish care     HPI  Catherine Porter is a 36 y.o. female who presents today for the following     Obesity, dyslipidemia, IFG/family history DM  36-year-old, female, BMI 40 and associated comorbidities    Diet: Regular  Exercise: Insufficient  No temperature intolerance. No change in hair/skin quality, BMs.   No HTN, buffalo hump, purple striae, flushing.  FH of obesity: Unknown    Anxiety  The patient has history of anxiety, on Lexapro 20 mg daily, requested referral for counseling.  KHOA 7 7/20/2022   KHOA-7 Total Score 9     Depression Screen (PHQ-2/PHQ-9) 8/21/2018 2/6/2019 7/20/2022   PHQ-2 Total Score - - -   PHQ-2 Total Score 0 2 0   PHQ-9 Total Score - 3 -     Hypovitaminosis D  The patient had low vitamin D level.  Vitamin D supplement: none.    Reviewed PMH, PSH, FH, SH, ALL, HCM/IMM  Reviewed MEDS    Patient Active Problem List    Diagnosis Date Noted   • Dyslipidemia 02/06/2019   • Obesity (BMI 30-39.9) 08/21/2018   • Health maintenance examination 08/21/2018   • Sleep disorder breathing 03/16/2017   • PATRICIA positive for HSV 09/16/2016   • Vitamin D deficiency 08/27/2015   • Tension headache 08/27/2015   • Anxiety 07/16/2015   • Family history of diabetes mellitus 06/30/2015     CURRENT MEDICATIONS  Current Outpatient Medications   Medication Sig Dispense Refill   • escitalopram (LEXAPRO) 10 MG Tab Take 1 Tablet by mouth every day. 90 Tablet 0   • EQ ALLERGY RELIEF 180 MG tablet Take 1 tablet by mouth once daily 90 Tablet 0   • IBUPROFEN PO Take  by mouth.       No current facility-administered medications for this visit.     ALLERGIES  Allergies: Patient has no known allergies.  PAST MEDICAL HISTORY  Past Medical History:   Diagnosis Date   • Hyperlipidemia LDL goal < 130 7/30/2015     SURGICAL HISTORY  She  has a past surgical history that includes gyn surgery (primary c section); repeat c section  "(2013); and wide excision (2018).  SOCIAL HISTORY  Social History     Tobacco Use   • Smoking status: Never Smoker   • Smokeless tobacco: Never Used   Vaping Use   • Vaping Use: Never used   Substance Use Topics   • Alcohol use: Yes     Comment: 4/month or less   • Drug use: No     Social History     Social History Narrative   • Not on file     FAMILY HISTORY  Family History   Problem Relation Age of Onset   • Hypertension Mother    • Arthritis Mother    • Diabetes Father    • Heart Disease Father    • Heart Attack Father    • Cancer Maternal Grandmother         breast     Family Status   Relation Name Status   • Mo  Alive   • Fa     • Bro  Alive   • MGMo     • Bro  Alive     ROS   Constitutional: Negative for fever, chills.  HENT: Negative for congestion, sore throat.  Eyes: Negative for vision problems.   Respiratory: Negative for cough, shortness of breath.  Cardiovascular: Negative for chest pain, palpitations.   Gastrointestinal: Negative for heartburn, nausea, abdominal pain.   Genitourinary: Negative for dysuria.  Musculoskeletal: Negative for significant myalgia, back and joint pain.   Skin: Negative for rash.   Neuro: Negative for dizziness, weakness and headaches.   Endo/Heme/Allergies: Does not bruise/bleed easily.   Psychiatric/Behavioral: Negative for depression.    PHYSICAL EXAM   Blood Pressure 122/86 (BP Location: Left arm, Patient Position: Sitting, BP Cuff Size: Adult)   Pulse 68   Temperature 37.1 °C (98.7 °F) (Temporal)   Height 1.702 m (5' 7\")   Weight 117 kg (259 lb)   Oxygen Saturation 98%  Body mass index is 40.57 kg/m².  General:  NAD, well appearing  HEENT:   NC/AT, PERRLA, EOMI.  Cardiovascular: unlabored breathing, no peripheral cyanosis or swelling.  Lungs:   no respiratory distress.  Abdomen: non- distended.  Extremities:  No LE swelling.  Skin:  Warm, dry.  No erythema. No rash.   Neurologic: Alert & oriented x 3. CN II-XII grossly intact. No focal " deficits.  Psychiatric:  Affect normal, mood normal, judgment normal.    Labs     Labs are reviewed and discussed with a patient  Lab Results   Component Value Date/Time    CHOLSTRLTOT 228 (H) 06/05/2020 08:59 AM     (H) 06/05/2020 08:59 AM    HDL 48 06/05/2020 08:59 AM    TRIGLYCERIDE 184 (H) 06/05/2020 08:59 AM       Lab Results   Component Value Date/Time    SODIUM 136 06/05/2020 08:59 AM    POTASSIUM 3.9 06/05/2020 08:59 AM    CHLORIDE 101 06/05/2020 08:59 AM    CO2 24 06/05/2020 08:59 AM    GLUCOSE 102 (H) 06/05/2020 08:59 AM    BUN 8 06/05/2020 08:59 AM    CREATININE 0.45 (L) 06/05/2020 08:59 AM     Lab Results   Component Value Date/Time    ALKPHOSPHAT 68 06/05/2020 08:59 AM    ASTSGOT 16 06/05/2020 08:59 AM    ALTSGPT 18 06/05/2020 08:59 AM    TBILIRUBIN 0.3 06/05/2020 08:59 AM      Lab Results   Component Value Date/Time    HBA1C 5.8 (H) 06/05/2020 08:59 AM    HBA1C 5.9 (H) 11/23/2019 08:41 AM    HBA1C 5.8 (H) 12/29/2018 12:47 PM     No results found for: TSH  No results found for: FREET4    Lab Results   Component Value Date/Time    WBC 7.2 06/05/2020 08:59 AM    RBC 4.96 06/05/2020 08:59 AM    HEMOGLOBIN 13.5 06/05/2020 08:59 AM    HEMATOCRIT 42.2 06/05/2020 08:59 AM    MCV 85.1 06/05/2020 08:59 AM    MCH 27.2 06/05/2020 08:59 AM    MCHC 32.0 (L) 06/05/2020 08:59 AM    MPV 9.8 06/05/2020 08:59 AM    NEUTSPOLYS 67.00 06/05/2020 08:59 AM    LYMPHOCYTES 25.00 06/05/2020 08:59 AM    MONOCYTES 5.20 06/05/2020 08:59 AM    EOSINOPHILS 2.10 06/05/2020 08:59 AM    BASOPHILS 0.40 06/05/2020 08:59 AM    HYPOCHROMIA 1+ 12/23/2013 11:16 AM      Imaging     None    Assessment and Plan     Catherine Porter is a 36 y.o. female    1. BMI 40.0-44.9, adult (HCC)  - OBESITY COUNSELING (No Charge): Patient identified as having weight management issue.  Appropriate orders and counseling given.  - phentermine (ADIPEX-P) 37.5 MG tablet; Take 1 Tablet by mouth every morning before breakfast for 30 days.   Dispense: 30 Tablet; Refill: 0    Obtained and reviewed patient utilization report from [facility] pharmacy database on 7/20/2022 9:24 AM  prior to writing prescription for controlled substance II, III or IV per [place] bill . Based on assessment of the report, the prescription is medically necessary.     2. Dyslipidemia  Advised low-calorie diet, daily exercise  - Comp Metabolic Panel; Standing  - Lipid Profile; Standing  - OBESITY COUNSELING (No Charge): Patient identified as having weight management issue.  Appropriate orders and counseling given.  - phentermine (ADIPEX-P) 37.5 MG tablet; Take 1 Tablet by mouth every morning before breakfast for 30 days.  Dispense: 30 Tablet; Refill: 0  3. IFG (impaired fasting glucose)  - Comp Metabolic Panel; Standing  - HEMOGLOBIN A1C; Standing  - OBESITY COUNSELING (No Charge): Patient identified as having weight management issue.  Appropriate orders and counseling given.  - phentermine (ADIPEX-P) 37.5 MG tablet; Take 1 Tablet by mouth every morning before breakfast for 30 days.  Dispense: 30 Tablet; Refill: 0    4. Family history of diabetes mellitus  As above    5. Anxiety  Requested referral  - Referral to Behavioral Health  - escitalopram (LEXAPRO) 10 MG Tab; Take 1 Tablet by mouth every day.  Dispense: 90 Tablet; Refill: 0    6. Hypovitaminosis D  No supplement, follow-up labs  - VITAMIN D,25 HYDROXY; Standing    Followup: In 4 weeks, labs, possibly phentermine refill    All questions are answered.    Please note that this dictation was created using voice recognition software, and that there might be errors of wanda and possibly content.

## 2023-03-20 ENCOUNTER — OFFICE VISIT (OUTPATIENT)
Dept: MEDICAL GROUP | Facility: PHYSICIAN GROUP | Age: 38
End: 2023-03-20
Payer: COMMERCIAL

## 2023-03-20 ENCOUNTER — HOSPITAL ENCOUNTER (OUTPATIENT)
Facility: MEDICAL CENTER | Age: 38
End: 2023-03-20
Attending: INTERNAL MEDICINE
Payer: COMMERCIAL

## 2023-03-20 VITALS
WEIGHT: 263 LBS | DIASTOLIC BLOOD PRESSURE: 76 MMHG | HEIGHT: 67 IN | RESPIRATION RATE: 14 BRPM | SYSTOLIC BLOOD PRESSURE: 130 MMHG | TEMPERATURE: 97.4 F | BODY MASS INDEX: 41.28 KG/M2 | OXYGEN SATURATION: 97 % | HEART RATE: 78 BPM

## 2023-03-20 DIAGNOSIS — R73.03 PREDIABETES: ICD-10-CM

## 2023-03-20 DIAGNOSIS — G47.33 OSA ON CPAP: ICD-10-CM

## 2023-03-20 DIAGNOSIS — E78.5 DYSLIPIDEMIA: ICD-10-CM

## 2023-03-20 DIAGNOSIS — Z13.21 ENCOUNTER FOR VITAMIN DEFICIENCY SCREENING: ICD-10-CM

## 2023-03-20 DIAGNOSIS — R07.9 CHEST PAIN, UNSPECIFIED TYPE: ICD-10-CM

## 2023-03-20 DIAGNOSIS — R06.02 SOB (SHORTNESS OF BREATH) ON EXERTION: ICD-10-CM

## 2023-03-20 DIAGNOSIS — N89.8 VAGINAL DISCHARGE: ICD-10-CM

## 2023-03-20 DIAGNOSIS — Z12.31 ENCOUNTER FOR SCREENING MAMMOGRAM FOR MALIGNANT NEOPLASM OF BREAST: ICD-10-CM

## 2023-03-20 DIAGNOSIS — E66.01 MORBID OBESITY (HCC): ICD-10-CM

## 2023-03-20 DIAGNOSIS — E55.9 VITAMIN D DEFICIENCY: ICD-10-CM

## 2023-03-20 DIAGNOSIS — N92.0 MENORRHAGIA WITH REGULAR CYCLE: ICD-10-CM

## 2023-03-20 DIAGNOSIS — R35.0 URINE FREQUENCY: ICD-10-CM

## 2023-03-20 DIAGNOSIS — F41.9 ANXIETY: ICD-10-CM

## 2023-03-20 DIAGNOSIS — Z80.3 FAMILY HISTORY OF BREAST CANCER: ICD-10-CM

## 2023-03-20 PROBLEM — G47.30 SLEEP DISORDER BREATHING: Status: RESOLVED | Noted: 2017-03-16 | Resolved: 2023-03-20

## 2023-03-20 PROBLEM — Z00.00 HEALTH MAINTENANCE EXAMINATION: Status: RESOLVED | Noted: 2018-08-21 | Resolved: 2023-03-20

## 2023-03-20 LAB — EKG 4674: NORMAL

## 2023-03-20 PROCEDURE — 87480 CANDIDA DNA DIR PROBE: CPT

## 2023-03-20 PROCEDURE — 87510 GARDNER VAG DNA DIR PROBE: CPT

## 2023-03-20 PROCEDURE — 93000 ELECTROCARDIOGRAM COMPLETE: CPT | Performed by: INTERNAL MEDICINE

## 2023-03-20 PROCEDURE — 99204 OFFICE O/P NEW MOD 45 MIN: CPT | Performed by: INTERNAL MEDICINE

## 2023-03-20 PROCEDURE — 87660 TRICHOMONAS VAGIN DIR PROBE: CPT

## 2023-03-20 NOTE — PROGRESS NOTES
New Patient to establish    Chief Complaint   Patient presents with    Establish Care    Shortness of Breath    Stress    LLQ Pain       Subjective:     History of Present Illness: Patient is a 37 y.o. female who is here today to establish primary care    Current Outpatient Medications on File Prior to Visit   Medication Sig Dispense Refill    escitalopram (LEXAPRO) 10 MG Tab Take 1 Tablet by mouth every day. 90 Tablet 0    EQ ALLERGY RELIEF 180 MG tablet Take 1 tablet by mouth once daily 90 Tablet 0    IBUPROFEN PO Take  by mouth.       No current facility-administered medications on file prior to visit.     No Known Allergies  Patient Active Problem List    Diagnosis Date Noted    MARY on CPAP 03/20/2023    Morbid obesity (HCC) 03/20/2023    Prediabetes 02/06/2019    Dyslipidemia 02/06/2019    Vitamin D deficiency 08/27/2015    Tension headache 08/27/2015    Anxiety 07/16/2015     Past Medical History:   Diagnosis Date    Hyperlipidemia LDL goal < 130 7/30/2015     Past Surgical History:   Procedure Laterality Date    WIDE EXCISION  12/20/2018    Procedure: WIDE EXCISION - LOCAL EXCISION OF VULVA;  Surgeon: Arya Quijano M.D.;  Location: SURGERY SAME DAY [facility];  Service: Gynecology    REPEAT C SECTION  12/23/2013    Performed by Dony Bales M.D. at LABOR [facility] DELIVERY    GYN SURGERY  primary c section    2010     Family History   Problem Relation Age of Onset    Hypertension Mother     Arthritis Mother     Diabetes Father     Heart Disease Father     Heart Attack Father     Cancer Maternal Grandmother         breast     Social History     Tobacco Use    Smoking status: Never    Smokeless tobacco: Never   Vaping Use    Vaping Use: Never used   Substance Use Topics    Alcohol use: Yes     Comment: 4/month or less    Drug use: No       ROS:  All other systems reviewed and are negative except as stated in the HPI       Physical Exam:     /76 (BP Location: Right arm, Patient Position: Sitting, BP Cuff  "Size: Large adult)   Pulse 78   Temp 36.3 °C (97.4 °F) (Temporal)   Resp 14   Ht 1.702 m (5' 7\")   Wt 119 kg (263 lb)   SpO2 97%   BMI 41.19 kg/m²   General: Normal appearing. No distress.  Pulmonary: Clear to ausculation.  Normal effort.   Cardiovascular: Regular rate and rhythm    Assessment and Plan:     1. MARY on CPAP  Chronic, stable, she is not following up with the sleep medicine, she use CPAP machine intermittently  - Comp Metabolic Panel; Future    2. Prediabetes  - HEMOGLOBIN A1C; Future    3. Dyslipidemia  She would like to perform labs  - Lipid Profile; Future    4. Morbid obesity (HCC)  - FREE THYROXINE; Future  - T3 FREE; Future  - TSH; Future    5. Vitamin D deficiency  - VITAMIN D,25 HYDROXY (DEFICIENCY); Future    6. Anxiety  Also has some anxiety, denies any depression, denies any suicidal homicidal ideation, she has children's and teenagers that make her busy with some anxiety issues as well  - VITAMIN B12; Future    7. Encounter for screening mammogram for malignant neoplasm of breast  - MA-SCREENING MAMMO BILAT W/CAD; Future    8. Menorrhagia with regular cycle  Reported irregular cycle, however heavy cycle in the first few days,  - CBC WITH DIFFERENTIAL; Future  - FERRITIN; Future  - IRON/TOTAL IRON BIND; Future    9. Chest pain, unspecified type  Mention some chest pain that is not classical for cardiac, is not exacerbation during exercise, sometimes associated with shortness of breath, denied having other alarm signs for cardiac symptoms  - EKG  Sinus rhythm, no axis deviation, normal duration, no acute ST-T wave changes, low voltage likely secondary to morbid obesity    10. SOB (shortness of breath) on exertion  This is mostly on exertion when she go up stair, denied having wheezing or cough or symptoms for asthma, liver and kidney function before were normal, denied having orthopnea, PND, leg swelling or other symptoms, I believe this is associated with weight, if this is continue " will perform more tests including PFT  - FREE THYROXINE; Future  - T3 FREE; Future  - TSH; Future  - EKG    11. Urine frequency  Denied having hematuria or dysuria, possibly urine infection, denies any fever or chills or other alarm signs or symptoms  - URINALYSIS,CULTURE IF INDICATED; Future    12. Vaginal discharge  2 weeks ago, need to rule out vaginal pathogens  - VAGINAL PATHOGENS DNA PANEL; Future    13. Family history of breast cancer  Would like to perform a mammogram due to Family history of breast cancer, her aunt at the age of 60s as well as grandmother  - MA-SCREENING MAMMO BILAT W/CAD; Future    14. Encounter for vitamin deficiency screening  - VITAMIN B12; Future    Follow Up:   Labs    Please note that this dictation was created using voice recognition software. I have made every reasonable attempt to correct obvious errors, but I expect that there are errors of grammar and possibly content that I did not discover before finalizing the note.    Signed by: Zainab Lamb M.D.

## 2023-03-21 DIAGNOSIS — N76.0 BACTERIAL VAGINOSIS: ICD-10-CM

## 2023-03-21 DIAGNOSIS — B96.89 BACTERIAL VAGINOSIS: ICD-10-CM

## 2023-03-21 LAB
CANDIDA DNA VAG QL PROBE+SIG AMP: NEGATIVE
G VAGINALIS DNA VAG QL PROBE+SIG AMP: POSITIVE
T VAGINALIS DNA VAG QL PROBE+SIG AMP: NEGATIVE

## 2023-03-21 RX ORDER — METRONIDAZOLE 500 MG/1
500 TABLET ORAL 2 TIMES DAILY
Qty: 14 TABLET | Refills: 0 | Status: SHIPPED | OUTPATIENT
Start: 2023-03-21 | End: 2023-03-28

## 2023-03-22 ENCOUNTER — HOSPITAL ENCOUNTER (OUTPATIENT)
Facility: MEDICAL CENTER | Age: 38
End: 2023-03-22
Payer: COMMERCIAL

## 2023-03-22 ENCOUNTER — OFFICE VISIT (OUTPATIENT)
Dept: MEDICAL GROUP | Facility: PHYSICIAN GROUP | Age: 38
End: 2023-03-22
Payer: COMMERCIAL

## 2023-03-22 VITALS
OXYGEN SATURATION: 97 % | TEMPERATURE: 97.4 F | BODY MASS INDEX: 41.28 KG/M2 | WEIGHT: 263 LBS | DIASTOLIC BLOOD PRESSURE: 84 MMHG | HEIGHT: 67 IN | SYSTOLIC BLOOD PRESSURE: 122 MMHG | HEART RATE: 82 BPM

## 2023-03-22 DIAGNOSIS — Z80.3 FAMILY HISTORY OF BREAST CANCER: ICD-10-CM

## 2023-03-22 DIAGNOSIS — Z23 NEED FOR VACCINATION: ICD-10-CM

## 2023-03-22 DIAGNOSIS — Z11.3 SCREENING EXAMINATION FOR SEXUALLY TRANSMITTED DISEASE: ICD-10-CM

## 2023-03-22 DIAGNOSIS — Z13.79 GENETIC SCREENING: ICD-10-CM

## 2023-03-22 DIAGNOSIS — Z11.59 NEED FOR HEPATITIS C SCREENING TEST: ICD-10-CM

## 2023-03-22 DIAGNOSIS — Z11.51 SCREENING FOR HPV (HUMAN PAPILLOMAVIRUS): ICD-10-CM

## 2023-03-22 DIAGNOSIS — Z12.4 SCREENING FOR CERVICAL CANCER: ICD-10-CM

## 2023-03-22 DIAGNOSIS — F33.2 SEVERE EPISODE OF RECURRENT MAJOR DEPRESSIVE DISORDER, WITHOUT PSYCHOTIC FEATURES (HCC): ICD-10-CM

## 2023-03-22 PROCEDURE — 87591 N.GONORRHOEAE DNA AMP PROB: CPT

## 2023-03-22 PROCEDURE — 99395 PREV VISIT EST AGE 18-39: CPT

## 2023-03-22 PROCEDURE — 99214 OFFICE O/P EST MOD 30 MIN: CPT | Mod: 25

## 2023-03-22 PROCEDURE — 88175 CYTOPATH C/V AUTO FLUID REDO: CPT

## 2023-03-22 PROCEDURE — 87624 HPV HI-RISK TYP POOLED RSLT: CPT

## 2023-03-22 PROCEDURE — 87491 CHLMYD TRACH DNA AMP PROBE: CPT

## 2023-03-22 ASSESSMENT — PATIENT HEALTH QUESTIONNAIRE - PHQ9
CLINICAL INTERPRETATION OF PHQ2 SCORE: 4
5. POOR APPETITE OR OVEREATING: 3 - NEARLY EVERY DAY
SUM OF ALL RESPONSES TO PHQ QUESTIONS 1-9: 23

## 2023-03-22 NOTE — ASSESSMENT & PLAN NOTE
Chronic, unstable. Recently restarted her lexapro, and has had improved symptoms since starting this. Requesting referral for counseling. Denies SI or self harm, has transient thoughts but denies plan or intention.    Depression Screening  Patient Health Questionnaire Score: 23   20-27 Severe Depression    Ref psychology  Continue lexapro 10 mg daily

## 2023-03-22 NOTE — LETTER
[facility]  [address]  [place] [address]     March 22, 2023    Patient: Catherine Porter   YOB: 1985   Date of Visit: 3/22/2023       To Whom It May Concern:    Catherine Porter was seen and treated in our department on 3/22/2023.     Sincerely,         RAFI Cruz.

## 2023-03-22 NOTE — PROGRESS NOTES
Subjective:     CC:   Chief Complaint   Patient presents with    Gynecologic Exam     PAP       HPI:   Catherine Porter is a 37 y.o. female who presents for annual exam. She complains of depression, shortness of breath with exertion, vaginal infection and pelvic pain for which she has not started medication for. Was recently seen by PCP Dr. Lamb, who has ordered additional labs, which she has not done yet. Verbalizes she would prefer to have female provider if possible.     Ob-Gyn/ History:    Patient has GYN provider: no  /Para:  22  Last Pap Smear:  . Positive history of abnormal pap smears. Precancerous cells  Gyn Surgery:  excision of vulva ,  tubal ligation.  Current Contraceptive Method:  tubal ligation. is currently sexually active with spouse  Last menstrual period:  2023.  Periods regular. moderate, heavy bleeding. Cramping is moderate, severe.   She does take OTC analgesics for cramps.  No significant bloating/fluid retention, pelvic pain, or dyspareunia. No vaginal discharge. Had pelvic pain, swab indicated vaginal pathology, was prescribed flagyl, which she will  today  Urinary incontinence: no    Health Maintenance  Diet: elicits eating a variety of fresh fruits and veggies most days, eats a variety of meats. Fast food: 1-2 times weekly   Exercise: Intends to start exercising. BMI 41.19, discussed nutritionist and weight loss options.   Substance Abuse: no   Safe in relationship. Yes/s.o.  Seat belts, bike helmet, gun safety discussed.  Sun protection used.    Cancer screening  Cervical Cancer Screening: 3/22/23   Breast Cancer Screening: ordered 3/22/23. Multiple family history of breast cancer     Infectious disease screening/Immunizations  --STI Screening: ordered 3/22/23   --Practices safe sex. Single partner last 6 years  --HIV Screening: done  negative  --Hepatitis C Screening: ordered  3/22/23  --Immunizations:    Influenza:  3/22/23   Tetanus: 11/26/23 due    COVID-19: has had 3 doses, discussed due for booster  Other immunizations: discussed hep b, she is receptive    She  has a past medical history of Hyperlipidemia LDL goal < 130 (7/30/2015).    She has no past medical history of Clotting disorder (HCC).  She  has a past surgical history that includes gyn surgery (primary c section); repeat c section (12/23/2013); wide excision (12/20/2018); and tubal ligation.    Family History   Problem Relation Age of Onset    Hypertension Mother     Arthritis Mother     Diabetes Father     Heart Disease Father     Heart Attack Father     Breast Cancer Maternal Aunt     Breast Cancer Maternal Grandmother     Cancer Maternal Grandmother         breast       Social History     Socioeconomic History    Marital status:      Spouse name: Not on file    Number of children: Not on file    Years of education: Not on file    Highest education level: Some college, no degree   Occupational History    Not on file   Tobacco Use    Smoking status: Never    Smokeless tobacco: Never   Vaping Use    Vaping Use: Never used   Substance and Sexual Activity    Alcohol use: Yes     Comment: 4/month or less    Drug use: No    Sexual activity: Yes     Partners: Male     Birth control/protection: Surgical     Comment: tubes are tied   Other Topics Concern    Not on file   Social History Narrative    Not on file     Social Determinants of Health     Financial Resource Strain: Medium Risk    Difficulty of Paying Living Expenses: Somewhat hard   Food Insecurity: Unknown    Worried About Running Out of Food in the Last Year: Patient refused    Ran Out of Food in the Last Year: Patient refused   Transportation Needs: No Transportation Needs    Lack of Transportation (Medical): No    Lack of Transportation (Non-Medical): No   Physical Activity: Inactive    Days of Exercise per Week: 0 days    Minutes of Exercise per Session: 0 min   Stress: Stress Concern Present     Feeling of Stress : Very much   Social Connections: Moderately Integrated    Frequency of Communication with Friends and Family: Three times a week    Frequency of Social Gatherings with Friends and Family: Never    Attends Pent[employer]ism Services: Never    Active Member of Clubs or Organizations: Yes    Attends Club or Organization Meetings: More than 4 times per year    Marital Status: Living with partner   Intimate Partner Violence: Not on file   Housing Stability: Low Risk     Unable to Pay for Housing in the Last Year: No    Number of Places Lived in the Last Year: 1    Unstable Housing in the Last Year: No       Patient Active Problem List    Diagnosis Date Noted    Severe episode of recurrent major depressive disorder, without psychotic features ([facility]) 03/22/2023    MARY on CPAP 03/20/2023    Morbid obesity ([facility]) 03/20/2023    Prediabetes 02/06/2019    Dyslipidemia 02/06/2019    Vitamin D deficiency 08/27/2015    Tension headache 08/27/2015    Anxiety 07/16/2015         Current Outpatient Medications   Medication Sig Dispense Refill    metroNIDAZOLE (FLAGYL) 500 MG Tab Take 1 Tablet by mouth 2 times a day for 7 days. 14 Tablet 0    escitalopram (LEXAPRO) 10 MG Tab Take 1 Tablet by mouth every day. 90 Tablet 0    EQ ALLERGY RELIEF 180 MG tablet Take 1 tablet by mouth once daily 90 Tablet 0    IBUPROFEN PO Take  by mouth.       No current facility-administered medications for this visit.     No Known Allergies    Review of Systems   Constitutional: Negative for fever, chills and malaise/fatigue.   HENT: Negative for congestion.    Eyes: Negative for pain.    Respiratory: Negative for cough. Elicits she has recently felt short of breath with exertion. MARY on CPAP.   Cardiovascular: Negative for leg swelling.   Gastrointestinal: Negative for nausea, vomiting, abdominal pain and diarrhea.   Genitourinary: Negative for dysuria and hematuria. Has had pelvic/back pain, recently tested positive for BV, has not started  "antibiotic.   Skin: Negative for rash.   Neurological: Negative for dizziness, focal weakness and headaches.   Endo/Heme/Allergies: Does not bleed easily.   Psychiatric/Behavioral: Positive for depression    Objective:     /84 (BP Location: Left arm, Patient Position: Sitting, BP Cuff Size: Large adult)   Pulse 82   Temp 36.3 °C (97.4 °F) (Temporal)   Ht 1.702 m (5' 7\")   Wt 119 kg (263 lb)   SpO2 97%   BMI 41.19 kg/m²   Body mass index is 41.19 kg/m².  Wt Readings from Last 4 Encounters:   03/22/23 119 kg (263 lb)   03/20/23 119 kg (263 lb)   07/20/22 117 kg (259 lb)   03/18/22 118 kg (260 lb)       Physical Exam:  Constitutional: Well-developed and well-nourished. Not diaphoretic. No distress. Obese.   Skin: Skin is warm and dry. No rash noted.  Head: Atraumatic without lesions.  Eyes: Conjunctivae and extraocular motions are normal. Pupils are equal, round, and reactive to light. No scleral icterus.   Ears:  External ears unremarkable. Tympanic membranes clear and intact.  Nose: mask in place  Mouth/Throat: mask in place  Neck: Supple, trachea midline. Normal range of motion. No thyromegaly present. No lymphadenopathy--cervical or supraclavicular.  Cardiovascular: Regular rate and rhythm, S1 and S2 without murmur, rubs, or gallops.  Lungs: Normal inspiratory effort, CTA bilaterally, no wheezes/rhonchi/rales  Abdomen: Soft, obese, and without distention. Active bowel sounds in all four quadrants. Positive for tenderness to lower abdomen with palpation.  :Perineum and external genitalia normal without rash. Vagina with copious, white, yellow, and odorless discharge. Cervix with visible lesions, multiple erythematous canker appearing ulcers to cervix, friable with sweep, and positive tenderness with Bimanual exam . No palpable adnexal masses.  Extremities: No cyanosis, clubbing, erythema, nor edema. Distal pulses intact and symmetric.   Musculoskeletal: All major joints AROM full in all directions " without pain.  Neurological: Alert and oriented x 3. No cranial nerve deficit. 5/5 myotomes. Sensation intact.   Psychiatric:  Behavior, mood, and affect are appropriate.    A chaperone was offered to the patient during today's exam. Chaperone name: Cheyanne Noriega MA was present.    Assessment and Plan:     Problem List Items Addressed This Visit       Severe episode of recurrent major depressive disorder, without psychotic features (HCC)     Chronic, unstable. Recently restarted her lexapro, and has had improved symptoms since starting this. Requesting referral for counseling. Denies SI or self harm, has transient thoughts but denies plan or intention.    Depression Screening  Patient Health Questionnaire Score: 23   20-27 Severe Depression    Ref psychology  Continue lexapro 10 mg daily         Relevant Orders    Referral to Psychology     Other Visit Diagnoses       Family history of breast cancer        Relevant Orders    MA-SCREENING MAMMO BILAT W/TOMOSYNTHESIS W/CAD    Screening examination for sexually transmitted disease        Relevant Orders    Chlamydia/GC, PCR (Genital/Anal swab)    Screening for cervical cancer        Screening for HPV (human papillomavirus)        Relevant Orders    Chlamydia/GC, PCR (Genital/Anal swab)    THINPREP PAP WITH HPV    Need for hepatitis C screening test        Relevant Orders    HEP C VIRUS ANTIBODY    Need for vaccination        Relevant Orders    Hep B Adult 20+    Genetic screening        Relevant Orders    Referral to Genetic Research Studies             HCM:  completed   Labs per orders  Immunizations per orders  Patient counseled about skin care, diet, supplements, prenatal vitamins, safe sex and exercise.    I have placed the below orders and discussed them with an approved delegating provider.  The MA is performing the below orders under the direction of Dr. Bennett.    I spent a total of 25 minutes with record review, exam, communication with the patient,  communication with other providers, and documentation of this encounter.    Return in about 4 weeks (around 4/19/2023), or if symptoms worsen or fail to improve, for Labs.    Please note that this dictation was created using voice recognition software. I have made every reasonable attempt to correct obvious errors, but I expect that there are errors of grammar and possibly content that I did not discover before finalizing the note.

## 2023-03-23 LAB
C TRACH DNA GENITAL QL NAA+PROBE: NEGATIVE
CYTOLOGY REG CYTOL: NORMAL
HPV HR 12 DNA CVX QL NAA+PROBE: NEGATIVE
HPV16 DNA SPEC QL NAA+PROBE: NEGATIVE
HPV18 DNA SPEC QL NAA+PROBE: NEGATIVE
N GONORRHOEA DNA GENITAL QL NAA+PROBE: NEGATIVE
SPECIMEN SOURCE: NORMAL
SPECIMEN SOURCE: NORMAL

## 2023-03-24 ENCOUNTER — HOSPITAL ENCOUNTER (OUTPATIENT)
Dept: LAB | Facility: MEDICAL CENTER | Age: 38
End: 2023-03-24
Attending: INTERNAL MEDICINE
Payer: COMMERCIAL

## 2023-03-24 DIAGNOSIS — R06.02 SOB (SHORTNESS OF BREATH) ON EXERTION: ICD-10-CM

## 2023-03-24 DIAGNOSIS — Z13.21 ENCOUNTER FOR VITAMIN DEFICIENCY SCREENING: ICD-10-CM

## 2023-03-24 DIAGNOSIS — G47.33 OSA ON CPAP: ICD-10-CM

## 2023-03-24 DIAGNOSIS — R73.03 PREDIABETES: ICD-10-CM

## 2023-03-24 DIAGNOSIS — E66.01 MORBID OBESITY (HCC): ICD-10-CM

## 2023-03-24 DIAGNOSIS — E55.9 VITAMIN D DEFICIENCY: ICD-10-CM

## 2023-03-24 DIAGNOSIS — E78.5 DYSLIPIDEMIA: ICD-10-CM

## 2023-03-24 DIAGNOSIS — N92.0 MENORRHAGIA WITH REGULAR CYCLE: ICD-10-CM

## 2023-03-24 DIAGNOSIS — R35.0 URINE FREQUENCY: ICD-10-CM

## 2023-03-24 DIAGNOSIS — F41.9 ANXIETY: ICD-10-CM

## 2023-03-24 LAB
25(OH)D3 SERPL-MCNC: 14 NG/ML (ref 30–100)
ALBUMIN SERPL BCP-MCNC: 4.2 G/DL (ref 3.2–4.9)
ALBUMIN/GLOB SERPL: 1.2 G/DL
ALP SERPL-CCNC: 64 U/L (ref 30–99)
ALT SERPL-CCNC: 22 U/L (ref 2–50)
ANION GAP SERPL CALC-SCNC: 11 MMOL/L (ref 7–16)
APPEARANCE UR: CLEAR
AST SERPL-CCNC: 18 U/L (ref 12–45)
BACTERIA #/AREA URNS HPF: NEGATIVE /HPF
BASOPHILS # BLD AUTO: 0.5 % (ref 0–1.8)
BASOPHILS # BLD: 0.04 K/UL (ref 0–0.12)
BILIRUB SERPL-MCNC: 0.3 MG/DL (ref 0.1–1.5)
BILIRUB UR QL STRIP.AUTO: NEGATIVE
BUN SERPL-MCNC: 8 MG/DL (ref 8–22)
CALCIUM ALBUM COR SERPL-MCNC: 9.1 MG/DL (ref 8.5–10.5)
CALCIUM SERPL-MCNC: 9.3 MG/DL (ref 8.5–10.5)
CHLORIDE SERPL-SCNC: 103 MMOL/L (ref 96–112)
CHOLEST SERPL-MCNC: 251 MG/DL (ref 100–199)
CO2 SERPL-SCNC: 23 MMOL/L (ref 20–33)
COLOR UR: YELLOW
CREAT SERPL-MCNC: 0.52 MG/DL (ref 0.5–1.4)
EOSINOPHIL # BLD AUTO: 0.17 K/UL (ref 0–0.51)
EOSINOPHIL NFR BLD: 2.3 % (ref 0–6.9)
EPI CELLS #/AREA URNS HPF: ABNORMAL /HPF
ERYTHROCYTE [DISTWIDTH] IN BLOOD BY AUTOMATED COUNT: 41.8 FL (ref 35.9–50)
EST. AVERAGE GLUCOSE BLD GHB EST-MCNC: 120 MG/DL
FERRITIN SERPL-MCNC: 20.8 NG/ML (ref 10–291)
GFR SERPLBLD CREATININE-BSD FMLA CKD-EPI: 121 ML/MIN/1.73 M 2
GLOBULIN SER CALC-MCNC: 3.6 G/DL (ref 1.9–3.5)
GLUCOSE SERPL-MCNC: 101 MG/DL (ref 65–99)
GLUCOSE UR STRIP.AUTO-MCNC: NEGATIVE MG/DL
HBA1C MFR BLD: 5.8 % (ref 4–5.6)
HCT VFR BLD AUTO: 42.9 % (ref 37–47)
HDLC SERPL-MCNC: 52 MG/DL
HGB BLD-MCNC: 13.5 G/DL (ref 12–16)
HYALINE CASTS #/AREA URNS LPF: ABNORMAL /LPF
IMM GRANULOCYTES # BLD AUTO: 0.04 K/UL (ref 0–0.11)
IMM GRANULOCYTES NFR BLD AUTO: 0.5 % (ref 0–0.9)
IRON SATN MFR SERPL: 14 % (ref 15–55)
IRON SERPL-MCNC: 55 UG/DL (ref 40–170)
KETONES UR STRIP.AUTO-MCNC: NEGATIVE MG/DL
LDLC SERPL CALC-MCNC: 170 MG/DL
LEUKOCYTE ESTERASE UR QL STRIP.AUTO: ABNORMAL
LYMPHOCYTES # BLD AUTO: 2.14 K/UL (ref 1–4.8)
LYMPHOCYTES NFR BLD: 28.6 % (ref 22–41)
MCH RBC QN AUTO: 27.1 PG (ref 27–33)
MCHC RBC AUTO-ENTMCNC: 31.5 G/DL (ref 33.6–35)
MCV RBC AUTO: 86.1 FL (ref 81.4–97.8)
MICRO URNS: ABNORMAL
MONOCYTES # BLD AUTO: 0.42 K/UL (ref 0–0.85)
MONOCYTES NFR BLD AUTO: 5.6 % (ref 0–13.4)
NEUTROPHILS # BLD AUTO: 4.66 K/UL (ref 2–7.15)
NEUTROPHILS NFR BLD: 62.5 % (ref 44–72)
NITRITE UR QL STRIP.AUTO: NEGATIVE
NRBC # BLD AUTO: 0 K/UL
NRBC BLD-RTO: 0 /100 WBC
PH UR STRIP.AUTO: 6 [PH] (ref 5–8)
PLATELET # BLD AUTO: 289 K/UL (ref 164–446)
PMV BLD AUTO: 9.7 FL (ref 9–12.9)
POTASSIUM SERPL-SCNC: 3.8 MMOL/L (ref 3.6–5.5)
PROT SERPL-MCNC: 7.8 G/DL (ref 6–8.2)
PROT UR QL STRIP: NEGATIVE MG/DL
RBC # BLD AUTO: 4.98 M/UL (ref 4.2–5.4)
RBC # URNS HPF: ABNORMAL /HPF
RBC UR QL AUTO: NEGATIVE
SODIUM SERPL-SCNC: 137 MMOL/L (ref 135–145)
SP GR UR STRIP.AUTO: 1.01
T3FREE SERPL-MCNC: 2.52 PG/ML (ref 2–4.4)
T4 FREE SERPL-MCNC: 0.98 NG/DL (ref 0.93–1.7)
TIBC SERPL-MCNC: 400 UG/DL (ref 250–450)
TRIGL SERPL-MCNC: 145 MG/DL (ref 0–149)
TSH SERPL DL<=0.005 MIU/L-ACNC: 2.94 UIU/ML (ref 0.38–5.33)
UIBC SERPL-MCNC: 345 UG/DL (ref 110–370)
UROBILINOGEN UR STRIP.AUTO-MCNC: 0.2 MG/DL
VIT B12 SERPL-MCNC: 524 PG/ML (ref 211–911)
WBC # BLD AUTO: 7.5 K/UL (ref 4.8–10.8)
WBC #/AREA URNS HPF: ABNORMAL /HPF

## 2023-03-24 PROCEDURE — 87086 URINE CULTURE/COLONY COUNT: CPT

## 2023-03-24 PROCEDURE — 82607 VITAMIN B-12: CPT

## 2023-03-24 PROCEDURE — 82728 ASSAY OF FERRITIN: CPT

## 2023-03-24 PROCEDURE — 83036 HEMOGLOBIN GLYCOSYLATED A1C: CPT

## 2023-03-24 PROCEDURE — 82306 VITAMIN D 25 HYDROXY: CPT

## 2023-03-24 PROCEDURE — 80053 COMPREHEN METABOLIC PANEL: CPT

## 2023-03-24 PROCEDURE — 85025 COMPLETE CBC W/AUTO DIFF WBC: CPT

## 2023-03-24 PROCEDURE — 83550 IRON BINDING TEST: CPT

## 2023-03-24 PROCEDURE — 80061 LIPID PANEL: CPT

## 2023-03-24 PROCEDURE — 81001 URINALYSIS AUTO W/SCOPE: CPT

## 2023-03-24 PROCEDURE — 83540 ASSAY OF IRON: CPT

## 2023-03-24 PROCEDURE — 84439 ASSAY OF FREE THYROXINE: CPT

## 2023-03-24 PROCEDURE — 84443 ASSAY THYROID STIM HORMONE: CPT

## 2023-03-24 PROCEDURE — 84481 FREE ASSAY (FT-3): CPT

## 2023-03-24 PROCEDURE — 36415 COLL VENOUS BLD VENIPUNCTURE: CPT

## 2023-03-26 LAB
BACTERIA UR CULT: NORMAL
SIGNIFICANT IND 70042: NORMAL
SITE SITE: NORMAL
SOURCE SOURCE: NORMAL

## 2023-03-28 ENCOUNTER — HOSPITAL ENCOUNTER (OUTPATIENT)
Dept: RADIOLOGY | Facility: MEDICAL CENTER | Age: 38
End: 2023-03-28
Payer: COMMERCIAL

## 2023-03-28 DIAGNOSIS — Z80.3 FAMILY HISTORY OF BREAST CANCER: ICD-10-CM

## 2023-03-28 PROCEDURE — 77063 BREAST TOMOSYNTHESIS BI: CPT

## 2023-03-29 DIAGNOSIS — F41.9 ANXIETY: ICD-10-CM

## 2023-03-29 RX ORDER — ESCITALOPRAM OXALATE 10 MG/1
10 TABLET ORAL DAILY
Qty: 90 TABLET | Refills: 3 | Status: SHIPPED | OUTPATIENT
Start: 2023-03-29

## 2023-03-31 ENCOUNTER — HOSPITAL ENCOUNTER (OUTPATIENT)
Dept: RADIOLOGY | Facility: MEDICAL CENTER | Age: 38
End: 2023-03-31
Payer: COMMERCIAL

## 2023-03-31 ENCOUNTER — RESEARCH ENCOUNTER (OUTPATIENT)
Dept: RESEARCH | Facility: WORKSITE | Age: 38
End: 2023-03-31
Payer: COMMERCIAL

## 2023-03-31 DIAGNOSIS — R92.8 ABNORMAL MAMMOGRAM: ICD-10-CM

## 2023-03-31 DIAGNOSIS — Z00.6 RESEARCH STUDY PATIENT: ICD-10-CM

## 2023-03-31 PROCEDURE — 76642 ULTRASOUND BREAST LIMITED: CPT | Mod: LT

## 2023-03-31 PROCEDURE — G0279 TOMOSYNTHESIS, MAMMO: HCPCS

## 2023-04-04 ENCOUNTER — OFFICE VISIT (OUTPATIENT)
Dept: MEDICAL GROUP | Facility: PHYSICIAN GROUP | Age: 38
End: 2023-04-04
Payer: COMMERCIAL

## 2023-04-04 VITALS
RESPIRATION RATE: 12 BRPM | SYSTOLIC BLOOD PRESSURE: 130 MMHG | DIASTOLIC BLOOD PRESSURE: 78 MMHG | TEMPERATURE: 97.4 F | BODY MASS INDEX: 40.27 KG/M2 | HEIGHT: 67 IN | WEIGHT: 256.6 LBS | HEART RATE: 78 BPM | OXYGEN SATURATION: 97 %

## 2023-04-04 DIAGNOSIS — F33.2 SEVERE EPISODE OF RECURRENT MAJOR DEPRESSIVE DISORDER, WITHOUT PSYCHOTIC FEATURES (HCC): ICD-10-CM

## 2023-04-04 DIAGNOSIS — R73.03 PREDIABETES: ICD-10-CM

## 2023-04-04 DIAGNOSIS — Z23 NEED FOR VACCINATION: ICD-10-CM

## 2023-04-04 DIAGNOSIS — E55.9 VITAMIN D DEFICIENCY: ICD-10-CM

## 2023-04-04 DIAGNOSIS — E78.5 DYSLIPIDEMIA: ICD-10-CM

## 2023-04-04 PROCEDURE — 99214 OFFICE O/P EST MOD 30 MIN: CPT | Mod: 25

## 2023-04-04 PROCEDURE — 90471 IMMUNIZATION ADMIN: CPT

## 2023-04-04 PROCEDURE — 90746 HEPB VACCINE 3 DOSE ADULT IM: CPT

## 2023-04-04 RX ORDER — METFORMIN HYDROCHLORIDE 500 MG/1
500 TABLET, EXTENDED RELEASE ORAL DAILY
Qty: 90 TABLET | Refills: 3 | Status: SHIPPED | OUTPATIENT
Start: 2023-04-04

## 2023-04-04 RX ORDER — ERGOCALCIFEROL 1.25 MG/1
50000 CAPSULE ORAL
Qty: 12 CAPSULE | Refills: 0 | Status: SHIPPED | OUTPATIENT
Start: 2023-04-04

## 2023-04-04 ASSESSMENT — FIBROSIS 4 INDEX: FIB4 SCORE: 0.52

## 2023-04-04 NOTE — PROGRESS NOTES
"Subjective:     CC: Diagnoses of Need for vaccination, Prediabetes, Dyslipidemia, Vitamin D deficiency, and Severe episode of recurrent major depressive disorder, without psychotic features (HCC) were pertinent to this visit.    Reports vaginal discharge and pelvic pain has resolved from prior visit, finished antibiotic course as prescribed.   Reviewed Mammogram/follow up US results   Reviewed PAP results.    HPI:   Catherine presents today with    Problem   Severe Episode of Recurrent Major Depressive Disorder, Without Psychotic Features (Hcc)   Prediabetes   Dyslipidemia   Vitamin D Deficiency     ROS:  Review of Systems   All other systems reviewed and are negative.    Objective:     Exam:  /78   Pulse 78   Temp 36.3 °C (97.4 °F) (Temporal)   Resp 12   Ht 1.702 m (5' 7\")   Wt 116 kg (256 lb 9.6 oz)   SpO2 97%   BMI 40.19 kg/m²  Body mass index is 40.19 kg/m².    Physical Exam  Vitals reviewed.   Constitutional:       General: She is not in acute distress.     Appearance: She is obese. She is not ill-appearing.   HENT:      Head: Normocephalic and atraumatic.   Cardiovascular:      Rate and Rhythm: Normal rate and regular rhythm.      Pulses: Normal pulses.      Heart sounds: Normal heart sounds.   Pulmonary:      Effort: Pulmonary effort is normal. No respiratory distress.   Abdominal:      General: Bowel sounds are normal.   Skin:     General: Skin is warm and dry.      Capillary Refill: Capillary refill takes less than 2 seconds.   Neurological:      General: No focal deficit present.      Mental Status: She is alert and oriented to person, place, and time.   Psychiatric:         Mood and Affect: Mood normal.         Behavior: Behavior normal.       Labs:    Latest Reference Range & Units 03/20/23 13:48 03/22/23 10:30 03/24/23 06:59 03/28/23 13:47 03/31/23 08:57 03/31/23 09:21   WBC 4.8 - 10.8 K/uL   7.5      RBC 4.20 - 5.40 M/uL   4.98      Hemoglobin 12.0 - 16.0 g/dL   13.5      Hematocrit 37.0 - " 47.0 %   42.9      MCV 81.4 - 97.8 fL   86.1      MCH 27.0 - 33.0 pg   27.1      MCHC 33.6 - 35.0 g/dL   31.5 (L)      RDW 35.9 - 50.0 fL   41.8      Platelet Count 164 - 446 K/uL   289      MPV 9.0 - 12.9 fL   9.7      Neutrophils-Polys 44.00 - 72.00 %   62.50      Neutrophils (Absolute) 2.00 - 7.15 K/uL   4.66      Lymphocytes 22.00 - 41.00 %   28.60      Lymphs (Absolute) 1.00 - 4.80 K/uL   2.14      Monocytes 0.00 - 13.40 %   5.60      Monos (Absolute) 0.00 - 0.85 K/uL   0.42      Eosinophils 0.00 - 6.90 %   2.30      Eos (Absolute) 0.00 - 0.51 K/uL   0.17      Basophils 0.00 - 1.80 %   0.50      Baso (Absolute) 0.00 - 0.12 K/uL   0.04      Immature Granulocytes 0.00 - 0.90 %   0.50      Immature Granulocytes (abs) 0.00 - 0.11 K/uL   0.04      Nucleated RBC /100 WBC   0.00      NRBC (Absolute) K/uL   0.00      Sodium 135 - 145 mmol/L   137      Potassium 3.6 - 5.5 mmol/L   3.8      Chloride 96 - 112 mmol/L   103      Co2 20 - 33 mmol/L   23      Anion Gap 7.0 - 16.0    11.0      Glucose 65 - 99 mg/dL   101 (H)      Bun 8 - 22 mg/dL   8      Creatinine 0.50 - 1.40 mg/dL   0.52      GFR (CKD-EPI) >60 mL/min/1.73 m 2   121      Calcium 8.5 - 10.5 mg/dL   9.3      Correct Calcium 8.5 - 10.5 mg/dL   9.1      AST(SGOT) 12 - 45 U/L   18      ALT(SGPT) 2 - 50 U/L   22      Alkaline Phosphatase 30 - 99 U/L   64      Total Bilirubin 0.1 - 1.5 mg/dL   0.3      Albumin 3.2 - 4.9 g/dL   4.2      Total Protein 6.0 - 8.2 g/dL   7.8      Globulin 1.9 - 3.5 g/dL   3.6 (H)      A-G Ratio g/dL   1.2      Iron 40 - 170 ug/dL   55      Total Iron Binding 250 - 450 ug/dL   400      % Saturation 15 - 55 %   14 (L)      Unsat Iron Binding 110 - 370 ug/dL   345      Glycohemoglobin 4.0 - 5.6 %   5.8 (H)      Estim. Avg Glu mg/dL   120      Cholesterol,Tot 100 - 199 mg/dL   251 (H)      Triglycerides 0 - 149 mg/dL   145      HDL >=40 mg/dL   52      LDL <100 mg/dL   170 (H)      Urobilinogen, Urine Negative    0.2      Color    Yellow       Character    Clear      Specific Gravity <1.035    1.015      Ph 5.0 - 8.0    6.0      Glucose Negative mg/dL   Negative      Ketones Negative mg/dL   Negative      Bilirubin Negative    Negative      Occult Blood Negative    Negative      Protein Negative mg/dL   Negative      Nitrite Negative    Negative      Leukocyte Esterase Negative    Large !      Micro Urine Req    Microscopic      WBC /hpf   5-10 !      RBC /hpf   0-2      Epithelial Cells /hpf   Few      Bacteria None /hpf   Negative      Hyaline Cast /lpf   0-2      25-Hydroxy   Vitamin D 25 30 - 100 ng/mL   14 (L)      Ferritin 10.0 - 291.0 ng/mL   20.8      Vitamin B12 -True Cobalamin 211 - 911 pg/mL   524      TSH 0.380 - 5.330 uIU/mL   2.940      Free T-4 0.93 - 1.70 ng/dL   0.98      T3,Free 2.00 - 4.40 pg/mL   2.52      HPV Genotype 16 Negative   Negative       HPV Genotype 18 Negative   Negative       HPV Other High Risk Genotypes Negative   Negative       Source   Cervical       PATHOLOGY GYNECOLOGY SPECIMEN   Rpt       Cytology Reg   See Path Report       C. trachomatis by PCR Negative   Negative       Candida species DNA Probe Negative  Negative        Gardnerella vaginalis DNA Probe Negative  POSITIVE !        N. gonorrhoeae by PCR Negative   Negative       Significant Indicator    NEG      Site    -      Source    UR      Source   Genital       Trichamonas vaginalis DNA Probe Negative  Negative        MA-SCREENING MAMMO BILAT W/TOMOSYNTHESIS W/CAD     Rpt     US-BREAST LIMITED-LEFT       Rpt   MA-DIAGNOSTIC MAMMO LEFT W/TOMOSYNTHESIS W/O CAD      Rpt    (L): Data is abnormally low  (H): Data is abnormally high  !: Data is abnormal  Rpt: View report in Results Review for more information    Assessment & Plan:     37 y.o. female with the following -     Problem List Items Addressed This Visit       Vitamin D deficiency     Chronic, unstable. Will provide ergocalciferol 75126 iu once weekly for 12 weeks, followed by once daily vit d3 otc paired  with calcium to optimize absorption.           Relevant Medications    ergocalciferol (DRISDOL) 68304 UNIT capsule    Prediabetes     Chronic, unstable. Discussed healthy lifestyle recommendations. Has been prediabetic for several years.  Family history of DM -multiple family members  Metformin 500  Plan to recheck 6 months         Relevant Medications    metFORMIN ER (GLUCOPHAGE XR) 500 MG TABLET SR 24 HR    Dyslipidemia     Chronic, unstable. Discussed healthy lifestyle recommendations.   The ASCVD Risk score (Giancarlo DK, et al., 2019) failed to calculate.           Severe episode of recurrent major depressive disorder, without psychotic features (HCC)     Chronic, improving. Reports she is feeling better currently from our previous visit. Reports she is utilzing EAP free counseling sessions, and then will establish with psychology as needed. Denies SI/self harm.  Continue lexapro 10 mg daily  Discussed ER precautions  Follow up for worsening symptoms          Other Visit Diagnoses       Need for vaccination        Relevant Orders    Hep B Adult 20+ (Completed)          Patient was educated in proper administration of medication(s) ordered today including safety, possible SE, risks, benefits, rationale and alternatives to therapy.   Supportive care, differential diagnoses, and indications for immediate follow-up discussed with patient.    Pathogenesis of diagnosis discussed including typical length and natural progression.    Instructed to return to clinic or nearest emergency department for any change in condition, further concerns, or worsening of symptoms.  Patient states understanding of the plan of care and discharge instructions.    I have placed the below orders and discussed them with an approved delegating provider.  The MA is performing the below orders under the direction of Dr. Bennett.    I spent a total of 25 minutes with record review, exam, communication with the patient, communication with other  providers, and documentation of this encounter.    Return in about 6 months (around 10/4/2023) for A1C.    Please note that this dictation was created using voice recognition software. I have made every reasonable attempt to correct obvious errors, but I expect that there are errors of grammar and possibly content that I did not discover before finalizing the note.

## 2023-04-04 NOTE — ASSESSMENT & PLAN NOTE
Chronic, unstable. Discussed healthy lifestyle recommendations.   The ASCVD Risk score (Giancarlo MA, et al., 2019) failed to calculate.

## 2023-04-04 NOTE — ASSESSMENT & PLAN NOTE
Chronic, unstable. Discussed healthy lifestyle recommendations. Has been prediabetic for several years.  Family history of DM -multiple family members  Metformin 500  Plan to recheck 6 months

## 2023-04-04 NOTE — LETTER
[facility]  [address]  [place] [id number]     April 4, 2023    Patient: Catherine Porter   YOB: 1985   Date of Visit: 4/4/2023       To Whom It May Concern:    Catherine Porter was seen and treated in our department on 4/4/2023. She was additionally seen 3/22/23 for women well check, and 3/20/23 to establish care. Health maintenance completed, including labs, mammogram, pap smear, and vaccines up to date.     Sincerely,         RAFI Cruz.

## 2023-04-04 NOTE — ASSESSMENT & PLAN NOTE
Chronic, improving. Reports she is feeling better currently from our previous visit. Reports she is utilzing EAP free counseling sessions, and then will establish with psychology as needed. Denies SI/self harm.  Continue lexapro 10 mg daily  Discussed ER precautions  Follow up for worsening symptoms

## 2023-05-30 LAB
APOB+LDLR+PCSK9 GENE MUT ANL BLD/T: NOT DETECTED
BRCA1+BRCA2 DEL+DUP + FULL MUT ANL BLD/T: NOT DETECTED
MLH1+MSH2+MSH6+PMS2 GN DEL+DUP+FUL M: NOT DETECTED

## 2023-09-15 ENCOUNTER — PATIENT MESSAGE (OUTPATIENT)
Dept: MEDICAL GROUP | Facility: PHYSICIAN GROUP | Age: 38
End: 2023-09-15
Payer: COMMERCIAL

## 2023-09-15 DIAGNOSIS — G44.209 TENSION HEADACHE: ICD-10-CM

## 2023-09-15 RX ORDER — IBUPROFEN 800 MG/1
800 TABLET ORAL 3 TIMES DAILY PRN
COMMUNITY
Start: 2023-07-03 | End: 2023-09-15 | Stop reason: SDUPTHER

## 2023-09-15 RX ORDER — IBUPROFEN 800 MG/1
800 TABLET ORAL 3 TIMES DAILY PRN
Qty: 30 TABLET | Refills: 0 | Status: SHIPPED | OUTPATIENT
Start: 2023-09-15

## 2024-07-12 DIAGNOSIS — F41.9 ANXIETY: ICD-10-CM

## 2024-07-12 DIAGNOSIS — G44.209 TENSION HEADACHE: ICD-10-CM

## 2024-07-12 DIAGNOSIS — R73.03 PREDIABETES: ICD-10-CM

## 2024-07-12 RX ORDER — IBUPROFEN 800 MG/1
800 TABLET ORAL 3 TIMES DAILY PRN
Qty: 30 TABLET | Refills: 0 | Status: SHIPPED | OUTPATIENT
Start: 2024-07-12

## 2024-07-12 RX ORDER — ESCITALOPRAM OXALATE 10 MG/1
10 TABLET ORAL DAILY
Qty: 90 TABLET | Refills: 0 | Status: SHIPPED | OUTPATIENT
Start: 2024-07-12

## 2024-07-12 RX ORDER — METFORMIN HYDROCHLORIDE 500 MG/1
500 TABLET, EXTENDED RELEASE ORAL DAILY
Qty: 90 TABLET | Refills: 0 | Status: SHIPPED | OUTPATIENT
Start: 2024-07-12

## 2024-09-11 ENCOUNTER — APPOINTMENT (OUTPATIENT)
Dept: MEDICAL GROUP | Facility: PHYSICIAN GROUP | Age: 39
End: 2024-09-11
Payer: COMMERCIAL

## 2024-11-04 ENCOUNTER — PHARMACY VISIT (OUTPATIENT)
Dept: PHARMACY | Facility: MEDICAL CENTER | Age: 39
End: 2024-11-04
Payer: COMMERCIAL

## 2024-11-04 ENCOUNTER — OFFICE VISIT (OUTPATIENT)
Dept: URGENT CARE | Facility: CLINIC | Age: 39
End: 2024-11-04
Payer: MEDICAID

## 2024-11-04 ENCOUNTER — APPOINTMENT (OUTPATIENT)
Dept: URGENT CARE | Facility: PHYSICIAN GROUP | Age: 39
End: 2024-11-04
Payer: MEDICAID

## 2024-11-04 VITALS
TEMPERATURE: 97.9 F | WEIGHT: 250 LBS | HEIGHT: 66 IN | DIASTOLIC BLOOD PRESSURE: 80 MMHG | SYSTOLIC BLOOD PRESSURE: 108 MMHG | OXYGEN SATURATION: 97 % | BODY MASS INDEX: 40.18 KG/M2 | RESPIRATION RATE: 18 BRPM | HEART RATE: 72 BPM

## 2024-11-04 DIAGNOSIS — B37.9 YEAST INFECTION: ICD-10-CM

## 2024-11-04 PROCEDURE — RXMED WILLOW AMBULATORY MEDICATION CHARGE: Performed by: NURSE PRACTITIONER

## 2024-11-04 PROCEDURE — 3074F SYST BP LT 130 MM HG: CPT | Performed by: NURSE PRACTITIONER

## 2024-11-04 PROCEDURE — 3079F DIAST BP 80-89 MM HG: CPT | Performed by: NURSE PRACTITIONER

## 2024-11-04 PROCEDURE — 99213 OFFICE O/P EST LOW 20 MIN: CPT | Performed by: NURSE PRACTITIONER

## 2024-11-04 RX ORDER — FLUCONAZOLE 150 MG/1
150 TABLET ORAL DAILY
Qty: 2 TABLET | Refills: 0 | Status: SHIPPED | OUTPATIENT
Start: 2024-11-04 | End: 2024-11-04 | Stop reason: SDUPTHER

## 2024-11-04 RX ORDER — FLUCONAZOLE 150 MG/1
150 TABLET ORAL DAILY
Qty: 2 TABLET | Refills: 0 | Status: SHIPPED | OUTPATIENT
Start: 2024-11-04

## 2024-11-04 RX ORDER — NYSTATIN 100000 U/G
1 CREAM TOPICAL 2 TIMES DAILY
Qty: 30 G | Refills: 0 | Status: SHIPPED | OUTPATIENT
Start: 2024-11-04 | End: 2024-11-04 | Stop reason: SDUPTHER

## 2024-11-04 RX ORDER — NYSTATIN 100000 U/G
1 CREAM TOPICAL 2 TIMES DAILY
Qty: 30 G | Refills: 0 | Status: SHIPPED | OUTPATIENT
Start: 2024-11-04

## 2024-11-04 ASSESSMENT — ENCOUNTER SYMPTOMS
FEVER: 0
FATIGUE: 0
RHINORRHEA: 0

## 2024-11-04 ASSESSMENT — FIBROSIS 4 INDEX: FIB4 SCORE: 0.53

## 2024-11-05 NOTE — PROGRESS NOTES
Subjective:   Catherine Porter is a 38 y.o. female who presents for Rash (Underbelly, fungal rash)      Rash  This is a new problem. The current episode started in the past 7 days. The problem has been gradually worsening since onset. The affected locations include the groin and abdomen. The rash is characterized by redness. She was exposed to nothing. Pertinent negatives include no fatigue, fever or rhinorrhea. Treatments tried: poweders. The treatment provided no relief.       Review of Systems   Constitutional:  Negative for fatigue, fever and malaise/fatigue.   HENT:  Negative for rhinorrhea.    Skin:  Positive for rash. Negative for itching.       Medications:    EQ Allergy Relief Tabs  ergocalciferol  escitalopram Tabs  fluconazole  ibuprofen Tabs  metFORMIN ER Tb24  nystatin Crea    Allergies: Patient has no known allergies.    Problem List: Catherine Porter does not have any pertinent problems on file.    Surgical History:  Past Surgical History:   Procedure Laterality Date    WIDE EXCISION  12/20/2018    Procedure: WIDE EXCISION - LOCAL EXCISION OF VULVA;  Surgeon: Arya Quijano M.D.;  Location: SURGERY SAME DAY [facility];  Service: Gynecology    REPEAT C SECTION  12/23/2013    Performed by Dony Bales M.D. at [facility]    GYN SURGERY  primary c section    2010    TUBAL LIGATION         Past Social Hx: Catherine Porter  reports that she has never smoked. She has never used smokeless tobacco. She reports current alcohol use. She reports that she does not use drugs.     Past Family Hx:  Catherine Porter family history includes Arthritis in her mother; Breast Cancer in her maternal aunt and maternal grandmother; Cancer in her maternal grandmother; Diabetes in her father; Heart Attack in her father; Heart Disease in her father; Hypertension in her mother.     Problem list, medications, and allergies reviewed by myself today in Epic.     Objective:  "    /80   Pulse 72   Temp 36.6 °C (97.9 °F) (Temporal)   Resp 18   Ht 1.676 m (5' 6\")   Wt 113 kg (250 lb)   SpO2 97%   BMI 40.35 kg/m²     Physical Exam  Constitutional:       Appearance: Normal appearance. She is not ill-appearing or toxic-appearing.   HENT:      Head: Normocephalic.      Right Ear: External ear normal.      Left Ear: External ear normal.      Nose: Nose normal.      Mouth/Throat:      Lips: Pink.   Eyes:      General: Lids are normal.   Pulmonary:      Effort: Pulmonary effort is normal. No accessory muscle usage.   Musculoskeletal:      Cervical back: Full passive range of motion without pain.   Skin:     Findings: Rash present.             Comments: Skin erythematous excoriated of the panus groin region.    Neurological:      Mental Status: She is alert and oriented to person, place, and time.   Psychiatric:         Mood and Affect: Mood normal.         Thought Content: Thought content normal.         Assessment/Plan:     Diagnosis and associated orders:     1. Yeast infection  fluconazole (DIFLUCAN) 150 MG tablet    nystatin (MYCOSTATIN) 300607 UNIT/GM Cream topical cream    DISCONTINUED: nystatin (MYCOSTATIN) 025423 UNIT/GM Cream topical cream    DISCONTINUED: fluconazole (DIFLUCAN) 150 MG tablet         Comments/MDM:     I personally reviewed prior external notes and prior test results pertinent to today's visit.  Appears to be fungal encouraged to keep clean and dry  Discussed management options, risks and benefits, and alternatives to treatment plan agreed upon.   Red flags discussed and indications to immediately call 911 or present to the Emergency Department.   Supportive care, differential diagnoses, and indications for immediate follow-up discussed with patient.    Patient expresses understanding and agrees to plan. Patient denies any other questions or concerns.              Please note that this dictation was created using voice recognition software. I have made a " reasonable attempt to correct obvious errors, but I expect that there are errors of grammar and possibly content that I did not discover before finalizing the note.    This note was electronically signed by Panchito DINERO.

## 2024-11-19 ENCOUNTER — APPOINTMENT (OUTPATIENT)
Dept: MEDICAL GROUP | Facility: PHYSICIAN GROUP | Age: 39
End: 2024-11-19
Payer: COMMERCIAL

## 2024-12-04 ENCOUNTER — APPOINTMENT (OUTPATIENT)
Dept: INTERNAL MEDICINE | Facility: OTHER | Age: 39
End: 2024-12-04
Payer: MEDICAID

## 2024-12-10 ENCOUNTER — APPOINTMENT (OUTPATIENT)
Dept: MEDICAL GROUP | Facility: PHYSICIAN GROUP | Age: 39
End: 2024-12-10
Payer: COMMERCIAL

## 2024-12-12 ENCOUNTER — OFFICE VISIT (OUTPATIENT)
Dept: INTERNAL MEDICINE | Facility: OTHER | Age: 39
End: 2024-12-12
Payer: MEDICAID

## 2024-12-12 VITALS
WEIGHT: 239 LBS | BODY MASS INDEX: 38.58 KG/M2 | SYSTOLIC BLOOD PRESSURE: 136 MMHG | OXYGEN SATURATION: 98 % | DIASTOLIC BLOOD PRESSURE: 60 MMHG | HEART RATE: 83 BPM | TEMPERATURE: 97.2 F

## 2024-12-12 DIAGNOSIS — F41.9 ANXIETY: ICD-10-CM

## 2024-12-12 DIAGNOSIS — E55.9 VITAMIN D DEFICIENCY: ICD-10-CM

## 2024-12-12 DIAGNOSIS — F33.1 MDD (MAJOR DEPRESSIVE DISORDER), RECURRENT EPISODE, MODERATE (HCC): ICD-10-CM

## 2024-12-12 DIAGNOSIS — C51.9 VULVAR CANCER (HCC): ICD-10-CM

## 2024-12-12 DIAGNOSIS — Z86.39 HISTORY OF IRON DEFICIENCY: ICD-10-CM

## 2024-12-12 DIAGNOSIS — Z12.31 SCREENING MAMMOGRAM FOR BREAST CANCER: ICD-10-CM

## 2024-12-12 DIAGNOSIS — E78.5 HYPERLIPIDEMIA, UNSPECIFIED HYPERLIPIDEMIA TYPE: ICD-10-CM

## 2024-12-12 DIAGNOSIS — G43.109 MIGRAINE WITH AURA AND WITHOUT STATUS MIGRAINOSUS, NOT INTRACTABLE: ICD-10-CM

## 2024-12-12 DIAGNOSIS — Z76.89 ENCOUNTER TO ESTABLISH CARE: ICD-10-CM

## 2024-12-12 DIAGNOSIS — Z23 FLU VACCINE NEED: ICD-10-CM

## 2024-12-12 DIAGNOSIS — Z11.3 SCREENING FOR STD (SEXUALLY TRANSMITTED DISEASE): ICD-10-CM

## 2024-12-12 DIAGNOSIS — G47.33 OSA ON CPAP: ICD-10-CM

## 2024-12-12 DIAGNOSIS — R73.03 PREDIABETES: ICD-10-CM

## 2024-12-12 PROBLEM — F33.2 SEVERE EPISODE OF RECURRENT MAJOR DEPRESSIVE DISORDER, WITHOUT PSYCHOTIC FEATURES (HCC): Status: RESOLVED | Noted: 2023-03-22 | Resolved: 2024-12-12

## 2024-12-12 PROCEDURE — 3078F DIAST BP <80 MM HG: CPT | Mod: GC

## 2024-12-12 PROCEDURE — 90471 IMMUNIZATION ADMIN: CPT | Mod: GC

## 2024-12-12 PROCEDURE — 3075F SYST BP GE 130 - 139MM HG: CPT | Mod: GC

## 2024-12-12 PROCEDURE — 90656 IIV3 VACC NO PRSV 0.5 ML IM: CPT | Mod: GC

## 2024-12-12 PROCEDURE — 99204 OFFICE O/P NEW MOD 45 MIN: CPT | Mod: 25,GC

## 2024-12-12 RX ORDER — FERROUS SULFATE 325(65) MG
325 TABLET ORAL DAILY
COMMUNITY

## 2024-12-12 SDOH — HEALTH STABILITY: PHYSICAL HEALTH: ON AVERAGE, HOW MANY MINUTES DO YOU ENGAGE IN EXERCISE AT THIS LEVEL?: 10 MIN

## 2024-12-12 SDOH — ECONOMIC STABILITY: INCOME INSECURITY: IN THE LAST 12 MONTHS, WAS THERE A TIME WHEN YOU WERE NOT ABLE TO PAY THE MORTGAGE OR RENT ON TIME?: YES

## 2024-12-12 SDOH — ECONOMIC STABILITY: TRANSPORTATION INSECURITY
IN THE PAST 12 MONTHS, HAS THE LACK OF TRANSPORTATION KEPT YOU FROM MEDICAL APPOINTMENTS OR FROM GETTING MEDICATIONS?: NO

## 2024-12-12 SDOH — ECONOMIC STABILITY: TRANSPORTATION INSECURITY
IN THE PAST 12 MONTHS, HAS LACK OF TRANSPORTATION KEPT YOU FROM MEETINGS, WORK, OR FROM GETTING THINGS NEEDED FOR DAILY LIVING?: NO

## 2024-12-12 SDOH — ECONOMIC STABILITY: FOOD INSECURITY: WITHIN THE PAST 12 MONTHS, THE FOOD YOU BOUGHT JUST DIDN'T LAST AND YOU DIDN'T HAVE MONEY TO GET MORE.: SOMETIMES TRUE

## 2024-12-12 SDOH — HEALTH STABILITY: MENTAL HEALTH
STRESS IS WHEN SOMEONE FEELS TENSE, NERVOUS, ANXIOUS, OR CAN'T SLEEP AT NIGHT BECAUSE THEIR MIND IS TROUBLED. HOW STRESSED ARE YOU?: VERY MUCH

## 2024-12-12 SDOH — ECONOMIC STABILITY: TRANSPORTATION INSECURITY
IN THE PAST 12 MONTHS, HAS LACK OF RELIABLE TRANSPORTATION KEPT YOU FROM MEDICAL APPOINTMENTS, MEETINGS, WORK OR FROM GETTING THINGS NEEDED FOR DAILY LIVING?: NO

## 2024-12-12 SDOH — ECONOMIC STABILITY: INCOME INSECURITY: HOW HARD IS IT FOR YOU TO PAY FOR THE VERY BASICS LIKE FOOD, HOUSING, MEDICAL CARE, AND HEATING?: SOMEWHAT HARD

## 2024-12-12 SDOH — HEALTH STABILITY: PHYSICAL HEALTH: ON AVERAGE, HOW MANY DAYS PER WEEK DO YOU ENGAGE IN MODERATE TO STRENUOUS EXERCISE (LIKE A BRISK WALK)?: 1 DAY

## 2024-12-12 SDOH — ECONOMIC STABILITY: FOOD INSECURITY: WITHIN THE PAST 12 MONTHS, YOU WORRIED THAT YOUR FOOD WOULD RUN OUT BEFORE YOU GOT MONEY TO BUY MORE.: SOMETIMES TRUE

## 2024-12-12 SDOH — ECONOMIC STABILITY: HOUSING INSECURITY
IN THE LAST 12 MONTHS, WAS THERE A TIME WHEN YOU DID NOT HAVE A STEADY PLACE TO SLEEP OR SLEPT IN A SHELTER (INCLUDING NOW)?: YES

## 2024-12-12 ASSESSMENT — SOCIAL DETERMINANTS OF HEALTH (SDOH)
HOW OFTEN DO YOU ATTENT MEETINGS OF THE CLUB OR ORGANIZATION YOU BELONG TO?: MORE THAN 4 TIMES PER YEAR
HOW OFTEN DO YOU ATTENT MEETINGS OF THE CLUB OR ORGANIZATION YOU BELONG TO?: MORE THAN 4 TIMES PER YEAR
HOW OFTEN DO YOU ATTEND CHURCH OR RELIGIOUS SERVICES?: 1 TO 4 TIMES PER YEAR
IN A TYPICAL WEEK, HOW MANY TIMES DO YOU TALK ON THE PHONE WITH FAMILY, FRIENDS, OR NEIGHBORS?: THREE TIMES A WEEK
HOW OFTEN DO YOU ATTEND CHURCH OR RELIGIOUS SERVICES?: 1 TO 4 TIMES PER YEAR
HOW OFTEN DO YOU GET TOGETHER WITH FRIENDS OR RELATIVES?: ONCE A WEEK
HOW HARD IS IT FOR YOU TO PAY FOR THE VERY BASICS LIKE FOOD, HOUSING, MEDICAL CARE, AND HEATING?: SOMEWHAT HARD
IN A TYPICAL WEEK, HOW MANY TIMES DO YOU TALK ON THE PHONE WITH FAMILY, FRIENDS, OR NEIGHBORS?: THREE TIMES A WEEK
DO YOU BELONG TO ANY CLUBS OR ORGANIZATIONS SUCH AS CHURCH GROUPS UNIONS, FRATERNAL OR ATHLETIC GROUPS, OR SCHOOL GROUPS?: YES
DO YOU BELONG TO ANY CLUBS OR ORGANIZATIONS SUCH AS CHURCH GROUPS UNIONS, FRATERNAL OR ATHLETIC GROUPS, OR SCHOOL GROUPS?: YES
HOW OFTEN DO YOU HAVE SIX OR MORE DRINKS ON ONE OCCASION: NEVER
HOW OFTEN DO YOU HAVE A DRINK CONTAINING ALCOHOL: MONTHLY OR LESS
HOW OFTEN DO YOU GET TOGETHER WITH FRIENDS OR RELATIVES?: ONCE A WEEK
WITHIN THE PAST 12 MONTHS, YOU WORRIED THAT YOUR FOOD WOULD RUN OUT BEFORE YOU GOT THE MONEY TO BUY MORE: SOMETIMES TRUE
IN THE PAST 12 MONTHS, HAS THE ELECTRIC, GAS, OIL, OR WATER COMPANY THREATENED TO SHUT OFF SERVICE IN YOUR HOME?: YES
HOW MANY DRINKS CONTAINING ALCOHOL DO YOU HAVE ON A TYPICAL DAY WHEN YOU ARE DRINKING: 1 OR 2

## 2024-12-12 ASSESSMENT — ENCOUNTER SYMPTOMS
PHOTOPHOBIA: 0
DEPRESSION: 0
NAUSEA: 0
GASTROINTESTINAL NEGATIVE: 1
EYES NEGATIVE: 1
HEARTBURN: 0
TREMORS: 0
VOMITING: 0
FEVER: 0
HALLUCINATIONS: 0
BLURRED VISION: 0
WEIGHT LOSS: 0
DOUBLE VISION: 0
BACK PAIN: 0
RESPIRATORY NEGATIVE: 1
NECK PAIN: 0
SENSORY CHANGE: 0
CONSTITUTIONAL NEGATIVE: 1
HEADACHES: 1
MUSCULOSKELETAL NEGATIVE: 1
SPUTUM PRODUCTION: 0
TINGLING: 0
CHILLS: 0
NERVOUS/ANXIOUS: 1
ORTHOPNEA: 0
PALPITATIONS: 0
HEMOPTYSIS: 0
MYALGIAS: 0
COUGH: 0

## 2024-12-12 ASSESSMENT — PATIENT HEALTH QUESTIONNAIRE - PHQ9
8. MOVING OR SPEAKING SO SLOWLY THAT OTHER PEOPLE COULD HAVE NOTICED. OR THE OPPOSITE, BEING SO FIGETY OR RESTLESS THAT YOU HAVE BEEN MOVING AROUND A LOT MORE THAN USUAL: NOT AT ALL
2. FEELING DOWN, DEPRESSED, IRRITABLE, OR HOPELESS: NOT AT ALL
SUM OF ALL RESPONSES TO PHQ QUESTIONS 1-9: 3
SUM OF ALL RESPONSES TO PHQ9 QUESTIONS 1 AND 2: 0
1. LITTLE INTEREST OR PLEASURE IN DOING THINGS: NOT AT ALL
5. POOR APPETITE OR OVEREATING: SEVERAL DAYS
7. TROUBLE CONCENTRATING ON THINGS, SUCH AS READING THE NEWSPAPER OR WATCHING TELEVISION: NOT AT ALL
4. FEELING TIRED OR HAVING LITTLE ENERGY: SEVERAL DAYS
3. TROUBLE FALLING OR STAYING ASLEEP OR SLEEPING TOO MUCH: SEVERAL DAYS
6. FEELING BAD ABOUT YOURSELF - OR THAT YOU ARE A FAILURE OR HAVE LET YOURSELF OR YOUR FAMILY DOWN: NOT AL ALL
9. THOUGHTS THAT YOU WOULD BE BETTER OFF DEAD, OR OF HURTING YOURSELF: NOT AT ALL

## 2024-12-12 ASSESSMENT — LIFESTYLE VARIABLES
AUDIT-C TOTAL SCORE: 1
HOW OFTEN DO YOU HAVE A DRINK CONTAINING ALCOHOL: MONTHLY OR LESS
HOW OFTEN DO YOU HAVE SIX OR MORE DRINKS ON ONE OCCASION: NEVER
HOW MANY STANDARD DRINKS CONTAINING ALCOHOL DO YOU HAVE ON A TYPICAL DAY: 1 OR 2
SUBSTANCE_ABUSE: 0
SKIP TO QUESTIONS 9-10: 1

## 2024-12-12 ASSESSMENT — FIBROSIS 4 INDEX: FIB4 SCORE: 0.54

## 2024-12-12 NOTE — PATIENT INSTRUCTIONS
Please keep a log of your headaches so that we can review next visit!    Ref placed to sleep medicine and nutrition.    See you in 6 Weeks! Please do labs 1 week before next visit.

## 2024-12-12 NOTE — PROGRESS NOTES
NEW PATIENT VISIT      Patient ID:  Name: Catherine Porter  YOB: 1985    Chief Complaint(s):      Establish Care with Primary Care Physician  Establish Care and Medication Refill      History of Present Illness:    This is a pleasant 39 y.o. female here to establish care at this clinic with me as PCP.  Past medical history of obstructive sleep apnea with poor CPAP compliance, prediabetes on metformin 500, hyperlipidemia not on statin, vitamin D deficiency, chronic MDD and anxiety well-managed with escitalopram 10.    She was recently seen at the urgent care on 11/04/2025 for a fungal rash in the groin/pannus area.  She was given antifungal ointment and oral ketoconazole at the time, states complete resolution of symptoms and has completed courses of antifungals.    The patient presented to establish care and ensure prescriptions for anxiety medication and ibuprofen were refilled promptly when needed. The patient reported a history of obstructive sleep apnea diagnosed around four years ago during the COVID-19 pandemic, with a home sleep test. The patient owned a CPAP machine for four years but admitted to inconsistent use, resulting in headaches and fatigue when not used. The patient reported increased headaches, characterized as migraines, which have increased in frequency recently, with three episodes in the past week. These headaches were described as pulsating, located mostly near the eye, and accompanied by nausea and sensitivity to light. The patient used ibuprofen for relief, which generally resolved symptoms within one to two hours. The patient also mentioned experiencing anxiety daily, managed with escitalopram since 3113-6775, with a stable dose. The patient reported feeling anxious when not taking the medication.  The patient agrees with the plan of monitoring/logging her headaches with a journal and keeping mindful of triggers.  We will address frequency/severity of her  headaches for any worsening/changes to direct further management.  She also agrees to being plugged in with the sleep medicine doctor to determine if her CPAP settings are appropriate given the fact that her weight has fluctuated since her diagnosis in  along with poor compliance.  Is very interested in speaking with a nutritionist regarding her obesity (BMI 38.58).  Was heavily counseled on the risks of morbid obesity, predisposition to other health conditions, increase of overall mortality/morbidity.  She agrees to conducting CBC, lipid profile, A1c, serum vitamin D, hepatitis C testing and receiving flu vaccine this visit.    Review of systems grossly negative aside from episodic headache.  Physical examination including neurologic/cranial nerve examination negative.    PHQ 9 score of 3, KHOA-7 score of 8.  No SI/HI    PAST SURGICAL HISTORY:    - [place] teeth extraction  - Two  sections  - Vulvar cancer excision (, )    MEDICATIONS:    - Escitalopram (dose unspecified)  - Metformin (dose unspecified)  - Ibuprofen as needed  - Vitamin D supplements  - Iron supplements  - Allegra as needed    ALLERGIES:    Seasonal allergies managed with Allegra.    SOCIAL HISTORY:    - Occupation: Loan member   - Hobbies: Painting, dancing, and swimming  - Alcohol: Occasional, approximately once a month  - Tobacco: Tried in the past, no current use  - Recreational drug use: Cannabis occasionally for anxiety  - Marital status: In a committed relationship  - Contraception: Tubal ligation ()    FAMILY HISTORY:    - Hypertension: Mother and father  - Diabetes: Father (type 2), maternal aunties  - Heart attack: Father (age 52)  - Stroke: Maternal grandmother  - Cancer: Maternal grandmother (breast cancer), maternal great-aunt (leukemia), cousins (skin cancer)  - Arthritis: Mother and maternal aunts    Review of Systems   Constitutional: Negative.  Negative for chills, fever and weight loss.   HENT:  Negative.  Negative for ear pain, hearing loss and tinnitus.    Eyes: Negative.  Negative for blurred vision, double vision and photophobia.   Respiratory: Negative.  Negative for cough, hemoptysis and sputum production.    Cardiovascular:  Negative for chest pain, palpitations and orthopnea.   Gastrointestinal: Negative.  Negative for heartburn, nausea and vomiting.   Genitourinary: Negative.  Negative for dysuria, frequency and urgency.   Musculoskeletal: Negative.  Negative for back pain, myalgias and neck pain.   Skin: Negative.  Negative for rash.   Neurological:  Positive for headaches. Negative for tingling, tremors and sensory change.   Endo/Heme/Allergies: Negative.    Psychiatric/Behavioral:  Negative for depression, hallucinations, substance abuse and suicidal ideas. The patient is nervous/anxious.      Past Medical History:   Diagnosis Date    Hyperlipidemia LDL goal < 130 7/30/2015     Past Surgical History:   Procedure Laterality Date    WIDE EXCISION  12/20/2018    Procedure: WIDE EXCISION - LOCAL EXCISION OF VULVA;  Surgeon: Arya Quijano M.D.;  Location: SURGERY SAME DAY [facility];  Service: Gynecology    REPEAT C SECTION  12/23/2013    Performed by Dony Bales M.D. at [facility] DELIVERY    GYN SURGERY  primary c section    2010    TUBAL LIGATION       Family History   Problem Relation Age of Onset    Hypertension Mother     Arthritis Mother     Diabetes Father     Heart Disease Father     Heart Attack Father     Breast Cancer Maternal Aunt     Breast Cancer Maternal Grandmother     Cancer Maternal Grandmother         breast     Social History     Tobacco Use    Smoking status: Never    Smokeless tobacco: Never   Vaping Use    Vaping status: Never Used   Substance Use Topics    Alcohol use: Yes     Comment: 4/month or less    Drug use: Yes     Comment: cannabis gummies sometimes     Current Outpatient Medications   Medication Sig Dispense Refill    ferrous sulfate 325 (65 Fe) MG tablet  Take 325 mg by mouth every day.      escitalopram (LEXAPRO) 10 MG Tab Take 1 Tablet by mouth every day. 90 Tablet 0    metFORMIN ER (GLUCOPHAGE XR) 500 MG TABLET SR 24 HR Take 1 Tablet by mouth every day. 90 Tablet 0    ibuprofen (MOTRIN) 800 MG Tab Take 1 Tablet by mouth 3 times a day as needed for Moderate Pain. 30 Tablet 0    ergocalciferol (DRISDOL) 75386 UNIT capsule Take 1 Capsule by mouth every 7 days. 12 Capsule 0    EQ ALLERGY RELIEF 180 MG tablet Take 1 tablet by mouth once daily 90 Tablet 0     No current facility-administered medications for this visit.     Objective:    /60 (BP Location: Left arm, Patient Position: Sitting, BP Cuff Size: Adult)   Pulse 83   Temp 36.2 °C (97.2 °F) (Temporal)   Wt 108 kg (239 lb)   SpO2 98%   BMI 38.58 kg/m² Body mass index is 38.58 kg/m².  Physical Exam  Constitutional:       Appearance: Normal appearance. She is obese.   HENT:      Head: Normocephalic.      Right Ear: External ear normal.      Left Ear: External ear normal.      Nose: Nose normal.      Mouth/Throat:      Mouth: Mucous membranes are moist.   Cardiovascular:      Rate and Rhythm: Normal rate and regular rhythm.      Pulses: Normal pulses.      Heart sounds: Normal heart sounds.   Pulmonary:      Effort: Pulmonary effort is normal.      Breath sounds: Normal breath sounds.   Abdominal:      General: Abdomen is flat. Bowel sounds are normal.   Musculoskeletal:         General: Normal range of motion.   Skin:     General: Skin is warm.      Capillary Refill: Capillary refill takes less than 2 seconds.   Neurological:      General: No focal deficit present.      Mental Status: She is alert and oriented to person, place, and time. Mental status is at baseline.   Psychiatric:         Mood and Affect: Mood normal.         Behavior: Behavior normal.         Thought Content: Thought content normal.         Judgment: Judgment normal.     Assessment and Plan:     1. Encounter to establish care  Patient  presents to the clinic on 12/12/2024 to establish care with me as PCP.  History and physical examination as mentioned above.    2. Migraine with aura and without status migrainosus, not intractable  Patient states that she has chronic headaches that she feels at least once a month.  Recently she felt the same headache 3 days in a row (12/08, 12/09, 12/10) where she woke up in the morning and felt a disabling pulsatile left-sided headache associated with nausea, photophobia, relieved after 1 hour and sitting in a dark quiet room and with ibuprofen.  Meets pound criteria for migraine.    -Patient agrees with the plan to continue monitoring her symptoms with a headache diary describing the nature/intensity/frequency of her headaches.    -We will go over headache log next visit to address further need for addition of abortive therapy/prophylaxis.    -If there is severe worsening of her symptoms, imaging may be warranted.    3. Morbid obesity (HCC)  BMI 38.58, patient states that she has been recently overwhelmed and not being able to incorporate physical activity into her daily routine. Also states that her eating habits have progressively worsened especially given the holiday season with recent.  Was consulted on dietary and lifestyle modification recommendations, along with the dangers of morbid obesity.  Is interested in speaking with nutritionist.  - Referral to Nutrition Services    4. MARY on CPAP  Was diagnosed with obstructive sleep apnea in 2020 via home sleep study.  No formal consultation with sleep medicine doctor.  Patient reports poor compliance with CPAP machine, often falling asleep in the couch.  Denies excessive daytime somnolence when she is at work however says it is difficult to stay awake at home.  Patient agrees with plan of being referred to sleep medicine to assess need for repeat sleep study/titration of CPAP settings.  - Referral to Pulmonary and Sleep Medicine  - CBC WITH DIFFERENTIAL;  Future    5. Anxiety  6. MDD (major depressive disorder), recurrent episode, moderate (HCC)  PHQ-9 score of 3, KHOA-7 score of 8.  Patient states that her symptoms are overall well-managed with her escitalopram 10.  States that she has previously been on higher dose however caused her to be excessively fatigued.  Patient is currently not interested in speaking with a therapist/psychiatrist.  -Continue escitalopram 10    7. Prediabetes  A1c 5.8, due for screening for diabetes.  - HEMOGLOBIN A1C; Future  - Continue metformin 500    8. Hyperlipidemia, unspecified hyperlipidemia type  Previously panel showed LDL is 170, total cholesterol 251.  Given her prediabetes, high risk for cardiovascular disease.  - Lipid Profile; Future  - Will consider initiation of statin, patient is opting to attempt to manage with lifestyle/dietary modifications for the time being.    9. Vitamin D deficiency  Previous levels 14, taking vitamin D supplementation  - VITAMIN D,25 HYDROXY (DEFICIENCY); Future    10. History of iron deficiency  Previous history of iron deficiency, most recent TIBC low.  Reports heavy periods.  - CBC WITH DIFFERENTIAL; Future  -Continue iron supplementation    11. Vulvar cancer (HCC)  History of vulvar cancer s/p wide border excision 2019.  Is no longer following with gynecology.  - Will consider referring to gynecology upon reevaluation next visit.    12. Screening mammogram for breast cancer  Patient agrees to annual breast cancer screening.  - MA-SCREENING MAMMO BILAT W/CAD; Future    13. Flu vaccine need  Patient agrees to get flu vaccine during today's visit.  - INFLUENZA VACCINE TRI INJ (PF)     14. Screening for STD (sexually transmitted disease)  Patient agrees to one-time screening for hepatitis C virus.  - HEP C VIRUS ANTIBODY; Future     Problem List Items Addressed This Visit       MDD (major depressive disorder), recurrent episode, moderate (HCC)    Vulvar cancer (HCC)    Vitamin D deficiency     Relevant Orders    VITAMIN D,25 HYDROXY (DEFICIENCY)    Prediabetes    Relevant Orders    HEMOGLOBIN A1C    MARY on CPAP    Relevant Orders    Referral to Pulmonary and Sleep Medicine    CBC WITH DIFFERENTIAL    Morbid obesity (HCC)    Relevant Orders    Referral to Pulmonary and Sleep Medicine    HEP C VIRUS ANTIBODY    Referral to Nutrition Services    Anxiety     Other Visit Diagnoses       Migraine with aura and without status migrainosus, not intractable        Encounter for screening mammogram for malignant neoplasm of breast        Relevant Orders    MA-SCREENING MAMMO BILAT W/CAD    Flu vaccine need        Relevant Orders    INFLUENZA VACCINE TRI INJ (PF)  (Completed)    Screening for STD (sexually transmitted disease)        Relevant Orders    HEP C VIRUS ANTIBODY    Hyperlipidemia, unspecified hyperlipidemia type        Relevant Orders    Lipid Profile    History of iron deficiency        Relevant Orders    CBC WITH DIFFERENTIAL          Orders Placed This Encounter    MA-SCREENING MAMMO BILAT W/CAD    INFLUENZA VACCINE TRI INJ (PF)     HEP C VIRUS ANTIBODY    Lipid Profile    HEMOGLOBIN A1C    CBC WITH DIFFERENTIAL    VITAMIN D,25 HYDROXY (DEFICIENCY)    Referral to Pulmonary and Sleep Medicine    Referral to Nutrition Services    ferrous sulfate 325 (65 Fe) MG tablet     Return in about 6 weeks (around 1/23/2025).    Rian Boyd M.D. PGY-1  [facility] Internal Medicine Resident

## 2025-01-09 DIAGNOSIS — J01.00 ACUTE NON-RECURRENT MAXILLARY SINUSITIS: ICD-10-CM

## 2025-01-09 DIAGNOSIS — F41.9 ANXIETY: ICD-10-CM

## 2025-01-09 NOTE — TELEPHONE ENCOUNTER
Received request via: Patient    Was the patient seen in the last year in this department? Yes    Does the patient have an active prescription (recently filled or refills available) for medication(s) requested? No    Pharmacy Name: [facility] - [address]    Does the patient have jail Plus and need 100-day supply? (This applies to ALL medications) Patient does not have SCP

## 2025-01-12 RX ORDER — ESCITALOPRAM OXALATE 10 MG/1
10 TABLET ORAL DAILY
Qty: 90 TABLET | Refills: 0 | Status: SHIPPED | OUTPATIENT
Start: 2025-01-12

## 2025-01-12 RX ORDER — FEXOFENADINE HCL 180 MG/1
180 TABLET ORAL DAILY
Qty: 90 TABLET | Refills: 0 | Status: SHIPPED | OUTPATIENT
Start: 2025-01-12 | End: 2025-01-21 | Stop reason: SDUPTHER

## 2025-01-20 ENCOUNTER — HOSPITAL ENCOUNTER (OUTPATIENT)
Dept: LAB | Facility: MEDICAL CENTER | Age: 40
End: 2025-01-20
Payer: MEDICAID

## 2025-01-20 DIAGNOSIS — G47.33 OSA ON CPAP: ICD-10-CM

## 2025-01-20 DIAGNOSIS — E55.9 VITAMIN D DEFICIENCY: ICD-10-CM

## 2025-01-20 DIAGNOSIS — R73.03 PREDIABETES: ICD-10-CM

## 2025-01-20 DIAGNOSIS — Z11.3 SCREENING FOR STD (SEXUALLY TRANSMITTED DISEASE): ICD-10-CM

## 2025-01-20 DIAGNOSIS — Z86.39 HISTORY OF IRON DEFICIENCY: ICD-10-CM

## 2025-01-20 DIAGNOSIS — E78.5 HYPERLIPIDEMIA, UNSPECIFIED HYPERLIPIDEMIA TYPE: ICD-10-CM

## 2025-01-20 LAB
25(OH)D3 SERPL-MCNC: 20 NG/ML (ref 30–100)
BASOPHILS # BLD AUTO: 0.7 % (ref 0–1.8)
BASOPHILS # BLD: 0.04 K/UL (ref 0–0.12)
CHOLEST SERPL-MCNC: 244 MG/DL (ref 100–199)
EOSINOPHIL # BLD AUTO: 0.13 K/UL (ref 0–0.51)
EOSINOPHIL NFR BLD: 2.2 % (ref 0–6.9)
ERYTHROCYTE [DISTWIDTH] IN BLOOD BY AUTOMATED COUNT: 40.2 FL (ref 35.9–50)
EST. AVERAGE GLUCOSE BLD GHB EST-MCNC: 117 MG/DL
FASTING STATUS PATIENT QL REPORTED: NORMAL
HBA1C MFR BLD: 5.7 % (ref 4–5.6)
HCT VFR BLD AUTO: 41.4 % (ref 37–47)
HCV AB SER QL: NORMAL
HDLC SERPL-MCNC: 47 MG/DL
HGB BLD-MCNC: 13.5 G/DL (ref 12–16)
IMM GRANULOCYTES # BLD AUTO: 0.02 K/UL (ref 0–0.11)
IMM GRANULOCYTES NFR BLD AUTO: 0.3 % (ref 0–0.9)
LDLC SERPL CALC-MCNC: 160 MG/DL
LYMPHOCYTES # BLD AUTO: 1.87 K/UL (ref 1–4.8)
LYMPHOCYTES NFR BLD: 31.5 % (ref 22–41)
MCH RBC QN AUTO: 27.9 PG (ref 27–33)
MCHC RBC AUTO-ENTMCNC: 32.6 G/DL (ref 32.2–35.5)
MCV RBC AUTO: 85.5 FL (ref 81.4–97.8)
MONOCYTES # BLD AUTO: 0.27 K/UL (ref 0–0.85)
MONOCYTES NFR BLD AUTO: 4.5 % (ref 0–13.4)
NEUTROPHILS # BLD AUTO: 3.61 K/UL (ref 1.82–7.42)
NEUTROPHILS NFR BLD: 60.8 % (ref 44–72)
NRBC # BLD AUTO: 0 K/UL
NRBC BLD-RTO: 0 /100 WBC (ref 0–0.2)
PLATELET # BLD AUTO: 298 K/UL (ref 164–446)
PMV BLD AUTO: 9.4 FL (ref 9–12.9)
RBC # BLD AUTO: 4.84 M/UL (ref 4.2–5.4)
TRIGL SERPL-MCNC: 185 MG/DL (ref 0–149)
WBC # BLD AUTO: 5.9 K/UL (ref 4.8–10.8)

## 2025-01-20 PROCEDURE — 36415 COLL VENOUS BLD VENIPUNCTURE: CPT

## 2025-01-20 PROCEDURE — 86803 HEPATITIS C AB TEST: CPT

## 2025-01-20 PROCEDURE — 85025 COMPLETE CBC W/AUTO DIFF WBC: CPT

## 2025-01-20 PROCEDURE — 80061 LIPID PANEL: CPT

## 2025-01-20 PROCEDURE — 82306 VITAMIN D 25 HYDROXY: CPT

## 2025-01-20 PROCEDURE — 83036 HEMOGLOBIN GLYCOSYLATED A1C: CPT

## 2025-01-21 ENCOUNTER — OFFICE VISIT (OUTPATIENT)
Dept: INTERNAL MEDICINE | Facility: OTHER | Age: 40
End: 2025-01-21
Payer: MEDICAID

## 2025-01-21 VITALS
OXYGEN SATURATION: 97 % | DIASTOLIC BLOOD PRESSURE: 82 MMHG | HEIGHT: 66 IN | HEART RATE: 70 BPM | WEIGHT: 242.4 LBS | TEMPERATURE: 97.6 F | BODY MASS INDEX: 38.96 KG/M2 | SYSTOLIC BLOOD PRESSURE: 128 MMHG

## 2025-01-21 DIAGNOSIS — E78.5 DYSLIPIDEMIA: ICD-10-CM

## 2025-01-21 DIAGNOSIS — R73.03 PREDIABETES: ICD-10-CM

## 2025-01-21 DIAGNOSIS — E55.9 VITAMIN D DEFICIENCY: ICD-10-CM

## 2025-01-21 DIAGNOSIS — G47.33 OSA ON CPAP: ICD-10-CM

## 2025-01-21 DIAGNOSIS — G44.209 TENSION HEADACHE: ICD-10-CM

## 2025-01-21 DIAGNOSIS — E66.01 MORBID OBESITY (HCC): ICD-10-CM

## 2025-01-21 DIAGNOSIS — J01.00 ACUTE NON-RECURRENT MAXILLARY SINUSITIS: ICD-10-CM

## 2025-01-21 PROCEDURE — 3079F DIAST BP 80-89 MM HG: CPT | Mod: GC

## 2025-01-21 PROCEDURE — 99213 OFFICE O/P EST LOW 20 MIN: CPT | Mod: GE

## 2025-01-21 PROCEDURE — 3074F SYST BP LT 130 MM HG: CPT | Mod: GC

## 2025-01-21 RX ORDER — METFORMIN HYDROCHLORIDE 500 MG/1
500 TABLET, EXTENDED RELEASE ORAL DAILY
Qty: 90 TABLET | Refills: 0 | Status: SHIPPED | OUTPATIENT
Start: 2025-01-21

## 2025-01-21 RX ORDER — FEXOFENADINE HCL 180 MG/1
180 TABLET ORAL DAILY
Qty: 90 TABLET | Refills: 0 | Status: SHIPPED | OUTPATIENT
Start: 2025-01-21

## 2025-01-21 RX ORDER — IBUPROFEN 800 MG/1
800 TABLET, FILM COATED ORAL 3 TIMES DAILY PRN
Qty: 30 TABLET | Refills: 0 | Status: SHIPPED | OUTPATIENT
Start: 2025-01-21

## 2025-01-21 RX ORDER — ERGOCALCIFEROL 1.25 MG/1
50000 CAPSULE ORAL
Qty: 12 CAPSULE | Refills: 0 | Status: SHIPPED | OUTPATIENT
Start: 2025-01-21

## 2025-01-21 ASSESSMENT — FIBROSIS 4 INDEX: FIB4 SCORE: 0.53

## 2025-01-21 ASSESSMENT — ENCOUNTER SYMPTOMS
MUSCULOSKELETAL NEGATIVE: 1
RESPIRATORY NEGATIVE: 1
NEUROLOGICAL NEGATIVE: 1
CONSTITUTIONAL NEGATIVE: 1
PSYCHIATRIC NEGATIVE: 1
GASTROINTESTINAL NEGATIVE: 1
EYES NEGATIVE: 1
CARDIOVASCULAR NEGATIVE: 1

## 2025-01-21 ASSESSMENT — PATIENT HEALTH QUESTIONNAIRE - PHQ9: CLINICAL INTERPRETATION OF PHQ2 SCORE: 0

## 2025-01-21 NOTE — LETTER
To whom it may concern,    Catherine Owen established with me as primary care physician in December last year we had her initial appointment.  She was thoroughly evaluated and her medical history was reviewed at the time.  She is regularly following up with me as primary care physician.  If any other questions arise, please do not hesitate to contact our office and inquire with me directly.    Warm regards,    Rian Boyd M.D.

## 2025-01-21 NOTE — PATIENT INSTRUCTIONS
- Please schedule an appointment for mammogram  - Please schedule an appointment for nutritional services  - Please schedule appointment for pulmonary and sleep medicine  - Refilled metformin, ibuprofen, Allegra  - Start vitamin D supplementation once weekly  Seen in 3-month 621

## 2025-01-21 NOTE — PROGRESS NOTES
ESTABLISHED PATIENT VISIT    PATIENT ID:  Name: Catherine Porter  YOB: 1985  Patient Care Team:  Rian Boyd M.D. as PCP - General (Internal Medicine)    CHIEF COMPLAINT(s):  Follow-Up and Medication Refill (Metformin/Ibuprofen/Fexofenadine/)    Follow up for Diagnoses of Prediabetes, Tension headache, Acute non-recurrent maxillary sinusitis, Vitamin D deficiency, Dyslipidemia, and MARY on CPAP were pertinent to this visit.    History of Present Illness:    This is a pleasant 39 y.o. female clinic patient established with me as primary care physician who presents today for a follow-up visit and med refills.    Patient states that she has no complaints during today's visit.  Previously was evaluated for headaches, however stated that since last visit, she has not had recurrence of her symptoms.  Instructed to continue taking headache log if symptoms recur.  Instructed to schedule appointment with sleep medicine and nutrition at her earliest convenience.  States that she will also schedule her mammogram for routine examination after today's visit.  Otherwise, labs reviewed with patient.  Hemoglobin A1c improving from 5.8-5.7, hyperlipidemia improving, total cholesterol improved from 2 51-2 44, LDL improved to 170->160.  ASCVD 1.2%.  Vitamin D level is persistently low at 20, instructed to begin once weekly supplementation with 50K IU.  States no changes to vulva or any abnormal lesions compared to last visit.    Patient Active Problem List    Diagnosis Date Noted    MDD (major depressive disorder), recurrent episode, moderate (HCC) 12/12/2024    Vulvar cancer (HCC) 12/12/2024    MARY on CPAP 03/20/2023    Morbid obesity (HCC) 03/20/2023    Prediabetes 02/06/2019    Dyslipidemia 02/06/2019    Vitamin D deficiency 08/27/2015    Anxiety 07/16/2015     Review of Systems   Constitutional: Negative.    HENT: Negative.     Eyes: Negative.    Respiratory: Negative.     Cardiovascular:  Negative.    Gastrointestinal: Negative.    Genitourinary: Negative.    Musculoskeletal: Negative.    Skin: Negative.    Neurological: Negative.    Endo/Heme/Allergies: Negative.    Psychiatric/Behavioral: Negative.       Past Medical History:   Diagnosis Date    Hyperlipidemia LDL goal < 130 7/30/2015     Past Surgical History:   Procedure Laterality Date    WIDE EXCISION  12/20/2018    Procedure: WIDE EXCISION - LOCAL EXCISION OF VULVA;  Surgeon: Arya Quijano M.D.;  Location: SURGERY SAME DAY [facility];  Service: Gynecology    REPEAT C SECTION  12/23/2013    Performed by Dony Bales M.D. at LABOR AND DELIVERY    GYN SURGERY  primary c section    2010    TUBAL LIGATION       Family History   Problem Relation Age of Onset    Hypertension Mother     Arthritis Mother     Diabetes Father     Heart Disease Father     Heart Attack Father     Breast Cancer Maternal Aunt     Breast Cancer Maternal Grandmother     Cancer Maternal Grandmother         breast     Patient has no known allergies.  Social History     Tobacco Use    Smoking status: Never    Smokeless tobacco: Never   Vaping Use    Vaping status: Never Used   Substance Use Topics    Alcohol use: Yes     Comment: 4/month or less    Drug use: Not Currently     Types: Marijuana     Comment: cannabis gummies sometimes     Current Outpatient Medications   Medication Sig Dispense Refill    fexofenadine (EQ ALLERGY RELIEF) 180 MG tablet Take 1 Tablet by mouth every day. 90 Tablet 0    escitalopram (LEXAPRO) 10 MG Tab Take 1 Tablet by mouth every day. 90 Tablet 0    ferrous sulfate 325 (65 Fe) MG tablet Take 325 mg by mouth every day.      metFORMIN ER (GLUCOPHAGE XR) 500 MG TABLET SR 24 HR Take 1 Tablet by mouth every day. 90 Tablet 0    ibuprofen (MOTRIN) 800 MG Tab Take 1 Tablet by mouth 3 times a day as needed for Moderate Pain. 30 Tablet 0    ergocalciferol (DRISDOL) 92265 UNIT capsule Take 1 Capsule by mouth every 7 days. 12 Capsule 0     No current  "facility-administered medications for this visit.     OBJECTIVE:  /82 (BP Location: Left arm, Patient Position: Sitting, BP Cuff Size: Large adult)   Pulse 70   Temp 36.4 °C (97.6 °F) (Temporal)   Ht 1.676 m (5' 6\")   Wt 110 kg (242 lb 6.4 oz)   SpO2 97%   BMI 39.12 kg/m²   Physical Exam  Constitutional:       Appearance: Normal appearance. She is obese.   HENT:      Head: Normocephalic.      Right Ear: External ear normal.      Left Ear: External ear normal.      Nose: Nose normal.      Mouth/Throat:      Mouth: Mucous membranes are moist.   Cardiovascular:      Rate and Rhythm: Normal rate and regular rhythm.      Pulses: Normal pulses.      Heart sounds: Normal heart sounds.   Pulmonary:      Effort: Pulmonary effort is normal.      Breath sounds: Normal breath sounds.   Abdominal:      General: Abdomen is flat. Bowel sounds are normal.   Musculoskeletal:         General: Normal range of motion.   Skin:     General: Skin is warm.      Capillary Refill: Capillary refill takes less than 2 seconds.   Neurological:      General: No focal deficit present.      Mental Status: She is alert. Mental status is at baseline.   Psychiatric:         Mood and Affect: Mood normal.         Behavior: Behavior normal.         Thought Content: Thought content normal.         Judgment: Judgment normal.   ASSESSMENT AND PLAN:     1. Prediabetes  2.  Morbid obesity (BMI 39.12)  Improving from 5.8 to now 5.7.  States dietary modifications are also implemented.  Will schedule appointment with nutrition this afternoon.  - metFORMIN ER (GLUCOPHAGE XR) 500 MG TABLET SR 24 HR; Take 1 Tablet by mouth every day.  Dispense: 90 Tablet; Refill: 0    2. Tension headache  3. MARY on CPAP  States headaches have improved since last visit, only had 1 headache in the last 5 weeks.  Possibly attributed to sleep apnea.  Patient has been losing weight.  Will schedule appointment with sleep medicine for formal sleep study.  - ibuprofen (MOTRIN) " 800 MG Tab; Take 1 Tablet by mouth 3 times a day as needed for Moderate Pain.  Dispense: 30 Tablet; Refill: 0    4. Acute non-recurrent maxillary sinusitis  States allergies well-controlled for the time being, however still has to periodically use Allegra.  Requesting refill.  - fexofenadine (EQ ALLERGY RELIEF) 180 MG tablet; Take 1 Tablet by mouth every day.  Dispense: 90 Tablet; Refill: 0    5. Vitamin D deficiency  Vitamin D levels 20, patient states that she is taking unknown dose of daily vitamin D, however forgets to take dose at times.  Would like to take once weekly dosage and recheck in 1 year.  - ergocalciferol (DRISDOL) 76241 UNIT capsule; Take 1 Capsule by mouth every 7 days.  Dispense: 12 Capsule; Refill: 0    6. Dyslipidemia  ASCVD 1.2%.  Cholesterol levels slightly improved since last visit, total 244 (from 251),  (from 170).  Triglycerides persistently elevated 185.  Patient is yet to schedule appointment with nutrition to discuss dietary modifications to address elevated cholesterol and triglycerides.  - Preliminarily given recommendations for dietary and lifestyle modifications, such as consuming less processed carbs and sugars, less red meat, increasing exercise to at least 3 times weekly for 90 minutes a week.  -Appreciate nutrition services for further recommendations    Problem List Items Addressed This Visit       Vitamin D deficiency    Prediabetes    Dyslipidemia    MARY on CPAP     Other Visit Diagnoses       Tension headache        Acute non-recurrent maxillary sinusitis              No orders of the defined types were placed in this encounter.    Return in about 3 months (around 4/21/2025).    Rian Boyd M.D. PGY-1  [facility] Internal [facility] Resident

## 2025-02-07 ENCOUNTER — HOSPITAL ENCOUNTER (EMERGENCY)
Facility: MEDICAL CENTER | Age: 40
End: 2025-02-07
Attending: EMERGENCY MEDICINE
Payer: MEDICAID

## 2025-02-07 ENCOUNTER — APPOINTMENT (OUTPATIENT)
Dept: RADIOLOGY | Facility: MEDICAL CENTER | Age: 40
End: 2025-02-07
Attending: EMERGENCY MEDICINE
Payer: MEDICAID

## 2025-02-07 ENCOUNTER — PHARMACY VISIT (OUTPATIENT)
Dept: PHARMACY | Facility: MEDICAL CENTER | Age: 40
End: 2025-02-07
Payer: COMMERCIAL

## 2025-02-07 VITALS
HEART RATE: 73 BPM | TEMPERATURE: 97.7 F | SYSTOLIC BLOOD PRESSURE: 148 MMHG | HEIGHT: 66 IN | WEIGHT: 242 LBS | DIASTOLIC BLOOD PRESSURE: 87 MMHG | OXYGEN SATURATION: 97 % | BODY MASS INDEX: 38.89 KG/M2 | RESPIRATION RATE: 16 BRPM

## 2025-02-07 DIAGNOSIS — S09.90XA CLOSED HEAD INJURY, INITIAL ENCOUNTER: ICD-10-CM

## 2025-02-07 DIAGNOSIS — S32.010A CLOSED COMPRESSION FRACTURE OF L1 LUMBAR VERTEBRA, INITIAL ENCOUNTER (HCC): ICD-10-CM

## 2025-02-07 DIAGNOSIS — S32.10XA CLOSED FRACTURE OF SACRUM, UNSPECIFIED PORTION OF SACRUM, INITIAL ENCOUNTER (HCC): ICD-10-CM

## 2025-02-07 DIAGNOSIS — M54.50 LUMBAR BACK PAIN: ICD-10-CM
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
## 2025-02-07 LAB — HCG SERPL QL: NEGATIVE

## 2025-02-07 PROCEDURE — 99284 EMERGENCY DEPT VISIT MOD MDM: CPT

## 2025-02-07 PROCEDURE — 72192 CT PELVIS W/O DYE: CPT

## 2025-02-07 PROCEDURE — 72131 CT LUMBAR SPINE W/O DYE: CPT

## 2025-02-07 PROCEDURE — 700102 HCHG RX REV CODE 250 W/ 637 OVERRIDE(OP): Mod: UD | Performed by: EMERGENCY MEDICINE

## 2025-02-07 PROCEDURE — A9270 NON-COVERED ITEM OR SERVICE: HCPCS | Mod: UD | Performed by: EMERGENCY MEDICINE

## 2025-02-07 PROCEDURE — RXMED WILLOW AMBULATORY MEDICATION CHARGE: Performed by: EMERGENCY MEDICINE

## 2025-02-07 PROCEDURE — 96374 THER/PROPH/DIAG INJ IV PUSH: CPT

## 2025-02-07 PROCEDURE — 84703 CHORIONIC GONADOTROPIN ASSAY: CPT

## 2025-02-07 PROCEDURE — 36415 COLL VENOUS BLD VENIPUNCTURE: CPT

## 2025-02-07 PROCEDURE — 700111 HCHG RX REV CODE 636 W/ 250 OVERRIDE (IP): Performed by: EMERGENCY MEDICINE

## 2025-02-07 RX ORDER — KETOROLAC TROMETHAMINE 15 MG/ML
15 INJECTION, SOLUTION INTRAMUSCULAR; INTRAVENOUS ONCE
Status: COMPLETED | OUTPATIENT
Start: 2025-02-07 | End: 2025-02-07

## 2025-02-07 RX ORDER — HYDROCODONE BITARTRATE AND ACETAMINOPHEN 5; 325 MG/1; MG/1
1 TABLET ORAL EVERY 6 HOURS PRN
Qty: 12 TABLET | Refills: 0 | Status: SHIPPED | OUTPATIENT
Start: 2025-02-07 | End: 2025-02-07

## 2025-02-07 RX ORDER — HYDROCODONE BITARTRATE AND ACETAMINOPHEN 5; 325 MG/1; MG/1
1 TABLET ORAL EVERY 6 HOURS PRN
Qty: 12 TABLET | Refills: 0 | Status: SHIPPED | OUTPATIENT
Start: 2025-02-07 | End: 2025-02-10

## 2025-02-07 RX ORDER — HYDROCODONE BITARTRATE AND ACETAMINOPHEN 5; 325 MG/1; MG/1
1 TABLET ORAL ONCE
Status: COMPLETED | OUTPATIENT
Start: 2025-02-07 | End: 2025-02-07

## 2025-02-07 RX ADMIN — HYDROCODONE BITARTRATE AND ACETAMINOPHEN 1 TABLET: 5; 325 TABLET ORAL at 15:45

## 2025-02-07 RX ADMIN — KETOROLAC TROMETHAMINE 15 MG: 15 INJECTION, SOLUTION INTRAMUSCULAR; INTRAVENOUS at 13:34

## 2025-02-07 ASSESSMENT — FIBROSIS 4 INDEX: FIB4 SCORE: 0.53

## 2025-02-07 NOTE — PROGRESS NOTES
Neurosurgery Note    Called to review case [id number], called back [phone]  L1 compression fracture, mild loss of height.    - pain control  - TLSO when up and out of bed, can don at bedside  - will f/u in 6 weeks with Xrays    Ingrid Bailey M.D.

## 2025-02-07 NOTE — ED TRIAGE NOTES
"Chief Complaint   Patient presents with    T-5000 GLF     Reports slip and fall on the ice, report low back pain after fall. + head strike, -LOC.      Pain    Back Pain   BIB EMS  No loss of BorB, CMS+ received 4mg morphine PTA. Pt reports 9/10.    GCS 15  /89   Pulse 83   Temp 36.5 °C (97.7 °F) (Temporal)   Resp 18   Ht 1.676 m (5' 6\")   SpO2 98%   Pt informed of wait times. Educated on triage process.  Asked to return to triage RN for any new or worsening of symptoms. Thanked for patience.      "

## 2025-02-07 NOTE — ED PROVIDER NOTES
ED Provider Note    CHIEF COMPLAINT  Chief Complaint   Patient presents with    T-5000 GLF     Reports slip and fall on the ice, report low back pain after fall. + head strike, -LOC.      Pain    Back Pain       EXTERNAL RECORDS REVIEWED  PDMP is reviewed.    HPI/ROS  LIMITATION TO HISTORY   Select: : None  OUTSIDE HISTORIAN(S):  None.    Catherine Porter is a 39 y.o. female who presents to the emergency department complaining of low back pain.  The patient slipped on the ice and fell with her feet flew out in front of her and she landed on her back and buttocks.  She felt and heard a crack in her back.  He hit her head but did not get knocked out.  She had transient nausea which is resolved.  No vomiting.  No neck pain.  Her chest or abdomen.  She has no injury to her upper back she has pain in the upper portion of the lumbar spine as well as in the sacrum.  No numbness or ting in her legs.  She the pain is fairly severe and she heard a crack.  No bowel or bladder symptoms.    PAST MEDICAL HISTORY   has a past medical history of Hyperlipidemia LDL goal < 130 (7/30/2015).    SURGICAL HISTORY   has a past surgical history that includes gyn surgery (primary c section); repeat c section (12/23/2013); wide excision (12/20/2018); and tubal ligation.    FAMILY HISTORY  Family History   Problem Relation Age of Onset    Hypertension Mother     Arthritis Mother     Diabetes Father     Heart Disease Father     Heart Attack Father     Breast Cancer Maternal Aunt     Breast Cancer Maternal Grandmother     Cancer Maternal Grandmother         breast       SOCIAL HISTORY  Social History     Tobacco Use    Smoking status: Never    Smokeless tobacco: Never   Vaping Use    Vaping status: Never Used   Substance and Sexual Activity    Alcohol use: Yes     Comment: 4/month or less    Drug use: Not Currently     Types: Marijuana     Comment: cannabis gummies sometimes    Sexual activity: Yes     Partners: Male     Birth  "control/protection: Surgical     Comment: tubes are tied       CURRENT MEDICATIONS  Home Medications       Reviewed by Apoorva Ballard R.N. (Registered Nurse) on 02/07/25 at 1216  Med List Status: Partial     Medication Last Dose Status   ergocalciferol (DRISDOL) 86108 UNIT capsule  Active   escitalopram (LEXAPRO) 10 MG Tab  Active   ferrous sulfate 325 (65 Fe) MG tablet  Active   fexofenadine (EQ ALLERGY RELIEF) 180 MG tablet  Active   ibuprofen (MOTRIN) 800 MG Tab  Active   metFORMIN ER (GLUCOPHAGE XR) 500 MG TABLET SR 24 HR  Active                    ALLERGIES  No Known Allergies    PHYSICAL EXAM  VITAL SIGNS: /89   Pulse 83   Temp 36.5 °C (97.7 °F) (Temporal)   Resp 18   Ht 1.676 m (5' 6\")   SpO2 98%   BMI 39.12 kg/m²    Constitutional: Well developed, Well nourished, No acute distress, Non-toxic appearance.   HENT: Normocephalic, Atraumatic, contusions or signs of trauma.  Eyes: PERRL, EOMI, Conjunctiva normal, No discharge.   Neck: Normal range of motion, clinically clearable.  Cardiovascular: Normal heart rate, Normal rhythm, No murmurs, No rubs, No gallops.   Thorax & Lungs: Normal breath sounds, No respiratory distress, No wheezing, No chest tenderness.   Abdomen: Soft nontender nondistended.  Skin: Warm, Dry, No erythema, No rash.   Back: Tender in the upper lumbar spine.  No thoracic tenderness.  Musculoskeletal: Good range of motion in all major joints.  Over the sacrum.  Neurologic: Alert, No focal deficits noted.  Normal strength sensation both lower extremities.  Psychiatric: Affect normal      EKG/LABS  No labs indicated.  I have independently interpreted this EKG    RADIOLOGY/PROCEDURES   I have independently interpreted the diagnostic imaging associated with this visit and am waiting the final reading from the radiologist.   My preliminary interpretation is as follows:     Radiologist interpretation:  CT-PELVIS W/O   Final Result      There is a transverse fracture of the inferior " sacrum which is not significantly displaced.      CT-LSPINE W/O PLUS RECONS   Final Result      1.  L1 acute superior endplate compression fracture. Comminuted but without retropulsion or posterior element involvement.   2.  Incidentally noted multilevel Schmorl's node endplate irregularities T12-L1 through L3-4.          COURSE & MEDICAL DECISION MAKING    ASSESSMENT, COURSE AND PLAN  Care Narrative:     39-year-old female presents with back pain and pelvic pain after falling on her back on the ice.  She hit her head but she did not get knocked out.  She is a clear sensorium and she has no secondary signs to suggest intracranial injury.  I do not think she requires a head CT.  Neck is clinically clear.  She is focal tenderness over the sacrum and lumbar spine CTs of these areas are obtained.  She has an L1 compression fracture as well as a sacral fracture.    Patient has a normal neurologic examination.  Discussed her lumbar fracture with Dr. Bailey on-call for spine surgery.  She recommends an off-the-shelf TLSO brace and follow-up in few weeks.  Patient is agreeable with this plan.    Given Norco for discomfort and will size her for a TLSO brace.  Examined the brace and she is comfortable.  She has normal neurologic exam and a stable gait    For her sacral fracture I have paged the orthopedic surgeon.  I spoke with Dr. Gomez who states this distal and just pain management stopped sitting on a donut and no further management is needed but he will see her in follow-up.      Patient is reassessed she is able to ambulate.  She has no other apparent injury to her chest or abdomen.  She has a clear sensorium.    Has been referred to Dr. Guerra from spine/neurosurgery and orthopedic surgery as above.  Return precautions and discharge instructions are discussed.  She is prescribed Norco for discomfort.      In prescribing controlled substances to this patient, I certify that I have obtained and reviewed the  medical history of Catherine Porter. I have also made a good deepika effort to obtain applicable records from other providers who have treated the patient and no other records are available at this time.     I have conducted a physical exam and documented it. I have reviewed Ms. Porter’s prescription history as maintained by the [place] Prescription Monitoring Program.     I have assessed the patient’s risk for abuse, dependency, and addiction using the validated Opioid Risk Tool available at https://www.mdcalc.com/mbobaj-sirf-twtf-ort-narcotic-abuse.     Given the above, I believe the benefits of controlled substance therapy outweigh the risks. The reasons for prescribing controlled substances include non-narcotic, oral analgesic alternatives have been inadequate for pain control. Accordingly, I have discussed the risk and benefits, treatment plan, and alternative therapies with the patient.     Ingrid Bailey M.D.  [address] [facility] [address]  [phone]    Schedule an appointment as soon as possible for a visit in 3 weeks      Suleiman Gomez M.D.  [address] [facility] [id number] [phone]    Schedule an appointment as soon as possible for a visit in 3 days      Ingrid Bailey M.D.  [address] [facility] [id number] [phone]                DISPOSITION AND DISCUSSIONS  I have discussed management of the patient with the following physicians and ROHITH's: [facility].      FINAL DIAGNOSIS  1. Fall, initial encounter    2. Closed head injury, initial encounter    3. Lumbar back pain    4. Closed compression fracture of L1 lumbar vertebra, initial encounter ([facility])    5. Closed fracture of sacrum, unspecified portion of sacrum, initial encounter ([facility])         Electronically signed by: Justen Jacinto M.D., 2/7/2025 1:18 PM

## 2025-02-08 NOTE — DISCHARGE INSTRUCTIONS
You have a fracture of your L1 vertebral body and your sacrum.  For your lumbar fracture, use the brace when you are up and around.  You may take it off at night.  Follow-up with Dr. Bailey next 2 weeks.  Return to the ER for more pain, or for any numbness, tingling, weakness in your legs or other complaints.  Take Norco for pain.  For your sacral fracture rest and follow-up with orthopedic doctor.  You can sit on a soft pillow or donut.  Also return for pain numbness or other concerns.

## 2025-02-08 NOTE — ED NOTES
Discharge education provided by ERP. Discharge paperwork reviewed with patient. Prescription to be picked up by patient, escorted by staff to pharmacy to avoid confusion. All questions answered. All belongings with patient. PIV removed. Patient ambulated with steady gait out of care area.

## 2025-02-12 NOTE — Clinical Note
REFERRAL APPROVAL NOTICE         Sent on February 12, 2025                   Catherine Porter  [address] [place] [address]                   Dear Ms. Porter,    After a careful review of the medical information and benefit coverage, [facility] has processed your referral. See below for additional details.    If applicable, you must be actively enrolled with your insurance for coverage of the authorized service. If you have any questions regarding your coverage, please contact your insurance directly.    REFERRAL INFORMATION   Referral #:  [id number]  Referred-To Provider    Referred-By Provider:  Neurosurgery    Justen Jacinto M.D.   [facility]      [address] [place] [address] Emergency Room  [address]  [place] [address]  [phone] [address]  [place] [address]  [phone]    Referral Start Date:  02/07/2025  Referral End Date:   02/07/2026             SCHEDULING  If you do not already have an appointment, please call [phone] to make an appointment.     MORE INFORMATION  If you do not already have a [employer] account, sign up at: [email or web address]  You can access your medical information, make appointments, see lab results, billing information, and more.  If you have questions regarding this referral, please contact  the [facility] Referrals department at:             [phone]. Monday - Friday 8:00AM - 5:00PM.     Sincerely,    [facility]

## 2025-02-28 ENCOUNTER — APPOINTMENT (OUTPATIENT)
Dept: RADIOLOGY | Facility: MEDICAL CENTER | Age: 40
End: 2025-02-28
Payer: MEDICAID

## 2025-02-28 DIAGNOSIS — M48.56XA LUMBAR VERTEBRAL COLLAPSE, INITIAL ENCOUNTER (HCC): ICD-10-CM

## 2025-02-28 DIAGNOSIS — Z12.31 SCREENING MAMMOGRAM FOR BREAST CANCER: ICD-10-CM

## 2025-02-28 PROCEDURE — 77067 SCR MAMMO BI INCL CAD: CPT

## 2025-02-28 PROCEDURE — 72100 X-RAY EXAM L-S SPINE 2/3 VWS: CPT

## 2025-03-18 ENCOUNTER — TELEPHONE (OUTPATIENT)
Dept: HEALTH INFORMATION MANAGEMENT | Facility: OTHER | Age: 40
End: 2025-03-18
Payer: MEDICAID

## 2025-03-31 ENCOUNTER — PHYSICAL THERAPY (OUTPATIENT)
Dept: PHYSICAL THERAPY | Facility: REHABILITATION | Age: 40
End: 2025-03-31
Payer: MEDICAID

## 2025-03-31 DIAGNOSIS — M54.50 LUMBAR PAIN: ICD-10-CM

## 2025-03-31 DIAGNOSIS — S32.010A CLOSED COMPRESSION FRACTURE OF BODY OF L1 VERTEBRA (HCC): ICD-10-CM

## 2025-03-31 PROCEDURE — 97163 PT EVAL HIGH COMPLEX 45 MIN: CPT

## 2025-03-31 ASSESSMENT — ENCOUNTER SYMPTOMS
PAIN SCALE: 6
PAIN SCALE AT HIGHEST: 6
PAIN SCALE AT LOWEST: 0

## 2025-03-31 NOTE — OP THERAPY EVALUATION
Outpatient Physical Therapy  INITIAL EVALUATION    [facility] Physical Therapy [place] [address], [address]  [place] [address]  Phone:  [phone]    Date of Evaluation: 03/31/2025    Patient: Catherine Porter  YOB: 1985  MRN: [id number]     Referring Provider: Rubina Stratton P.A.-C.  [address] [place] [address]   Referring Diagnosis Lumbar pain [M54.50];Closed compression fracture of body of L1 vertebra (HCC) [S32.010A]     Time Calculation  Start time: 0930  Stop time: 1015 Time Calculation (min): 45 minutes         Chief Complaint: Back Problem    Visit Diagnoses     ICD-10-CM   1. Lumbar pain  M54.50   2. Closed compression fracture of body of L1 vertebra (HCC)  S32.010A       Date of onset of impairment: 2/7/2025    Subjective:   History of Present Illness:     Mechanism of injury:  Pt. is a 39 Y.O. F who reports on 02/07/25 she slipped on ice falling backward on her back and buttocks. She went to the ED and had + finding on Xray.  She has been followed by [facility] orthopedist/PA ever since. Pt. Takes meds at night. Pt. is on restrictions of no lift >10- 15#, off work until 03/23. Pt. Is a  (sitting 75% /standing 25%).     Medical chart review on 03/13: Catherine Porter is a 39 y.o. female with 25 weeks [ error: doi 02/07/25] status post L1 compression fracture and sacral fracture. [Pt. Also seen for sacral fx on 03/26 at [facility].] Primary concern is significant low back pain and buttock pain. [Meds: tramadol(P), Tizanidine (P)] X-rays of lumbar spine demonstrate compression fracture overall acceptable alignment, no obvious evidence of further collapse.  As far as patient's lumbar spine is concerned patient can slowly ramp up back to full activity if cleared by trauma when she sees him on 3/26/2025.  A referral for physical therapy provided to patient, advised patient to not start this until she sees trauma.  A new referral to pain management for  evaluation and treatment was provided to patient today.  Patient will follow-up once more in approximately 4 weeks.     PMHx: significant for insignificant     Pain:     Current pain ratin    At best pain ratin    At worst pain ratin    Pain Comments::  PAIN  Location: 1. L/S ,  2. tailbone  Descriptors: 1. Pressure, sharp  Im 2. sit   Aggravated with night/sleep, with setting technique 5-10 minutes.  Eases with bracing corset-style, heat  Progression: improving   Diagnostic Tests:     X-ray: abnormal    Patient Goals:     Patient goals for therapy:  Return to work      Past Medical History:   Diagnosis Date    Hyperlipidemia LDL goal < 130 2015     Past Surgical History:   Procedure Laterality Date    WIDE EXCISION  2018    Procedure: WIDE EXCISION - LOCAL EXCISION OF VULVA;  Surgeon: Arya Quijano M.D.;  Location: SURGERY SAME DAY [facility];  Service: Gynecology    REPEAT C SECTION  2013    Performed by Dony Bales M.D. at LABOR AND DELIVERY    GYN SURGERY  primary c section        TUBAL LIGATION       Social History     Tobacco Use    Smoking status: Never    Smokeless tobacco: Never   Substance Use Topics    Alcohol use: Yes     Comment: 4/month or less     Family and Occupational History     Socioeconomic History    Marital status:      Spouse name: Not on file    Number of children: Not on file    Years of education: Not on file    Highest education level: Some college, no degree   Occupational History    Not on file       Objective     Observations     Additional Observation Details  Flat back, otherwise unremarkable    Palpation   Left   Tenderness of the lumbar paraspinals.     Right   Tenderness of the lumbar paraspinals.     Tenderness     Left Hip   Tenderness in the PSIS.      Right Hip   Tenderness in the PSIS and sacroiliac joint.     Additional Tenderness Details  L1 SP P+    Active Range of Motion     Lumbar   Flexion: within functional limits  "(p.1)  Extension: decreased (0 deg. 'feels good')  Left lateral flexion: within functional limits  Right lateral flexion: decreased (75% of R)  Left rotation: within functional limits  Left Hip   Flexion: WFL  Abduction: 35 degrees with pain  Adduction: WFL  External rotation (90/90): WFL  Internal rotation (90/90): WFL    Right Hip   Flexion: WFL  Abduction: 35 degrees with pain  Adduction: WFL  External rotation (90/90): WFL  Internal rotation (90/90): WFL    Strength:      Left Hip   Planes of Motion   Flexion: 3+ (*P1)    Right Hip   Planes of Motion   Flexion: 3+ (* p1)        Therapeutic Exercises (CPT 77666):     1. SKTC, 5x5\", *w/or w/out strap    2. LTR, 5x5\", *    3. HSS w/strap, 5x5\", *    4. Ab brace, 5x5\", *      Therapeutic Exercise Summary: *HEP Ref code: [email or web address]      Time-based treatments/modalities:           Assessment, Response and Plan:   Impairments: lacks appropriate home exercise program and pain with function    Assessment details:  Pt. Is a 39 y.o. F who presents with above impairments and S:  I: mild N: LBP, hip weakness, S: subacute. Pt. Is appropriate for skilled PT intervention to improve functional limitations and reach goals.     Barriers to therapy:  None  Goals:   Short Term Goals:   Increase AROM of L/S bend to 35, E to 25 degrees, respectively ( in accordance w/ AAOS)  Follow a HEP/self care techniques 5x/wk to be able to lower pain on own  Short term goal time span:  2-4 weeks      Long Term Goals:    Pt. able to progress to Level 2 core stabilization exercise  MCID score on Lorenzo Doug LBP Qu. by 6 points  RTW improving tolerance to computer/desk w/supportive seat surface       Long term goal time span:  4-6 weeks    Plan:   Therapy options:  Physical therapy treatment to continue  Planned therapy interventions:  Therapeutic Exercise (CPT 17457) and Self Care ADL Training (CPT 22675)  Frequency:  2x week  Duration in visits:  12  Discussed with:  Patient      Functional " Assessment Used  Lorenzo Doug Low Back Pain and Disability Score: 45.83     Referring provider co-signature:  I have reviewed this plan of care and my co-signature certifies the need for services.    Certification Period: 03/31/2025 to  05/26/25    Physician Signature: ________________________________ Date: ______________

## 2025-04-02 ENCOUNTER — APPOINTMENT (OUTPATIENT)
Dept: INTERNAL MEDICINE | Facility: OTHER | Age: 40
End: 2025-04-02
Payer: MEDICAID

## 2025-04-16 ENCOUNTER — APPOINTMENT (OUTPATIENT)
Dept: INTERNAL MEDICINE | Facility: OTHER | Age: 40
End: 2025-04-16
Payer: MEDICAID

## 2025-04-17 ENCOUNTER — PHYSICAL THERAPY (OUTPATIENT)
Dept: PHYSICAL THERAPY | Facility: REHABILITATION | Age: 40
End: 2025-04-17
Payer: MEDICAID

## 2025-04-17 DIAGNOSIS — M54.50 LUMBAR PAIN: ICD-10-CM

## 2025-04-17 PROCEDURE — 97110 THERAPEUTIC EXERCISES: CPT

## 2025-04-17 PROCEDURE — 97535 SELF CARE MNGMENT TRAINING: CPT

## 2025-04-17 NOTE — OP THERAPY DAILY TREATMENT
"Outpatient Physical Therapy  DAILY TREATMENT     [facility] Outpatient Physical Therapy [place]  [address], [address]  [place] [address]  Phone:  [phone]    Date: 04/17/2025    Patient: Catherine Porter  YOB: 1985  MRN: [id number]     Time Calculation    Start time: 1530  Stop time: 1615 Time Calculation (min): 45 minutes         Chief Complaint: No chief complaint on file.    Visit #: 2    SUBJECTIVE:  Is doing a little better, she cannot dayanna. Sitting on soft surfaces. She is doing the HEP (hip stretching).  RTW:  May 1st   Pain rating (1-10):  4  Location: 1. L/S ,  2. tailbone  Descriptors: 1. Pressure, sharp  Im 2. sit   Aggravated with night/sleep, with setting technique 5-10 minutes.  Eases with bracing corset-style, heat    OBJECTIVE:  Current objective measures:  Extension: 0-15 deg.  Right lateral flexion: decreased (75% of R) P+          Therapeutic Exercises (CPT 38737):     2. ab brace -TvA, 10, VC sequencing    3. clam shell, L, R 10 GTB, *    4. TvA bridge w/adductor squeeze, 8, *    5. wall PPU, wall thred the needle, 5x5\" ea, *    6. L hand on wall pelvic shift ( Rbend), 5 x10-15\", *      Therapeutic Exercise Summary: * Hep [ref code: [id number]]        Therapeutic Treatments and Modalities:     1. Self Care ADL Training (CPT 11161), hep instruciton handout    Time-based treatments/modalities:    Physical Therapy Timed Treatment Charges  Functional training, self care minutes (CPT 13225): 15 minutes  Therapeutic exercise minutes (CPT 17888): 30 minutes    ASSESSMENT:   Response to treatment: well w/good RD of  breath sequence w/TvA recruitment. More hep assigned for strenghening    PLAN/RECOMMENDATIONS:   Plan for treatment: therapy treatment to continue next visit.  Planned interventions for next visit: continue with current treatment.         "

## 2025-04-21 DIAGNOSIS — E55.9 VITAMIN D DEFICIENCY: ICD-10-CM

## 2025-04-21 NOTE — TELEPHONE ENCOUNTER
Received request via: Pharmacy    Was the patient seen in the last year in this department? Yes    Does the patient have an active prescription (recently filled or refills available) for medication(s) requested? No    Pharmacy Name: [facility]    Does the patient have correction Plus and need 100-day supply? (This applies to ALL medications) Patient does not have SCP

## 2025-04-22 ENCOUNTER — PHYSICAL THERAPY (OUTPATIENT)
Dept: PHYSICAL THERAPY | Facility: REHABILITATION | Age: 40
End: 2025-04-22
Payer: MEDICAID

## 2025-04-22 DIAGNOSIS — M54.50 LUMBAR PAIN: ICD-10-CM

## 2025-04-22 DIAGNOSIS — S32.010A CLOSED COMPRESSION FRACTURE OF BODY OF L1 VERTEBRA (HCC): ICD-10-CM

## 2025-04-22 PROCEDURE — 97110 THERAPEUTIC EXERCISES: CPT

## 2025-04-22 NOTE — OP THERAPY DAILY TREATMENT
"Outpatient Physical Therapy  DAILY TREATMENT     [facility] Physical Therapy [place] [address] [facility], [address]  [place] [address]  Phone:  [phone]    Date: 04/22/2025    Patient: Catherine Porter  YOB: 1985  MRN: [id number]     Time Calculation    Start time: 1449  Stop time: 1530 Time Calculation (min): 41 minutes         Chief Complaint: Back Problem    Visit #: 3    SUBJECTIVE:  She cannot dayanna. Sitting prolonged still, she was able to get HR to work on getting a standing desk.  She is doing the HEP (hip stretching). Lifting 10# or> is difficult for her. Pt. Has 5, 8, 10# DB  RTW:  May 1st   Pain rating (1-10):  4  Location: 1. L/S ,  2. tailbone  Descriptors: 1. Pressure, sharp  Im 2. sit   Aggravated with night/sleep, with setting technique 5-10 minutes.  Eases with bracing corset-style, heat    OBJECTIVE:  Current objective measures:  Extension: 0-20 deg.  Right lateral flexion: decreased (85% of R)          Therapeutic Exercises (CPT 63712):     1. TM walking, 10' 2.0 mph    2. ab brace -TvA, 10, VC sequencing    3. clam shell, L, R 10 GTB, *    4. TvA bridge w/adductor squeeze, 8, *    5. wall PPU, wall thred the needle, 5x5\" ea, *    6. L hand on wall pelvic shift ( Rbend), 5 x10-15\", *    7. SLR  L, R, 14 pinkTB    8. S/L hip ABD, 2x10 PiTB    9. Squat bench, 2x10    10. biceps curls, 105#    11. bent over row, 105#    12. 1/2 kneel triceps, 10 5#    13. upright row, 14 5#      Therapeutic Exercise Summary: * Hep [ref code: [id number]]        Therapeutic Treatments and Modalities:     1. Self Care ADL Training (CPT 14902), hep instruction handout    Time-based treatments/modalities:    Physical Therapy Timed Treatment Charges  Therapeutic exercise minutes (CPT 75059): 45 minutes    ASSESSMENT:   Response to treatment: well w/good tolerance to walking and PREs.    PLAN/RECOMMENDATIONS:   Plan for treatment: therapy treatment to continue next visit.  Planned interventions for next " visit: add PREs to hep continue with current treatment.

## 2025-04-23 DIAGNOSIS — G44.209 TENSION HEADACHE: ICD-10-CM

## 2025-04-23 DIAGNOSIS — R73.03 PREDIABETES: ICD-10-CM

## 2025-04-23 NOTE — TELEPHONE ENCOUNTER
Received request via: Pharmacy    Was the patient seen in the last year in this department? Yes    Does the patient have an active prescription (recently filled or refills available) for medication(s) requested? No    [facility]: [facility] [place]    Does the patient have [facility] Plus and need 100-day supply? (This applies to ALL medications) Patient does not have SCP

## 2025-04-24 ENCOUNTER — PHYSICAL THERAPY (OUTPATIENT)
Dept: PHYSICAL THERAPY | Facility: REHABILITATION | Age: 40
End: 2025-04-24
Payer: MEDICAID

## 2025-04-24 DIAGNOSIS — M54.50 LUMBAR PAIN: ICD-10-CM

## 2025-04-24 PROCEDURE — 97110 THERAPEUTIC EXERCISES: CPT

## 2025-04-24 PROCEDURE — 97535 SELF CARE MNGMENT TRAINING: CPT

## 2025-04-24 RX ORDER — IBUPROFEN 800 MG/1
800 TABLET, FILM COATED ORAL 3 TIMES DAILY PRN
Qty: 30 TABLET | Refills: 0 | Status: SHIPPED | OUTPATIENT
Start: 2025-04-24

## 2025-04-24 RX ORDER — ERGOCALCIFEROL 1.25 MG/1
50000 CAPSULE ORAL
Qty: 12 CAPSULE | Refills: 0 | Status: SHIPPED | OUTPATIENT
Start: 2025-04-24

## 2025-04-24 RX ORDER — METFORMIN HYDROCHLORIDE 500 MG/1
500 TABLET, EXTENDED RELEASE ORAL DAILY
Qty: 90 TABLET | Refills: 0 | Status: SHIPPED | OUTPATIENT
Start: 2025-04-24

## 2025-04-24 NOTE — TELEPHONE ENCOUNTER
Received request via: Patient    Was the patient seen in the last year in this department? Yes    Does the patient have an active prescription (recently filled or refills available) for medication(s) requested? No    [facility]: [facility]    Does the patient have [facility] Plus and need 100-day supply? (This applies to ALL medications) Patient does not have SCP

## 2025-04-24 NOTE — OP THERAPY DAILY TREATMENT
"Outpatient Physical Therapy  DAILY TREATMENT     [facility] Outpatient Physical Therapy [place] [address], [address]  [place] [address]  Phone:  [phone]    Date: 04/24/2025    Patient: Catherine Porter  YOB: 1985  MRN: [id number]     Time Calculation    Start time: 1530  Stop time: 1615 Time Calculation (min): 45 minutes         Chief Complaint: Back Injury    Visit #: 4    SUBJECTIVE:  She is sore today in the thighs.   she was able to get HR to work on getting a standing desk.  She is doing the HEP (hip stretching). Lifting 10# or> is difficult for her. Pt. Has 5, 8, 10# DB  RTW:  May 1st   Pain rating (1-10):  4  Location: 1. L/S ,  2. tailbone  Descriptors: 1. Pressure, sharp  Im 2. sit   Aggravated with night/sleep, with setting technique 5-10 minutes.  Eases with bracing corset-style, heat    OBJECTIVE:  Current objective measures:  Extension: 0-20 deg.  Right lateral flexion: decreased (85% of R)          Therapeutic Exercises (CPT 83467):     1. TM walking, 10' 2.0 mph    2. ab brace -TvA, 10, VC sequencing    3. clam shell, L, R 10 GTB, *    4. TvA bridge w/adductor squeeze, 8, *    5. wall PPU, wall thred the needle, 5x5\" ea, *    6. L hand on wall pelvic shift ( Rbend), 5 x10-15\", *    7. SLR  L, R, 14 pinkTB    8. S/L hip ABD, 2x10 PiTB    9. Squat bench, 2x10, ^    10. biceps curls, 10, 5#, ^    11. bent over row, 10,5#, ^    12. 1/2 kneel triceps, 10, 5#, ^    13. upright row, 14 5#    14. farmer carries, 4 laps 8#, ^      Therapeutic Exercise Summary: ^Hep [ref: [id number]]        Therapeutic Treatments and Modalities:     1. Self Care ADL Training (CPT 71271), hep instruction handout    Time-based treatments/modalities:    Physical Therapy Timed Treatment Charges  Functional training, self care minutes (CPT 95425): 15 minutes  Therapeutic exercise minutes (CPT 34604): 30 minutes    ASSESSMENT:   Response to treatment: Pt. returns correct demo of the HEP to carryout correctly " at home with the instructional handout.      PLAN/RECOMMENDATIONS:   Plan for treatment: therapy treatment to continue next visit.  Planned interventions for next visit:  continue with current treatment.

## 2025-04-29 ENCOUNTER — PHYSICAL THERAPY (OUTPATIENT)
Dept: PHYSICAL THERAPY | Facility: REHABILITATION | Age: 40
End: 2025-04-29
Payer: MEDICAID

## 2025-04-29 DIAGNOSIS — M54.50 LUMBAR PAIN: ICD-10-CM

## 2025-04-29 PROCEDURE — 97110 THERAPEUTIC EXERCISES: CPT

## 2025-04-29 NOTE — OP THERAPY DAILY TREATMENT
"Outpatient Physical Therapy  DAILY TREATMENT     [facility] Outpatient Physical Therapy [place] [address] [address] [facility], [address]  [place] [address]  Phone:  [phone]    Date: 04/29/2025    Patient: Catherine Porter  YOB: 1985  MRN: [id number]     Time Calculation    Start time: 0845  Stop time: 0930 Time Calculation (min): 45 minutes         Chief Complaint: No chief complaint on file.    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     Starts working on Thurs w/ a standing desk.    She is doing the HEP (hip stretching). Lifting 10# or> is difficult for her. Pt. Has 5, 8, 10# DB  RTW:  May 1st   Pain rating (1-10):   Location: 1. L/S ,  2. tailbone  Descriptors: 1. Pressure, sharp  Im 2. sit   Aggravated with night/sleep, with setting technique 5-10 minutes.  Eases with bracing corset-style, heat    OBJECTIVE:  Current objective measures:  Extension: 0-20 deg.  Right lateral flexion: decreased (85% of R)          Therapeutic Exercises (CPT 78558):     1. TM walking, 10' 2.0 mph    2. dead bug cross, 10 modified w/UBENJA, augusto, VC sequencing    3. bird dog/qped UE-LE ext., L, R 8    4. bridge w/adductor squeeze, 2x 50- 60 sec. hold, *    5. wall PPU, wall thred the needle, 5x5\" ea, *NT    6. L hand on wall pelvic shift ( Rbend), 5 x10-15\", *NT    7. SLR  L, R, 14 pinkTB, NT    8. S/L hip ABD, 2x10 PiTB, Nt    9. Squat /hip hinge, 2x10, ^NT    10. biceps curls, 10, 5#, ^NT    11. bent over row, 10,5#, ^NT    12. 1/2 kneel triceps, 10, 5#, ^NT    13. upright row, 14 5#, NT    14. farmer carries, 4 laps 8#, ^NT    15. Gonsalo deadlift, 10 2# wand, Augusto, VC for sequence      Therapeutic Exercise Summary: ^Hep [ref: [id number]]        Therapeutic Treatments and Modalities:     1. Self Care ADL Training (CPT 87586), 3.0-4.3 MPH HIIT for interval 1' fast 4' regular speeds.x 10' total    Time-based treatments/modalities:    Physical " Therapy Timed Treatment Charges  Therapeutic exercise minutes (CPT 08900): 45 minutes    ASSESSMENT:   Response to treatment: Pt.tolerant to progressions      PLAN/RECOMMENDATIONS:   Plan for treatment: therapy treatment to continue next visit.  Planned interventions for next visit: +hep  continue with current treatment.

## 2025-05-02 ENCOUNTER — PHYSICAL THERAPY (OUTPATIENT)
Dept: PHYSICAL THERAPY | Facility: REHABILITATION | Age: 40
End: 2025-05-02
Payer: MEDICAID

## 2025-05-02 DIAGNOSIS — M54.50 LUMBAR PAIN: ICD-10-CM

## 2025-05-02 PROCEDURE — 97110 THERAPEUTIC EXERCISES: CPT

## 2025-05-02 NOTE — OP THERAPY DAILY TREATMENT
"Outpatient Physical Therapy  DAILY TREATMENT     [facility] Outpatient Physical Therapy [place] [address] [address] [facility], [address]  [place] [address]  Phone:  [phone]    Date: 05/02/2025    Patient: Catherine Porter  YOB: 1985  MRN: [id number]     Time Calculation    Start time: 0845  Stop time: 0930 Time Calculation (min): 45 minutes         Chief Complaint: No chief complaint on file.    Visit #: 6    SUBJECTIVE:  Pt states her first day of work was 'hectic.'  I am sore from Mon.'s treatment. LBP that spreads up mid-EOD currently. She tried the hep - superwoman's and wall stretches.     Starts working on Thurs w/ a standing desk.    She is doing the HEP (hip stretching). Lifting 10# or> is difficult for her. Pt. Has 5, 8, 10# DB  RTW:  May 1st   Pain rating (1-10):   Location: 1. L/S ,  2. tailbone  Descriptors: 1. Pressure, sharp  Im 2. sit   Aggravated with night/sleep, with setting technique 5-10 minutes.  Eases with bracing corset-style, heat    OBJECTIVE:  Current objective measures:  Extension: 0-20 deg.  Right lateral flexion: decreased (85% of R)          Therapeutic Exercises (CPT 45035):     1. TM walking, 10' 2.0 mph    2. dead bug cross, 10 modified w/UE, demo, VC sequencing    3. bird dog/qped UE-LE ext., L, R 8    4. bridge w/adductor squeeze, 2x60 sec. hold, *    5. wall PPU, wall thred the needle, 5x5\" ea, *NT    6. L hand on wall pelvic shift ( Rbend), 5 x10-15\", *NT    7. SLR  L, R, 14 pinkTB, NT    8. S/L hip ABD, 2x10 PiTB, Nt    9. Squat /hip hinge, 2x10, ^    10. biceps curls, 10, 8#, ^    11. bent over row, 10,8#, ^    12. 1/2 kneel triceps, 10, 5#, ^    13. upright row, 14 5#    14. farmer carries, 6 laps 8#, ^    15. Gonsalo deadlift, 10 2# wand, Demo,  for sequence      Therapeutic Exercise Summary: ^Hep [ref: [id number]]        Therapeutic Treatments and Modalities:     1. Self Care ADL Training (CPT 50786), 2.8-3.8 MPH HIIT for interval 1' fast 4' regular speeds.x 10' " total    Time-based treatments/modalities:    Physical Therapy Timed Treatment Charges  Therapeutic exercise minutes (CPT 39758): 45 minutes    ASSESSMENT:   Response to treatment: Pt. Handles more resistance in [place] today; Pt. returns correct demo of the HEP to carryout correctly at home with the instructional handout.         PLAN/RECOMMENDATIONS:   Plan for treatment: therapy treatment to continue next visit.  Planned interventions for next visit:  email hep  continue with current treatment.

## 2025-05-07 ENCOUNTER — PHYSICAL THERAPY (OUTPATIENT)
Dept: PHYSICAL THERAPY | Facility: REHABILITATION | Age: 40
End: 2025-05-07
Payer: MEDICAID

## 2025-05-07 DIAGNOSIS — M54.50 LUMBAR PAIN: ICD-10-CM

## 2025-05-07 PROCEDURE — 97110 THERAPEUTIC EXERCISES: CPT

## 2025-05-07 PROCEDURE — 97535 SELF CARE MNGMENT TRAINING: CPT

## 2025-05-07 NOTE — OP THERAPY DAILY TREATMENT
"  Outpatient Physical Therapy  DAILY TREATMENT     [facility] Outpatient Physical Therapy [address] [facility], [address]  [place] [address]  Phone:  [phone]    Date: 05/07/2025    Patient: Catherine Porter  YOB: 1985  MRN: [id number]     Time Calculation    Start time: 0845  Stop time: 0940 Time Calculation (min): 55 minutes         Chief Complaint: No chief complaint on file.    Visit #: 7    SUBJECTIVE:  \"Pt states they don't have my standing desk yet. I will call today to check. I have LBP from sitting 2-3 hours in.  When I get home I have to use my heating pain. \"     Lifting 10# or> is difficult for her. Pt. Has 5, 8, 10# DB  RTW:  May 1st   Pain rating (1-10) before treatment:  2  Location: 1. L/S,  2. tailbone  Descriptors: 1. Pressure, sharp  Im 2. sit   Aggravated with working/sitting prolonged, night/sleep  Eases with heat, HEP    OBJECTIVE:  Current objective measures:           Therapeutic Exercises (CPT 20747):     1. TM walking, 10' 2.0 mph    2. dead bug cross, 10 modified w/UE, new    3. bird dog/qped UE-LE ext., L, R 2x5, new    4. bridge w/adductor squeeze, 2x60 sec. hold, *    5. wall PPU, wall thred the needle, 5x5\" ea, *NT    6. L hand on wall pelvic shift ( Rbend), 5 x10-15\", *NT    7. seated march, 30 dynadisc, new    8. S/L hip ABD, 2x10 PiTB, NT    9. Squat /hip hinge, 2x10, ^    10. biceps curls, 10, 8#, ^    11. bent over row, 10,8#, ^    12. 1/2 kneel triceps, 10, 5#, ^    13. upright row, 14 5#    14. farmer carries, 6 laps 8#, ^    15. Gonsalo deadlift, 10 2# wand, Luis Alfredo, VC for sequence    16. hip flexor stretch near wall, 2x30\", new    17. superwoman, 2x1' 65 cm, new      Therapeutic Exercise Summary: 'New' hep [ref. [id number]] emailed to [email or web address]        Therapeutic Treatments and Modalities:     1. Self Care ADL Training (CPT 06954), hep instructions    Time-based treatments/modalities:    Physical Therapy Timed Treatment Charges  Functional training, " self care minutes (CPT 04367): 10 minutes  Therapeutic exercise minutes (CPT 67373): 45 minutes        Pain rating (1-10) after treatment:  0    ASSESSMENT:   Response to treatment: Pt. returns correct demo of the HEP to carryout correctly at home with the instructional handout.      PLAN/RECOMMENDATIONS:   Plan for treatment: therapy treatment to continue next visit.  Planned interventions for next visit: continue with current treatment.

## 2025-05-09 ENCOUNTER — PHYSICAL THERAPY (OUTPATIENT)
Dept: PHYSICAL THERAPY | Facility: REHABILITATION | Age: 40
End: 2025-05-09
Payer: MEDICAID

## 2025-05-09 DIAGNOSIS — M54.50 LUMBAR PAIN: ICD-10-CM

## 2025-05-09 PROCEDURE — 97110 THERAPEUTIC EXERCISES: CPT

## 2025-05-09 NOTE — OP THERAPY DAILY TREATMENT
"  Outpatient Physical Therapy  DAILY TREATMENT     [facility] Outpatient Physical Therapy [place] [address] [address] [facility], [address]  [place] [address]  Phone:  [phone]    Date: 05/09/2025    Patient: Catherine Porter  YOB: 1985  MRN: [id number]     Time Calculation    Start time: 0845  Stop time: 0930 Time Calculation (min): 45 minutes         Chief Complaint: No chief complaint on file.    Visit #: 8    SUBJECTIVE:  She is working from home and feels less stress. Pt. States she c/o R T/S tightness and wonders about a stretch. felt a sharp pain thru her.  She received hep via email, she tried the stretches as she was able to.     Pt. Has 5, 8, 10# DB  Pain rating (1-10) before treatment: 1-2  Location: 1. L/S,  2. tailbone  Descriptors: 1. Pressure, sharp  Im 2. sit   Aggravated with working/sitting prolonged, night/sleep  Eases with heat, HEP    OBJECTIVE:  Current objective measures:           Therapeutic Exercises (CPT 17746):     1. TM walking, 10' 2.0 mph    2. dead bug cross, 10 modified w/UE, new    3. bird dog/qped UE-LE, L, R 2x5-6 w/ ball, new    4. bridge w/adductor squeeze, 2x60 sec. hold, *NT    5. wall PPU, wall thred the needle, 5x5\" ea, *NT    6. L hand on wall pelvic shift ( Rbend), 5 x10-15\", *NT    7. seated ball march, 30, new    8. S/L hip ABD, 2x10 PiTB, NT    9. Squat /hip hinge, 2x10, ^NT    10. biceps curls, 10, 8#, ^NT    11. bent over row, 10,8#, ^NT    12. 1/2 kneel triceps, 10, 5#, ^NT    13. upright row, 14 5#, NT    14. farmer carries, 6 laps 8#, ^    15. Gonsalo deadlift, 10 2# wand, NTDemo, VC for sequence    16. hip flexor stretch near wall, 2x30\", new    17. superwoman, 2x1' 1x w/ arms ext. 65 cm, new    18. R middle traps stretch, 30\"      Therapeutic Exercise Summary: 'New' hep [ref. [id number]] emailed to [email or web address]        Therapeutic Treatments and Modalities:     1. Self Care ADL Training (CPT 22140), hep instructions    Time-based " treatments/modalities:    Physical Therapy Timed Treatment Charges  Therapeutic exercise minutes (CPT 28191): 45 minutes        Pain rating (1-10) after treatment:  0    ASSESSMENT:   Response to treatment: Pt. returns correct demo of the HEP to carryout correctly at home with the instructional handout.  Pt. Benefits from TE/hep and able to self manage sx. However at work she is subject to sitting prolonged which exacerbates her symptoms into her workday and into her evenings. She is waiting for the standing desk.      PLAN/RECOMMENDATIONS:   Plan for treatment: therapy treatment to continue next visit.  Planned interventions for next visit: continue with current treatment.

## 2025-05-13 ENCOUNTER — PHYSICAL THERAPY (OUTPATIENT)
Dept: PHYSICAL THERAPY | Facility: REHABILITATION | Age: 40
End: 2025-05-13
Payer: MEDICAID

## 2025-05-13 DIAGNOSIS — M54.50 LUMBAR PAIN: ICD-10-CM

## 2025-05-13 PROCEDURE — 97110 THERAPEUTIC EXERCISES: CPT

## 2025-05-13 NOTE — OP THERAPY DAILY TREATMENT
"  Outpatient Physical Therapy  DAILY TREATMENT     [facility] Physical Therapy [address] [facility], [address]  [place] [address]  Phone:  [phone]    Date: 05/13/2025    Patient: Catherine Porter  YOB: 1985  MRN: [id number]     Time Calculation      [id number]  45 Min.         Chief Complaint: No chief complaint on file.    Visit #: 9    SUBJECTIVE:  Yesterday I worked from home, and I used my heating pad intermittently. Pt. Went for hike and felt ok afterward. No news about standing desk.   She received hep via email, she tried the stretches as she was able to.     Pt. Has 5, 8, 10# DB  Pain rating (1-10) before treatment: 0  Location: 1. L/S,  2. tailbone  Descriptors: 1. Pressure, sharp  Im 2. sit   Aggravated with working/sitting prolonged, night/sleep  Eases with heat, HEP    OBJECTIVE:  Current objective measures:           Therapeutic Exercises (CPT 90739):     1. TM walking, 10' 2.0 mph, NT    2. dead bug cross, 10 modified w/UE, new    3. bird dog/qped UE-LE, L, R 10-12 w/ ball, new    4. bridge w/adductor squeeze, 2x60 sec. hold, *NT    5. wall PPU, wall thred the needle, 5x5\" ea, *NT    6. L hand on wall pelvic shift ( Rbend), 5 x10-15\", *NT    7. seated ball march, 30, new    8. S/L hip ABD, 2x10 PiTB, NT    9. Squat /hip hinge, 10 11# KB, good form    10. biceps curls, 10, 8#, ^NT    11. bent over row, 10,8#, ^NT    12. 1/2 kneel triceps, 10, 5#, ^NT    13. upright row, 14 5#, NT    14. farmer carries, 6 laps 8#, ^    15. Gonsalo deadlift, 12 2# wand; 5 8#, Augusto, BFB for body mechanics    16. hip flexor stretch near wall, 2x30\", new, demo's I    17. superwoman, 2x1' 1x w/ arm support and  ext. on ball: 65 cm, new, demo's I    18. R middle traps stretch, 30\", augusto's I      Therapeutic Exercise Summary: 'New' hep [ref. [id number]] emailed to [email or web address]        Therapeutic Treatments and Modalities:     1. Self Care ADL Training (CPT 04261), hep " instructions    Time-based treatments/modalities:             Pain rating (1-10) after treatment:  0    ASSESSMENT:   Response to treatment: Pt. returns correct demo of the  new HEP.  Pt. Benefits from TE/hep and able to self manage sx. however at work she is subject to sitting prolonged which exacerbates her symptoms into her workday and into her evenings. She is waiting for the standing desk.      PLAN/RECOMMENDATIONS:   Plan for treatment: therapy treatment to continue next visit.  Planned interventions for next visit: continue with current treatment.

## 2025-05-16 ENCOUNTER — PHYSICAL THERAPY (OUTPATIENT)
Dept: PHYSICAL THERAPY | Facility: REHABILITATION | Age: 40
End: 2025-05-16
Payer: MEDICAID

## 2025-05-16 DIAGNOSIS — M54.50 LUMBAR PAIN: Primary | ICD-10-CM

## 2025-05-16 PROCEDURE — 97110 THERAPEUTIC EXERCISES: CPT

## 2025-05-16 NOTE — OP THERAPY DAILY TREATMENT
"  Outpatient Physical Therapy  DAILY TREATMENT     [facility] Outpatient Physical Therapy [address] [facility], [address]  [place] [address]  Phone:  [phone]    Date: 05/16/2025    Patient: Catherine Porter  YOB: 1985  MRN: [id number]     Time Calculation    Start time: 0845  Stop time: 0930 Time Calculation (min): 45 [id number] Min.         Chief Complaint: No chief complaint on file.    Visit #: 10    SUBJECTIVE:  My standing desk came! My last hour I stood, and yesterday I stood all day.  I am doing the wall stretches IM. Mon. & Fri. I work from home. Pt. Still feeling tail bone pain w/certain movements and sit prolonged w/out doughnut pillow  Pt. has 5, 8, 10# DB  Pain rating (1-10) before treatment: 0  Location: 1. L/S,  2. tailbone  Descriptors: 1. Pressure, sharp  Im 2. Occasional  Aggravated with working/sitting prolonged, night/sleep  Eases with heat, HEP    OBJECTIVE:  Current objective measures:           Therapeutic Exercises (CPT 28778):     1. upright bike seat 8, 8': 30\" 4.0 mph    2. dead bug cross, 2x10 modified w/UE, new    3. bird dog/qped UE-LE, L, R 2x 10-12 w/ ball, new    4. bridge w/adductor squeeze, 1x20 sec. hold, *    5. wall PPU, wall thred the needle, 5x5\" ea, *NT    6. L hand on wall pelvic shift ( Rbend), 5 x10-15\", *NT    7. seated ball march, 30, new    8. S/L hip ABD, 2x10 PiTB, NT    9. Squat /hip hinge, 10 11# KB, VC for good form    10. biceps curls, 10, 8#, ^NT    11. bent over row, 10,8#, ^NT    12. 1/2 kneel triceps, 10, 5#, ^NT    13. upright row, 14 5#, NT    14. farmer carries, 6 laps 11#    15. Gonsalo deadlift, 12 2# wand; 5 8#, nt    16. hip flexor stretch near wall, 2x30\", new, demo's I    17. superwoman, 2x1' 1x w/ arm support and  ext. on ball: 65 cm, new, demo's I    18. R middle traps stretch, 30\"mely      Therapeutic Exercise Summary: 'New' hep [ref. [id number]] emailed to [email or web address]        Therapeutic Treatments and Modalities: "     1. Self Care ADL Training (CPT 31778), hep instructions    Time-based treatments/modalities:    Physical Therapy Timed Treatment Charges  Therapeutic exercise minutes (CPT 13554): 45 minutes        Pain rating (1-10) after treatment:  0    ASSESSMENT:   Response to treatment:   Pt. Benefits from TE/hep and able to self manage sx. Pt. Got her desk at work so will see how sx are w/ work week.     PLAN/RECOMMENDATIONS:   Plan for treatment: therapy treatment to continue next visit.  Planned interventions for next visit: 1x/wk x 2 wks , likely d/ccontinue with current treatment.

## 2025-05-20 ENCOUNTER — APPOINTMENT (OUTPATIENT)
Dept: PHYSICAL THERAPY | Facility: REHABILITATION | Age: 40
End: 2025-05-20
Payer: MEDICAID

## 2025-05-21 ENCOUNTER — APPOINTMENT (OUTPATIENT)
Dept: INTERNAL MEDICINE | Facility: OTHER | Age: 40
End: 2025-05-21
Payer: MEDICAID

## 2025-05-21 VITALS
SYSTOLIC BLOOD PRESSURE: 162 MMHG | WEIGHT: 247 LBS | HEART RATE: 76 BPM | BODY MASS INDEX: 39.7 KG/M2 | DIASTOLIC BLOOD PRESSURE: 94 MMHG | HEIGHT: 66 IN | TEMPERATURE: 97.7 F | OXYGEN SATURATION: 97 %

## 2025-05-21 DIAGNOSIS — R03.0 ELEVATED BLOOD PRESSURE READING WITHOUT DIAGNOSIS OF HYPERTENSION: Primary | ICD-10-CM

## 2025-05-21 DIAGNOSIS — G43.109 MIGRAINE WITH AURA AND WITHOUT STATUS MIGRAINOSUS, NOT INTRACTABLE: ICD-10-CM

## 2025-05-21 RX ORDER — AMLODIPINE BESYLATE 5 MG/1
5 TABLET ORAL DAILY
Qty: 100 TABLET | Refills: 3 | Status: SHIPPED | OUTPATIENT
Start: 2025-05-21 | End: 2026-06-25

## 2025-05-21 ASSESSMENT — ENCOUNTER SYMPTOMS
CONSTITUTIONAL NEGATIVE: 1
CARDIOVASCULAR NEGATIVE: 1
BACK PAIN: 1
EYES NEGATIVE: 1
RESPIRATORY NEGATIVE: 1
PSYCHIATRIC NEGATIVE: 1
HEADACHES: 1
GASTROINTESTINAL NEGATIVE: 1

## 2025-05-21 ASSESSMENT — PAIN SCALES - GENERAL: PAINLEVEL_OUTOF10: 4=SLIGHT-MODERATE PAIN

## 2025-05-21 NOTE — PROGRESS NOTES
ESTABLISHED PATIENT VISIT    PATIENT ID:  Name: Catherine Porter  YOB: 1985  Patient Care Team:  Rian Boyd M.D. as PCP - General (Internal Medicine)    CHIEF COMPLAINT(s):  Follow-Up (Prediabetes )    Follow up for The primary encounter diagnosis was Elevated blood pressure reading without diagnosis of hypertension. A diagnosis of Migraine with aura and without status migrainosus, not intractable was also pertinent to this visit.    History of Present Illness:    This is a pleasant 39 y.o. female  With past medical history of prediabetes, MARY on CPAP, dyslipidemia.    Since last visit, patient experienced a significant fall in February which led to L1 compression fracture and S5 sacral transverse fracture. She is following with [facility] for pain management and physical therapy.  She states that her symptoms have been improving, however she is still experiencing chronic pain that is leading to significant stress and elevated blood pressure.    Today she was found to have multiple readings of elevated blood pressure.  Left forearm bleeding showed 160/108 and 160/110 on repeat, right arm showed 153/101 162/94 on repeat.  Patient has never been previously diagnosed with hypertension.  Is likely attributed to chronic NSAID use, ongoing back pain, and increased stressors may be sequelae of her recent fall with a financial hardships.    Given multiple readings greater than 160 /100 , is likely the patient is chronically hypertensive and shared decision was made to initiate amlodipine 5 mg daily.  Patient was also instructed to keep a log of her blood pressure at home with daily checks for us to review next visit.  She states that she may be planning to move to [place] and would like to get her health in order before her big move, so she will follow-up in 3 weeks with another provider in the clinic.  She was instructed to look out for side effects of amlodipine such as orthostatic  "hypotension, dizziness, constipation, and leg swelling and to report to us if she is experiencing of the symptoms.    She also states that she has ongoing migraines that occur about once monthly.  She states that they are well-controlled with ibuprofen, was offered sumatriptan for abortive therapy, however patient stated that she would like to continue with ibuprofen as needed as it works for her.    Patient Active Problem List    Diagnosis Date Noted    MDD (major depressive disorder), recurrent episode, moderate ([facility]) 12/12/2024    Vulvar cancer ([facility]) 12/12/2024    MARY on CPAP 03/20/2023    Morbid obesity ([facility]) 03/20/2023    Prediabetes 02/06/2019    Dyslipidemia 02/06/2019    Vitamin D deficiency 08/27/2015    Anxiety 07/16/2015     Review of Systems   Constitutional: Negative.    HENT: Negative.     Eyes: Negative.    Respiratory: Negative.     Cardiovascular: Negative.    Gastrointestinal: Negative.    Genitourinary: Negative.    Musculoskeletal:  Positive for back pain.   Skin: Negative.    Neurological:  Positive for headaches.   Endo/Heme/Allergies: Negative.    Psychiatric/Behavioral: Negative.       Past Medical History[1]  Past Surgical History[2]  Family History   Problem Relation Age of Onset    Hypertension Mother     Arthritis Mother     Diabetes Father     Heart Disease Father     Heart Attack Father     Breast Cancer Maternal Aunt     Breast Cancer Maternal Grandmother     Cancer Maternal Grandmother         breast     Patient has no known allergies.  Social History[3]  Current Medications[4]    OBJECTIVE:  BP (!) 162/94 (BP Location: Right arm, Patient Position: Sitting, BP Cuff Size: Adult long) Comment: repeated  Pulse 76   Temp 36.5 °C (97.7 °F) (Temporal)   Ht 1.676 m (5' 6\")   Wt 112 kg (247 lb)   SpO2 97%   BMI 39.87 kg/m²   Physical Exam  Constitutional:       Appearance: Normal appearance. She is obese.   HENT:      Head: Normocephalic.      Right Ear: External ear normal.      Left " Ear: External ear normal.      Nose: Nose normal.      Mouth/Throat:      Mouth: Mucous membranes are moist.   Cardiovascular:      Rate and Rhythm: Normal rate and regular rhythm.      Pulses: Normal pulses.      Heart sounds: Normal heart sounds.   Pulmonary:      Effort: Pulmonary effort is normal.      Breath sounds: Normal breath sounds.   Abdominal:      General: Abdomen is flat. Bowel sounds are normal.   Musculoskeletal:         General: Tenderness (midline lumbar spinal tenderness) present. Normal range of motion.   Skin:     General: Skin is warm.      Capillary Refill: Capillary refill takes less than 2 seconds.   Neurological:      General: No focal deficit present.      Mental Status: She is alert. Mental status is at baseline.   Psychiatric:         Thought Content: Thought content normal.         Judgment: Judgment normal.       ASSESSMENT AND PLAN:     1. Elevated blood pressure reading without diagnosis of hypertension (Primary)  Left forearm bleeding showed 160/108 and 160/110 on repeat, right arm showed 153/101 162/94 on repeat.  Patient has never been previously diagnosed with hypertension.  Is likely attributed to chronic NSAID use, ongoing back pain, and increased stressors may be sequelae of her recent fall with a financial hardships.  - Instructed to log blood pressure at home first review next visit in around 3 weeks.  Patient is planning to move to [place] so would like to be evaluated before her big move.  - amLODIPine (NORVASC) 5 MG Tab; Take 1 Tablet by mouth every day.  Dispense: 100 Tablet; Refill: 3    2. Migraine with aura and without status migrainosus, not intractable  States that she has ongoing migraines that occur about once monthly.  She states that they are well-controlled with ibuprofen, was offered sumatriptan for abortive therapy, however patient stated that she would like to continue with ibuprofen as needed as it works for her.    Problem List Items Addressed This Visit     None  Visit Diagnoses         Elevated blood pressure reading without diagnosis of hypertension    -  Primary    Relevant Medications    amLODIPine (NORVASC) 5 MG Tab      Migraine with aura and without status migrainosus, not intractable        Relevant Medications    amLODIPine (NORVASC) 5 MG Tab          Orders Placed This Encounter    amLODIPine (NORVASC) 5 MG Tab     Return in about 3 weeks (around 6/11/2025) for follow up on blood pressure log.    Rian Boyd M.D. PGY-1  [facility] Internal Medicine Resident       [1]   Past Medical History:  Diagnosis Date    Hyperlipidemia LDL goal < 130 7/30/2015   [2]   Past Surgical History:  Procedure Laterality Date    WIDE EXCISION  12/20/2018    Procedure: WIDE EXCISION - LOCAL EXCISION OF VULVA;  Surgeon: Arya Quijano M.D.;  Location: SURGERY SAME DAY [facility];  Service: Gynecology    REPEAT C SECTION  12/23/2013    Performed by Dony Bales M.D. at [facility]    GYN SURGERY  primary c section    2010    TUBAL LIGATION     [3]   Social History  Tobacco Use    Smoking status: Never    Smokeless tobacco: Never   Vaping Use    Vaping status: Never Used   Substance Use Topics    Alcohol use: Yes     Comment: 4/month or less    Drug use: Not Currently     Types: Marijuana     Comment: cannabis gummies sometimes   [4]   Current Outpatient Medications   Medication Sig Dispense Refill    amLODIPine (NORVASC) 5 MG Tab Take 1 Tablet by mouth every day. 100 Tablet 3    ergocalciferol (DRISDOL) 43039 UNIT capsule Take 1 Capsule by mouth every 7 days. 12 Capsule 0    metFORMIN ER (GLUCOPHAGE XR) 500 MG TABLET SR 24 HR Take 1 tablet by mouth once daily 90 Tablet 0    ibuprofen (MOTRIN) 800 MG Tab TAKE 1 TABLET BY MOUTH THREE TIMES DAILY AS NEEDED FOR MODERATE PAIN 30 Tablet 0    tizanidine (ZANAFLEX) 4 MG Tab Take 1 Tablet by mouth every 6 hours as needed (PAIN). 30 Tablet 3    fexofenadine (EQ ALLERGY RELIEF) 180 MG tablet Take 1 Tablet by mouth every day. 90  Tablet 0    escitalopram (LEXAPRO) 10 MG Tab Take 1 Tablet by mouth every day. 90 Tablet 0    ferrous sulfate 325 (65 Fe) MG tablet Take 325 mg by mouth every day.       No current facility-administered medications for this visit.

## 2025-05-21 NOTE — PATIENT INSTRUCTIONS
- please log blood pressures for us to review next visit.  - will start low dose amlodipine 5mg.   - monitor for symptoms of low blood pressure such as dizziness, feeling faint.

## 2025-05-22 ENCOUNTER — APPOINTMENT (OUTPATIENT)
Dept: PHYSICAL THERAPY | Facility: REHABILITATION | Age: 40
End: 2025-05-22
Payer: MEDICAID

## 2025-05-22 ENCOUNTER — PHYSICAL THERAPY (OUTPATIENT)
Dept: PHYSICAL THERAPY | Facility: REHABILITATION | Age: 40
End: 2025-05-22
Payer: MEDICAID

## 2025-05-22 DIAGNOSIS — S32.010D COMPRESSION FRACTURE OF L1 VERTEBRA WITH ROUTINE HEALING, SUBSEQUENT ENCOUNTER: Primary | ICD-10-CM

## 2025-05-22 PROCEDURE — 97110 THERAPEUTIC EXERCISES: CPT

## 2025-05-22 PROCEDURE — 97535 SELF CARE MNGMENT TRAINING: CPT

## 2025-05-22 NOTE — OP THERAPY DAILY TREATMENT
"  Outpatient Physical Therapy  DAILY TREATMENT     [facility] [address], [address]  [facility] [address]  Phone:  [phone]    Date: 05/22/2025    Patient: Catherine Porter  YOB: 1985  MRN: [id number]     Time Calculation                   Chief Complaint: No chief complaint on file.    Visit #: 11    SUBJECTIVE:  Outpatient Physical Therapy  DAILY TREATMENT     [facility] [address], [address]  [facility] [address]  Phone:  [phone]    Date: 04/29/2025    Patient: Catherine Porter  YOB: 1985  MRN: [id number]     Time Calculation    Start time: 0845  Stop time: 0930 Time Calculation (min): 45 minutes         Chief Complaint: No chief complaint on file.    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 She had her tailbone pain - thru. Monday.  She is using her standing desk. Pt. States she saw her PCP.   Pt.      She is doing the HEP (hip stretching). Lifting 10# or> is difficult for her. Pt. Has 5, 8, 10# DB  RTW:  May 1st   Pain rating (1-10):   Location: 1. L/S ,  2. tailbone  Descriptors: 1. Pressure, sharp  Im 2. sit   Aggravated with night/sleep, with setting technique 5-10 minutes.  Eases with bracing corset-style, heat    OBJECTIVE:  Current objective measures:  Extension: 0-20 deg.  Right lateral flexion: decreased (85% of R)          Therapeutic Exercises (CPT 72606):     1. TM walking, 10' 2.0 mph    2. dead bug cross, 10 modified w/UE, demo, VC sequencing    3. bird dog/qped UE-LE ext., L, R 8    4. bridge w/adductor squeeze, 2x 50- 60 sec. hold, *    5. wall PPU, wall thred the needle, 5x5\" ea, *NT    6. L hand on wall pelvic shift ( Rbend), 5 x10-15\", *NT    7. SLR  L, R, 14 pinkTB, NT    8. S/L hip ABD, 2x10 PiTB, Nt    9. Squat /hip hinge, 2x10, ^NT    10. biceps curls, 10, 5#, ^NT    11. bent over row, 10,5#, ^NT    12. 1/2 kneel " "triceps, 10, 5#, ^NT    13. upright row, 14 5#, NT    14. farmer carries, 4 laps 8#, ^NT    15. Gonsalo deadlift, 10 2# Luis Alfredo joseph,  for sequence      Therapeutic Exercise Summary: ^Hep [ref: [id number]]        Therapeutic Treatments and Modalities:     1. Self Care ADL Training (CPT 33784), 3.0-4.3 MPH HIIT for interval 1' fast 4' regular speeds.x 10' total    Time-based treatments/modalities:    Physical Therapy Timed Treatment Charges  Therapeutic exercise minutes (CPT 01877): 45 minutes    ASSESSMENT:   Response to treatment: Pt.tolerant to progressions      PLAN/RECOMMENDATIONS:   Plan for treatment: therapy treatment to continue next visit.  Planned interventions for next visit: +hep  continue with current treatment.         OBJECTIVE:  Current objective measures: ***        Exercises/Treatment  Time-based treatments/modalities:           Pain rating (1-10) before treatment:  {PAIN NUMBERS_1-10:59722}  Pain rating (1-10) after treatment:  {PAIN NUMBERS_1-10:83880}    ASSESSMENT:   Response to treatment: ***    PLAN/RECOMMENDATIONS:   Plan for treatment: {AMB OP THERAPY - THERAPY PLAN:265970740::\"therapy treatment to continue next visit\"}.  Planned interventions for next visit: {PT PLANNED THERAPY INTERVENTIONS:087960310::\"continue with current treatment\"}.         "

## 2025-05-22 NOTE — OP THERAPY DAILY TREATMENT
"  Outpatient Physical Therapy  DAILY TREATMENT     [facility] [address], [address]  [place] [address]  Phone:  [phone]    Date: 2025    Patient: Catherine Porter  YOB: 1985  MRN: [id number]     Time Calculation    Start time: 0800  Stop time: 0845 Time Calculation (min): 45 [id number] Min.         Chief Complaint: No chief complaint on file.    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 She had her tailbone pain - thru. Monday.  She is using her standing desk. Pt. States she saw her PCP. In office her BP was elevated to the 160s, she begain taking BP medication to control it. This a.m. it was high and she took a dose, but didn't recheck yet.  Pt.  having I lot on her mind w/personal life decisions.      Pt. has 5, 8, 10# DB  Pain rating (1-10) before treatment: 0  Location: 1. L/S,  2. tailbone  Descriptors: 1. Pressure, sharp  Im 2. Occasional  Aggravated with working/sitting prolonged, night/sleep  Eases with heat, HEP    OBJECTIVE:  Current objective measures:   Emotionally labile initially then during Rx her affect improve.   BP check sittin/70 mmhg          Therapeutic Exercises (CPT 93013):     1. upright bike seat 8, 8': 30\" 4.0 mph    2. dead bug cross, 2x10 modified w/UE, new    3. bird dog/qped UE-LE, L, R 2x 10-12 w/ ball, new/NT    4. bridge w/adductor squeeze, 2x60\" sec. hold, *    5. wall PPU, wall thred the needle, 5x5\" ea, *NT    6. L hand on wall pelvic shift ( Rbend), 5 x10-15\", *NT    7. seated ball march, 30, new    8. S/L hip clam shell,, 2x10 blueTB, NT    9. Squat /hip hinge, 10 11 blue band, VC for good form    10. biceps curls, 10, 8#, ^NT    11. bent over row, 10,8#, ^NT    12. 1/2 kneel triceps, 10, 5#, ^NT    13. upright row, 14 5#, NT    14. farmer carries, 6 laps 11#    15. Gonsalo deadlift, 12 2# wand; 5 8#, nt    16. hip flexor stretch near wall, " "2x30\", new, demo's I    17. superwoman, 2x1' 1x w/ arm support and  ext. on ball: 65 cm, new, demo's I    18. R middle traps stretch, 30\", augusto's I    19. toy soldier, 2x5-10 BlueTB      Therapeutic Exercise Summary: 'New' hep [ref. [id number]] emailed to     [email or web address]        Therapeutic Treatments and Modalities:     1. Self Care ADL Training (CPT 15642), hep instructions    Time-based treatments/modalities:             Pain rating (1-10) after treatment:  0    ASSESSMENT:   Response to treatment:   Pt. Preoccupied today d/t personal  health challenges. Normalizing BP; tolerates lower level exercises. Anticipate D/C soon, she is set up w/standing desk and hep.  PLAN/RECOMMENDATIONS:   Plan for treatment: therapy treatment to continue next visit.  Planned interventions for next visit: likely d/ccontinue with current treatment.         "

## 2025-05-23 ENCOUNTER — APPOINTMENT (OUTPATIENT)
Dept: PHYSICAL THERAPY | Facility: REHABILITATION | Age: 40
End: 2025-05-23
Payer: MEDICAID

## 2025-05-27 ENCOUNTER — PHYSICAL THERAPY (OUTPATIENT)
Dept: PHYSICAL THERAPY | Facility: REHABILITATION | Age: 40
End: 2025-05-27
Payer: MEDICAID

## 2025-05-27 DIAGNOSIS — M54.50 LUMBAR PAIN: Primary | ICD-10-CM

## 2025-05-27 PROCEDURE — 97110 THERAPEUTIC EXERCISES: CPT

## 2025-05-27 PROCEDURE — 97535 SELF CARE MNGMENT TRAINING: CPT

## 2025-05-27 NOTE — OP THERAPY DAILY TREATMENT
"  Outpatient Physical Therapy  DAILY TREATMENT     [facility] Therapy [place] [address] [email or web address] [facility], [address]  [place] [address]  Phone:  [phone]    Date: 05/27/2025    Patient: Catherine Porter  YOB: 1985  MRN: [id number]     Time Calculation    Start time: 0800  Stop time: 0845 Time Calculation (min): 45 [id number] Min.         Chief Complaint: Back Problem    Visit #: 12    SUBJECTIVE:  Pt stated she came off working w/her standing desk 3 days and at home 2 days feeling 'good.'  She will see her trauma MD on 06/02 including a f/u Xray. She is compliant w/a progressed hep and walking 3x/week. She is happy with her progress.    Pain rating (1-10) before treatment: 0-3/10  Location: 1. L/S  2. tailbone  Descriptors: 1. Pressure, sharp  Im 2. Occasional  Aggravated sitting prolonged, night/sleep  Eases with heat, HEP    OBJECTIVE:  Current objective measures:   AROM L/S:   E: 30 deg. *  L bend 80%*//100%  Remaining planes wnl  Strength   Abdominals/ back extensors: able to maintain pelvic neutral during dynamic activity.    Palpation:    L1 G1 *P: 2-3/10  L2-3 G4 - clear  L4,5 G3- P (not her symptom, but feels a spread of pain across her back   L SIJ \"feels good\"  TTP L Tailbone, getting radiating sx in L thigh     Lorenzo Doug Low Back Pain and Disability Score: 50   SOC: 45.83    Therapeutic Exercises (CPT 87926):     1. upright bike seat 8, 8': 30\" 4.0 mph    2. dead bug cross, 2x10 modified w/UE, new, NT    3. bird dog/qped UE-LE, L, R 2x 10-12 w/ ball, new/NT,    4. bridge w/adductor squeeze, 2x60\" sec. hold, *NT    5. wall PPU, wall thred the needle, 5x5\" ea, *NT    6. L hand on wall pelvic shift ( Rbend), 5 x10-15\", *NT    7. seated ball march, 30, new, NT    8. S/L hip clam shell,, 2x10 blueTB, NT    9. Squat /hip hinge, 10 11 blue band, nt    10. biceps curls, 10, 8#, ^NT    11. bent over row, 10,8#, ^NT    12. 1/2 kneel triceps, 10, 5#, ^NT    13. upright row, 14 5#, NT    " "14. farmer carries, 6 laps 11#    15. Gonsalo deadlift, 12 2# wand; 5 8#, nt    16. hip flexor stretch near wall, 2x30\", new dememre's I    17. superwoman, 2x1' 1x w/ arm support and  ext. on ball: 65 cm, newaugusto's I    18. R middle traps stretch, 30\", demo's I    19. toy soldier, 2x5-10 BlueTB      Therapeutic Exercise Summary: 'New' hep [ref. [id number]] emailed to   [email or web address]        Therapeutic Treatments and Modalities:     1. Self Care ADL Training (CPT 48142), discussed goals, progress made, recommendations    Time-based treatments/modalities:             Pain rating (1-10) after treatment:  0    ASSESSMENT:   Please see progress note.   Response to treatment:         .  PLAN/RECOMMENDATIONS:   Plan for treatment: discharge patient due to accomplished goals.         "

## 2025-05-29 ENCOUNTER — APPOINTMENT (OUTPATIENT)
Dept: PHYSICAL THERAPY | Facility: REHABILITATION | Age: 40
End: 2025-05-29
Payer: MEDICAID

## 2025-05-29 NOTE — OP THERAPY PROGRESS SUMMARY
Outpatient Physical Therapy  PROGRESS SUMMARY NOTE      [facility] [place] [id number] [facility], [address]  [place] [address]  Phone:  [phone]    Date of Visit: 05/27/2025    Patient: Catherine Porter  YOB: 1985  MRN: [id number]     Referring Provider: Rubina Stratton P.A.-C.  [address] [place] [address]   Referring Diagnosis Wedge compression fracture of first lumbar vertebra, initial encounter for closed fracture [S32.010A]     Visit Diagnoses     ICD-10-CM   1. Lumbar pain  M54.50       Rehab Potential: good    Progress Report Period: 03/31 to 05/05/27/25    Functional Assessment Used          Objective Findings and Assessment:   Patient progression towards goals: Short Term Goals: time span:  2-4 weeks  Increase AROM of L/S bend to 35, E to 25 degrees, respectively ( in accordance w/ AAOS) met  Follow a HEP/self care techniques 5x/wk to be able to lower pain on own.- Met      Long Term Goals:  time span:  6 weeks  Pt. able to progress to Level 2 core stabilization exercise - Met + postural strengthening exercises  MCID score on Lorenzo Doug LBP Qu. by 6 points - Not Met  RTW improving tolerance to computer/desk work w/supportive seat surface - Met, she RTW F/T of May 1, standing desk accommodation May 16        Objective findings and assessment details: Pt. Has met 4/5 goals; not the back questionairre. Pt. Has a HEP she can follow, while it helps reduce her symptoms it does not abolish the pain.  Recommend discharge from PT at this time as she has met most goals.    Pt. would benefit from complementary therapies like chiropractic for spinal mobilization when she is cleared for such treatments.        Referring provider co-signature:    Physician Signature: ________________________________ Date: ______________

## 2025-06-01 DIAGNOSIS — G44.209 TENSION HEADACHE: ICD-10-CM

## 2025-06-02 NOTE — TELEPHONE ENCOUNTER
Received request via: Pharmacy    Was the patient seen in the last year in this department? Yes    Does the patient have an active prescription (recently filled or refills available) for medication(s) requested? No    Pharmacy Name: [facility] [place] [facility]    Does the patient have prison Plus and need 100-day supply? (This applies to ALL medications) Patient does not have SCP

## 2025-06-03 RX ORDER — IBUPROFEN 800 MG/1
800 TABLET, FILM COATED ORAL 3 TIMES DAILY PRN
Qty: 90 TABLET | Refills: 0 | Status: SHIPPED | OUTPATIENT
Start: 2025-06-03

## 2025-06-11 ENCOUNTER — OFFICE VISIT (OUTPATIENT)
Dept: INTERNAL MEDICINE | Facility: OTHER | Age: 40
End: 2025-06-11
Payer: MEDICAID

## 2025-06-11 VITALS
OXYGEN SATURATION: 97 % | HEART RATE: 70 BPM | WEIGHT: 248.6 LBS | BODY MASS INDEX: 39.95 KG/M2 | DIASTOLIC BLOOD PRESSURE: 92 MMHG | HEIGHT: 66 IN | SYSTOLIC BLOOD PRESSURE: 135 MMHG | TEMPERATURE: 98.1 F

## 2025-06-11 DIAGNOSIS — R03.0 ELEVATED BLOOD PRESSURE READING: Primary | ICD-10-CM

## 2025-06-11 DIAGNOSIS — F41.9 ANXIETY: ICD-10-CM

## 2025-06-11 PROCEDURE — 3075F SYST BP GE 130 - 139MM HG: CPT

## 2025-06-11 PROCEDURE — 3080F DIAST BP >= 90 MM HG: CPT

## 2025-06-11 PROCEDURE — 99213 OFFICE O/P EST LOW 20 MIN: CPT | Mod: GE

## 2025-06-11 RX ORDER — ESCITALOPRAM OXALATE 20 MG/1
20 TABLET ORAL DAILY
Qty: 30 TABLET | Refills: 2 | Status: SHIPPED | OUTPATIENT
Start: 2025-06-11

## 2025-06-11 ASSESSMENT — ENCOUNTER SYMPTOMS
BLURRED VISION: 0
FEVER: 0
EYE PAIN: 0
NAUSEA: 0
CHILLS: 0
VOMITING: 0
HEADACHES: 0
WHEEZING: 0
PHOTOPHOBIA: 0
DEPRESSION: 0
WEAKNESS: 0
INSOMNIA: 0
SORE THROAT: 0
NERVOUS/ANXIOUS: 1
DIZZINESS: 0
DIAPHORESIS: 0
COUGH: 0
DIARRHEA: 0
ABDOMINAL PAIN: 0
CONSTIPATION: 0
SHORTNESS OF BREATH: 0
HEARTBURN: 0
PALPITATIONS: 0

## 2025-06-11 NOTE — PATIENT INSTRUCTIONS
Thank you for visiting [facility]!    Medications changes include taking Amlodipine 5 mg once daily everyday and increasing Lexapro to 20 mg once daily.     Please try to limit the use of Ibuprofen as that can increase blood pressure.    Please get your lab drawn approximately 3 days before your next appointment.    Please do not hesitate to contact me on MyChart should you need anything else!

## 2025-06-11 NOTE — PROGRESS NOTES
OFFICE VISIT    Catherine Porter is a 39 y.o. female who presents today with the following:    Reason for visit: Follow up regarding elevated blood pressure    HPI:    Patient reports her home BP readings have been ranging 119-189 systolic with an average of about 130-140s since her last visit. She admits she has not been taking her Amlodipine everyday due to forgetfulness. Patient has been taking it roughly 2-3 times a week. She was not sure about taking it everyday. Denies side effects from it and notes it works when she does take it.     She laments that her stress and anxiety have been worsening with regards to family matters. Her  is currently debilitated from an unknown neurological disease. She feels she has to support the whole family which has taken a toll on her health. Patient plans to leave her job at the end of the week and move to her parents residence in [place] at the end of the month. Her anxiety fluctuates daily. She endorses SOB at times of stress to which she addresses this with deep breathing exercises.     Review of Systems   Constitutional:  Negative for chills, diaphoresis, fever and malaise/fatigue.   HENT:  Negative for congestion, ear pain, hearing loss and sore throat.    Eyes:  Negative for blurred vision, photophobia and pain.   Respiratory:  Negative for cough, shortness of breath and wheezing.    Cardiovascular:  Negative for chest pain, palpitations and leg swelling.   Gastrointestinal:  Negative for abdominal pain, constipation, diarrhea, heartburn, nausea and vomiting.   Genitourinary:  Negative for dysuria and frequency.   Skin:  Negative for rash.   Neurological:  Negative for dizziness, weakness and headaches.   Psychiatric/Behavioral:  Negative for depression. The patient is nervous/anxious. The patient does not have insomnia.        Vitals:   BP (!) 135/92 (BP Location: Left arm, Patient Position: Sitting, BP Cuff Size: Adult) Comment: forearm  Pulse 70    "Temp 36.7 °C (98.1 °F) (Temporal)   Ht 1.676 m (5' 6\")   Wt 113 kg (248 lb 9.6 oz)   SpO2 97%   BMI 40.13 kg/m²     Physical Exam  Vitals and nursing note reviewed.   Constitutional:       General: She is not in acute distress.     Appearance: She is not diaphoretic.   HENT:      Head: Normocephalic and atraumatic.      Mouth/Throat:      Mouth: Mucous membranes are moist.      Pharynx: Oropharynx is clear. No oropharyngeal exudate or posterior oropharyngeal erythema.   Cardiovascular:      Rate and Rhythm: Normal rate and regular rhythm.      Pulses: Normal pulses.      Heart sounds: Normal heart sounds. No murmur heard.     No gallop.   Pulmonary:      Effort: Pulmonary effort is normal. No respiratory distress.      Breath sounds: Normal breath sounds. No wheezing, rhonchi or rales.   Abdominal:      General: There is no distension.      Palpations: Abdomen is soft.      Tenderness: There is no abdominal tenderness.   Musculoskeletal:      Right lower leg: No edema.      Left lower leg: No edema.   Skin:     General: Skin is warm.      Findings: No rash.   Neurological:      General: No focal deficit present.      Mental Status: She is alert and oriented to person, place, and time.   Psychiatric:         Mood and Affect: Mood normal.         Assessment and Plan:     Elevated blood pressure reading  Given her history, I suspect the patient's elevated BP is secondary to stress and anxiety from underlying family matters. She does take Ibuprofen 1-3 times a week for breakthrough pain which may be contributing. Given that the patient has not been taking Amlodipine everyday, I have decided to not change the dose.  -Continue Amlodipine 5 mg once daily, advised to take it everyday  -She will continue to check her BP 2-3 times a week and keep a log  -Will address underlying anxiety under anxiety  -Advised to limit use of Ibuprofen and to trial Tylenol instead  -Will check BMP prior to her next " visit    Anxiety  Patient is suffering from severe stress and anxiety due to her family situation. Therapy in the past was not helpful but she does have strong support from family and friends who she confides in. Previously was on Lexapro 20 mg in the past but was stopped by psych due to complaints of fatigue.  -Will increase Lexapro 20 mg once daily as she has tolerated well before      Orders Placed This Encounter    Basic Metabolic Panel    escitalopram (LEXAPRO) 20 MG tablet       Return in about 2 weeks (around 6/25/2025) for w/ PCP or anyone available.      Patient case was seen/ assessed/ discussed with Dr. Mccrary      Please note that this dictation was created using voice recognition software. I have made every reasonable attempt to correct obvious errors, but I expect that there are errors of grammar and possibly content that I did not discover before finalizing the note.       Signed by:    Walter Bennett DO    PGY-1 Internal Medicine Resident

## 2025-06-11 NOTE — ASSESSMENT & PLAN NOTE
Patient is suffering from severe stress and anxiety due to her family situation. Therapy in the past was not helpful but she does have strong support from family and friends who she confides in. Previously was on Lexapro 20 mg in the past but was stopped by psych due to complaints of fatigue.  -Will increase Lexapro 20 mg once daily as she has tolerated well before

## 2025-06-11 NOTE — ASSESSMENT & PLAN NOTE
Given her history, I suspect the patient's elevated BP is secondary to stress and anxiety from underlying family matters. She does take Ibuprofen 1-3 times a week for breakthrough pain which may be contributing. Given that the patient has not been taking Amlodipine everyday, I have decided to not change the dose.  -Continue Amlodipine 5 mg once daily, advised to take it everyday  -She will continue to check her BP 2-3 times a week and keep a log  -Will address underlying anxiety under anxiety  -Advised to limit use of Ibuprofen and to trial Tylenol instead  -Will check BMP prior to her next visit

## 2025-06-25 ENCOUNTER — APPOINTMENT (OUTPATIENT)
Dept: INTERNAL MEDICINE | Facility: OTHER | Age: 40
End: 2025-06-25
Payer: MEDICAID

## 2025-06-26 ENCOUNTER — OFFICE VISIT (OUTPATIENT)
Dept: INTERNAL MEDICINE | Facility: OTHER | Age: 40
End: 2025-06-26
Payer: MEDICAID

## 2025-06-26 VITALS
OXYGEN SATURATION: 95 % | BODY MASS INDEX: 38.99 KG/M2 | HEIGHT: 66 IN | HEART RATE: 69 BPM | WEIGHT: 242.6 LBS | TEMPERATURE: 97.9 F | SYSTOLIC BLOOD PRESSURE: 131 MMHG | DIASTOLIC BLOOD PRESSURE: 89 MMHG

## 2025-06-26 DIAGNOSIS — F41.9 ANXIETY: ICD-10-CM

## 2025-06-26 DIAGNOSIS — I10 PRIMARY HYPERTENSION: Primary | ICD-10-CM

## 2025-06-26 RX ORDER — AMLODIPINE BESYLATE 5 MG/1
10 TABLET ORAL DAILY
Qty: 60 TABLET | Refills: 0 | Status: SHIPPED | OUTPATIENT
Start: 2025-06-26 | End: 2025-06-26

## 2025-06-26 RX ORDER — AMLODIPINE BESYLATE 5 MG/1
10 TABLET ORAL DAILY
Qty: 90 TABLET | Refills: 0 | Status: SHIPPED | OUTPATIENT
Start: 2025-06-26 | End: 2026-07-31

## 2025-06-26 NOTE — PROGRESS NOTES
"    Established Patient    Patient Care Team:  Rian Boyd M.D. as PCP - General (Internal Medicine)    Today's visit and plan was discussed with attending physician, Dr. Cabrera.    Chief Complaint   Patient presents with    Hypertension     Follow up       HPI:  Catherine Porter is a 39 y.o. female with relevant medical problems of prediabetes, dyslipidemia, MARY (on CPAP), morbid obesity, MDD, anxiety, vulvar cancer, hypertension who presents today to follow up for hypertension.    She forgot to get the BMP that was ordered last visit.  She did bring in her blood pressure log which showed BP range of 120-160s/80-90s overall with 1 outlier around 100/60.  BP today 131/89.  She has been taking her amlodipine 5 mg daily.    Increase in Lexapro to 20 mg has made a significant improvement.  She does have numerous life stressors including moving to [place] tomorrow, significant health concerns regarding her , and financial struggles.  Once she gets settled in [place] and has less stress, her prescription should be reevaluated by her new primary care provider.    Review of systems completed, including 10 pertinent systems, with all other systems negative unless noted above.     Past Medical History[1]    Social History[2]    Current Medications[3]    /89 (BP Location: Left arm, Patient Position: Sitting, BP Cuff Size: Large adult)   Pulse 69   Temp 36.6 °C (97.9 °F) (Temporal)   Ht 1.676 m (5' 6\")   Wt 110 kg (242 lb 9.6 oz)   SpO2 95%   BMI 39.16 kg/m²     PHYSICAL EXAM:   General: No acute distress, good interaction.   Head and Neck: NC/AT, EOMI, no scleral icterus or conjunctival pallor.  CV: Rhythmic heart sounds, no murmurs, gallops or rubs.  Pulm: Chest expansion is symmetrical, no crackles, rales, rhonchi, or wheezing.  GI: Flat, non distended, soft, non tender, normal bowel sounds.  Skin: No rash, wounds, bruising. Warm and dry.   MSK: Normal ROM, strength, gait, station. No " swelling, tenderness, deformities, atrophy.  Neuro: Patient is alert and oriented x3. Speech is clear and fluent, goal directed. Pupils equal, round and reactive to light. Good eye contact. Extra ocular movements intact. Facial and body symmetry without obvious focal deficits.  Psych: Appropriate mood and affect.      Assessment and Plan:   1. Primary hypertension  Given that her blood pressure is above target for the majority of her home blood pressure readings (see media tab for details), will increase her amlodipine from 5 mg to 10 mg.  Also recommended that she discuss with her new doctor in [place] if any different blood pressure medication would suit her better either to address prediabetes (ACE/ARB) or anxiety (beta-blocker).  For now, since she is tolerating amlodipine we will continue.  - Reviewed symptoms of hypotension and threshold of blood pressure readings to discontinue the higher dose of amlodipine and switch back to the 5 mg.  -Continue blood pressure log  - amLODIPine (NORVASC) 5 MG Tab; Take 2 Tablets by mouth every day.  Dispense: 90 Tablet; Refill: 0    2. Anxiety  Multiple life stressors including moving to [place] very soon and her 's health issues.  - Continue Lexapro 20 mg daily, reevaluate with new primary care provider in [place]    Sydney Wolf M.D., PGY-2 Internal Medicine  [facility]    This note was created using voice recognition software.  While every attempt is made to ensure accuracy of transcription, occasionally errors occur.         [1]   Past Medical History:  Diagnosis Date    Hyperlipidemia LDL goal < 130 7/30/2015   [2]   Social History  Tobacco Use    Smoking status: Never    Smokeless tobacco: Never   Vaping Use    Vaping status: Never Used   Substance Use Topics    Alcohol use: Yes     Comment: 4/month or less    Drug use: Not Currently     Types: Marijuana     Comment: cannabis gummies sometimes   [3]   Current Outpatient  Medications   Medication Sig Dispense Refill    amLODIPine (NORVASC) 5 MG Tab Take 2 Tablets by mouth every day. 90 Tablet 0    escitalopram (LEXAPRO) 20 MG tablet Take 1 Tablet by mouth every day. 30 Tablet 2    ibuprofen (MOTRIN) 800 MG Tab TAKE 1 TABLET BY MOUTH THREE TIMES DAILY AS NEEDED FOR MODERATE PAIN 90 Tablet 0    ergocalciferol (DRISDOL) 71643 UNIT capsule Take 1 Capsule by mouth every 7 days. 12 Capsule 0    metFORMIN ER (GLUCOPHAGE XR) 500 MG TABLET SR 24 HR Take 1 tablet by mouth once daily 90 Tablet 0    tizanidine (ZANAFLEX) 4 MG Tab Take 1 Tablet by mouth every 6 hours as needed (PAIN). 30 Tablet 3    fexofenadine (EQ ALLERGY RELIEF) 180 MG tablet Take 1 Tablet by mouth every day. 90 Tablet 0    ferrous sulfate 325 (65 Fe) MG tablet Take 325 mg by mouth every day.       No current facility-administered medications for this visit.

## 2025-06-26 NOTE — PATIENT INSTRUCTIONS
Thank you for visiting today!    Good luck with your move back home to [place]!  Start taking 2 tablets of amlodipine 5 mg daily (10 mg total). Continue to monitor your blood pressure. If you experience any lightheadedness, dizziness, visual changes (ie black spots), decrease your dose back to 5 mg. If your blood pressure is consistently less than 110/60, please reduce dose back to 5 mg. Please establish with a new provider as soon as possible.    Please try and eat healthy, get at least 30 minutes of cardiovascular exercise a day to help keep your health as best as it can be.  If you have any questions or concerns please feel free to contact us at [phone].  If you feel like you are experiencing a medical emergency please seek immediate medical attention at an urgent care or in the emergency department.

## 2025-07-18 ENCOUNTER — TELEPHONE (OUTPATIENT)
Dept: INTERNAL MEDICINE | Facility: OTHER | Age: 40
End: 2025-07-18
Payer: MEDICAID

## 2025-07-18 NOTE — TELEPHONE ENCOUNTER
Sydney Jasso, Med Ass't routed conversation to You22 hours ago (9:08 AM)     Corine Senior, Med Ass't routed conversation to Rian Boyd M.D.2 days ago     Celeste Adams routed conversation to [facility] [place]2 days ago     Catherine LEON [facility] Customer Service2 days ago     GL  Topic: [facility] Referral Status.     Hello,     I notice the refferal said stretching n stretching but it was supposed  to be for complimentary.  The side is tightening so I need one to get into a place that works on the muscles and joints.     Thank you

## (undated) DEVICE — MASK ANESTHESIA ADULT  - (100/CA)

## (undated) DEVICE — GLOVE SZ 7 BIOGEL PI MICRO - PF LF (50PR/BX 4BX/CA)

## (undated) DEVICE — SUCTION INSTRUMENT YANKAUER BULBOUS TIP W/O VENT (50EA/CA)

## (undated) DEVICE — NEPTUNE 4 PORT MANIFOLD - (20/PK)

## (undated) DEVICE — KIT  I.V. START (100EA/CA)

## (undated) DEVICE — HEAD HOLDER JUNIOR/ADULT

## (undated) DEVICE — GOWN SURGEONS X-LARGE - DISP. (30/CA)

## (undated) DEVICE — GOWN SURGEONS LARGE - (32/CA)

## (undated) DEVICE — SUTURE GENERAL

## (undated) DEVICE — CANISTER SUCTION 3000ML MECHANICAL FILTER AUTO SHUTOFF MEDI-VAC NONSTERILE LF DISP  (40EA/CA)

## (undated) DEVICE — HOLDER MAGNETIC NEEDLE DEVON - (12EA/BX 8BX/CA)

## (undated) DEVICE — GLOVE BIOGEL PI INDICATOR SZ 7.5 SURGICAL PF LF -(50/BX 4BX/CA)

## (undated) DEVICE — CLOSURE SKIN STRIP 1/2 X 4 IN - (STERI STRIP) (50/BX 4BX/CA)

## (undated) DEVICE — TRAY SKIN SCRUB PVP WET (20EA/CA) PART #DYND70356 DISCONTINUED

## (undated) DEVICE — SENSOR SPO2 NEO LNCS ADHESIVE (20/BX) SEE USER NOTES

## (undated) DEVICE — CHLORAPREP 26 ML APPLICATOR - ORANGE TINT(25/CA)

## (undated) DEVICE — GLOVE SZ 7.5 BIOGEL PI MICRO - PF LF (50PR/BX)

## (undated) DEVICE — PACK MINOR BASIN - (2EA/CA)

## (undated) DEVICE — PROTECTOR ULNA NERVE - (36PR/CA)

## (undated) DEVICE — KIT ANESTHESIA W/CIRCUIT & 3/LT BAG W/FILTER (20EA/CA)

## (undated) DEVICE — SET LEADWIRE 5 LEAD BEDSIDE DISPOSABLE ECG (1SET OF 5/EA)

## (undated) DEVICE — ELECTRODE DUAL RETURN W/ CORD - (50/PK)

## (undated) DEVICE — GLOVE BIOGEL SZ 6.5 SURGICAL PF LTX (50PR/BX 4BX/CA)

## (undated) DEVICE — ELECTRODE 850 FOAM ADHESIVE - HYDROGEL RADIOTRNSPRNT (50/PK)

## (undated) DEVICE — TUBE CONNECTING SUCTION - CLEAR PLASTIC STERILE 72 IN (50EA/CA)

## (undated) DEVICE — GLOVE BIOGEL PI INDICATOR SZ 7.0 SURGICAL PF LF - (50/BX 4BX/CA)

## (undated) DEVICE — DRAPE SURGICAL U 77X120 - (10/CA)

## (undated) DEVICE — CANISTER SUCTION RIGID RED 1500CC (40EA/CA)

## (undated) DEVICE — SET EXTENSION WITH 2 PORTS (48EA/CA) ***PART #2C8610 IS A SUBSTITUTE*****

## (undated) DEVICE — GLOVE BIOGEL SZ 7.5 SURGICAL PF LTX - (50PR/BX 4BX/CA)

## (undated) DEVICE — MASK, LARYNGEAL AIRWAY #4

## (undated) DEVICE — TUBING CLEARLINK DUO-VENT - C-FLO (48EA/CA)

## (undated) DEVICE — SODIUM CHL IRRIGATION 0.9% 1000ML (12EA/CA)

## (undated) DEVICE — LACTATED RINGERS INJ 1000 ML - (14EA/CA 60CA/PF)

## (undated) DEVICE — CATHETER IV 20 GA X 1-1/4 ---SURG.& SDS ONLY--- (50EA/BX)